# Patient Record
Sex: MALE | Race: WHITE | NOT HISPANIC OR LATINO | Employment: FULL TIME | URBAN - NONMETROPOLITAN AREA
[De-identification: names, ages, dates, MRNs, and addresses within clinical notes are randomized per-mention and may not be internally consistent; named-entity substitution may affect disease eponyms.]

---

## 2019-11-16 ENCOUNTER — HOSPITAL ENCOUNTER (EMERGENCY)
Facility: HOSPITAL | Age: 59
Discharge: HOME | End: 2019-11-16
Attending: EMERGENCY MEDICINE | Admitting: EMERGENCY MEDICINE

## 2019-11-16 ENCOUNTER — APPOINTMENT (OUTPATIENT)
Dept: GENERAL RADIOLOGY | Facility: HOSPITAL | Age: 59
End: 2019-11-16

## 2019-11-16 VITALS
BODY MASS INDEX: 26.63 KG/M2 | HEART RATE: 67 BPM | HEIGHT: 70 IN | TEMPERATURE: 98.2 F | WEIGHT: 186 LBS | DIASTOLIC BLOOD PRESSURE: 82 MMHG | RESPIRATION RATE: 18 BRPM | OXYGEN SATURATION: 96 % | SYSTOLIC BLOOD PRESSURE: 144 MMHG
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
## 2019-11-16 PROCEDURE — 73130 X-RAY EXAM OF HAND: CPT

## 2019-11-16 PROCEDURE — 99283 EMERGENCY DEPT VISIT LOW MDM: CPT

## 2019-11-16 RX ORDER — CEPHALEXIN 500 MG/1
500 CAPSULE ORAL 3 TIMES DAILY
Qty: 15 CAPSULE | Refills: 0 | Status: SHIPPED | OUTPATIENT
Start: 2019-11-16 | End: 2019-11-21

## 2019-11-16 RX ORDER — LIDOCAINE HYDROCHLORIDE 10 MG/ML
10 INJECTION, SOLUTION EPIDURAL; INFILTRATION; INTRACAUDAL; PERINEURAL ONCE
Status: DISCONTINUED | OUTPATIENT
Start: 2019-11-16 | End: 2019-11-16 | Stop reason: HOSPADM

## 2019-11-16 RX ORDER — BACITRACIN, NEOMYCIN, POLYMYXIN B 400; 3.5; 5 [USP'U]/G; MG/G; [USP'U]/G
OINTMENT TOPICAL ONCE
Status: DISCONTINUED | OUTPATIENT
Start: 2019-11-16 | End: 2019-11-16 | Stop reason: HOSPADM

## 2019-11-16 RX ADMIN — LIDOCAINE HYDROCHLORIDE 10 ML: 10 INJECTION, SOLUTION EPIDURAL; INFILTRATION; INTRACAUDAL; PERINEURAL at 18:10

## 2019-11-16 NOTE — DISCHARGE INSTRUCTIONS
Call one of the offices below to establish a primary care provider.  If you are unable to get an appointment and feel it is an emergency and need to be seen immediately please return to the Emergency Department.    Call one of the office below to set up a primary care provider.    Dr. Randell Peacock                                                                                                       [address] [facility] [address]  [phone]    Dr. Mcmillan, Dr. FRANCIS Green, Dr. JAIRO Green ([facility])  [address] [address]  [phone]    Dr. Freitas, Dr. Ly, Dr. Franco ([facility])  [address] [facility] [address]  [phone]    Dr. Ortiz  [address] [facility] [address]  [phone]    Dr. Hodges, Dr. Stevens, Dr. Freeman, Dr. Lindo ([facility])  [address] [place] [address] [address]  [facility] [address]  [phone]    Dr. Lila Marshall  [address] [facility] [place] [address]  [phone]    Dr. Emma Borges  [address]   [address]  [facility] [address]  [phone]    Dr. Peacock  [address] [facility] [address]  [phone]    Dr. Graves, Dr. Snow  [address] [facility] [place] [address]  [phone]    Dr. Barron  [address]                                                              [address]  [facility] [address]  [phone]    Dr. Blakely  [address] [facility] [id number] [phone]    Dr. Vita Pedroza  [address]  [address]  [facility] [address]  [phone]

## 2019-11-16 NOTE — ED PROVIDER NOTES
Subjective     History provided by:  Patient   used: No    Hand Injury   Location:  Finger  Finger location:  L index finger  Injury: yes    Time since incident:  1 hour  Mechanism of injury comment:  Patient reports that he was fishing and got one treble hook embeeded into skin of left index finger  Pain details:     Quality:  Aching    Radiates to:  Does not radiate    Severity:  Mild    Onset quality:  Sudden    Duration:  1 hour    Timing:  Constant    Progression:  Waxing and waning  Handedness:  Right-handed  Dislocation: no    Foreign body present:  Metal (fish hook )  Tetanus status:  Up to date  Prior injury to area:  No  Relieved by:  Nothing  Worsened by:  Nothing  Ineffective treatments:  None tried  Associated symptoms: no back pain, no decreased range of motion, no fever, no neck pain, no numbness, no swelling and no tingling    Risk factors: no frequent fractures and no recent illness        Review of Systems   Constitutional: Negative.  Negative for fever.   HENT: Negative.    Eyes: Negative.    Respiratory: Negative.    Cardiovascular: Negative.    Gastrointestinal: Negative.    Endocrine: Negative.    Genitourinary: Negative.    Musculoskeletal: Negative.  Negative for back pain and neck pain.   Skin: Negative.    Allergic/Immunologic: Negative.    Neurological: Negative.    Hematological: Negative.    Psychiatric/Behavioral: Negative.        No past medical history on file.    No Known Allergies    Past Surgical History:   Procedure Laterality Date   • ANKLE ARTHROSCOPY         No family history on file.    Social History     Socioeconomic History   • Marital status: Single     Spouse name: Not on file   • Number of children: Not on file   • Years of education: Not on file   • Highest education level: Not on file   Tobacco Use   • Smoking status: Never Smoker   Substance and Sexual Activity   • Alcohol use: Yes   • Drug use: No   • Sexual activity: Defer           Objective    Physical Exam   Constitutional: He is oriented to person, place, and time. He appears well-developed and well-nourished.   HENT:   Head: Normocephalic.   Right Ear: External ear normal.   Left Ear: External ear normal.   Mouth/Throat: Oropharynx is clear and moist.   Eyes: Conjunctivae and EOM are normal. Pupils are equal, round, and reactive to light.   Neck: Normal range of motion. Neck supple.   Cardiovascular: Normal rate, regular rhythm, normal heart sounds and intact distal pulses.   Pulmonary/Chest: Effort normal and breath sounds normal.   Abdominal: Soft. Bowel sounds are normal.   Musculoskeletal: Normal range of motion.   Neurological: He is alert and oriented to person, place, and time.   Skin: Skin is warm and dry. Capillary refill takes less than 2 seconds.   Fish hook embedded superficially into surface of middle phalanx of left finger on dorsum of hand     Psychiatric: He has a normal mood and affect. His behavior is normal. Thought content normal.   Nursing note and vitals reviewed.      Foreign Body Removal - Embedded  Date/Time: 11/16/2019 6:15 PM  Performed by: Fawad Avila APRN  Authorized by: Peter Peguero MD     Consent:     Consent obtained:  Verbal    Consent given by:  Patient    Risks discussed:  Bleeding, infection, pain, incomplete removal and poor cosmetic result  Location:     Location:  Finger    Finger location:  L index finger    Depth:  Intradermal    Tendon involvement:  None  Pre-procedure details:     Imaging:  X-ray    Neurovascular status: intact    Anesthesia (see MAR for exact dosages):     Anesthesia method:  Nerve block    Block needle gauge:  25 G    Block anesthetic:  Lidocaine 1% w/o epi    Block technique:  Digital block    Block injection procedure:  Anatomic landmarks identified, introduced needle, incremental injection, anatomic landmarks palpated and negative aspiration for blood    Block outcome:  Anesthesia achieved  Procedure type:     Procedure  complexity:  Simple  Procedure details:     Localization method:  Visualized    Dissection of underlying tissues: no      Bloodless field: yes      Removal mechanism: pushed jose m of hook through and cut and then removed from finger.    Foreign bodies recovered:  1    Description:  Fish hook     Intact foreign body removal: yes    Post-procedure details:     Neurovascular status: intact      Confirmation:  No additional foreign bodies on visualization    Skin closure:  None    Dressing:  Antibiotic ointment and sterile dressing    Patient tolerance of procedure:  Tolerated well, no immediate complications               ED Course                  MDM    Final diagnoses:   Foreign body entering through skin, initial encounter              Fawad Avila, APRN  11/16/19 1828

## 2020-06-05 ENCOUNTER — APPOINTMENT (OUTPATIENT)
Dept: GENERAL RADIOLOGY | Facility: HOSPITAL | Age: 60
End: 2020-06-05

## 2020-06-05 ENCOUNTER — HOSPITAL ENCOUNTER (INPATIENT)
Facility: HOSPITAL | Age: 60
LOS: 2 days | Discharge: HOME | End: 2020-06-07
Attending: EMERGENCY MEDICINE | Admitting: INTERNAL MEDICINE

## 2020-06-05 ENCOUNTER — APPOINTMENT (OUTPATIENT)
Dept: CT IMAGING | Facility: HOSPITAL | Age: 60
End: 2020-06-05

## 2020-06-05 DIAGNOSIS — L03.115 CELLULITIS OF FOOT, RIGHT: Primary | ICD-10-CM

## 2020-06-05 DIAGNOSIS — E11.621 CONTROLLED TYPE 2 DIABETES MELLITUS WITH FOOT ULCER, WITHOUT LONG-TERM CURRENT USE OF INSULIN (HCC): ICD-10-CM

## 2020-06-05 DIAGNOSIS — E11.621 TYPE 2 DIABETES MELLITUS WITH FOOT ULCER, WITHOUT LONG-TERM CURRENT USE OF INSULIN (HCC): ICD-10-CM

## 2020-06-05 DIAGNOSIS — R73.9 HYPERGLYCEMIA: ICD-10-CM

## 2020-06-05 DIAGNOSIS — L97.509 CONTROLLED TYPE 2 DIABETES MELLITUS WITH FOOT ULCER, WITHOUT LONG-TERM CURRENT USE OF INSULIN (HCC): ICD-10-CM

## 2020-06-05 DIAGNOSIS — L97.509 TYPE 2 DIABETES MELLITUS WITH FOOT ULCER, WITHOUT LONG-TERM CURRENT USE OF INSULIN (HCC): ICD-10-CM

## 2020-06-05 PROBLEM — L03.90 SEPSIS DUE TO CELLULITIS (HCC): Status: ACTIVE | Noted: 2020-06-05

## 2020-06-05 PROBLEM — A41.9 SEPSIS DUE TO CELLULITIS (HCC): Status: ACTIVE | Noted: 2020-06-05

## 2020-06-05 LAB
ALBUMIN SERPL-MCNC: 3.9 G/DL (ref 3.5–5.2)
ALBUMIN/GLOB SERPL: 0.8 G/DL
ALP SERPL-CCNC: 114 U/L (ref 39–117)
ALT SERPL W P-5'-P-CCNC: 11 U/L (ref 1–41)
ANION GAP SERPL CALCULATED.3IONS-SCNC: 11.4 MMOL/L (ref 5–15)
AST SERPL-CCNC: 14 U/L (ref 1–40)
BASOPHILS # BLD AUTO: 0.04 10*3/MM3 (ref 0–0.2)
BASOPHILS NFR BLD AUTO: 0.3 % (ref 0–1.5)
BILIRUB SERPL-MCNC: 0.3 MG/DL (ref 0.2–1.2)
BILIRUB UR QL STRIP: NEGATIVE
BUN BLD-MCNC: 19 MG/DL (ref 8–23)
BUN/CREAT SERPL: 20.2 (ref 7–25)
CALCIUM SPEC-SCNC: 9.7 MG/DL (ref 8.6–10.5)
CHLORIDE SERPL-SCNC: 98 MMOL/L (ref 98–107)
CLARITY UR: CLEAR
CO2 SERPL-SCNC: 26.6 MMOL/L (ref 22–29)
COLOR UR: YELLOW
CREAT BLD-MCNC: 0.94 MG/DL (ref 0.76–1.27)
CRP SERPL-MCNC: 8.32 MG/DL (ref 0–0.5)
D-LACTATE SERPL-SCNC: 1.7 MMOL/L (ref 0.5–2)
DEPRECATED RDW RBC AUTO: 39.7 FL (ref 37–54)
EOSINOPHIL # BLD AUTO: 0.1 10*3/MM3 (ref 0–0.4)
EOSINOPHIL NFR BLD AUTO: 0.7 % (ref 0.3–6.2)
ERYTHROCYTE [DISTWIDTH] IN BLOOD BY AUTOMATED COUNT: 11.9 % (ref 12.3–15.4)
ERYTHROCYTE [SEDIMENTATION RATE] IN BLOOD: 47 MM/HR (ref 0–20)
GFR SERPL CREATININE-BSD FRML MDRD: 82 ML/MIN/1.73
GLOBULIN UR ELPH-MCNC: 4.7 GM/DL
GLUCOSE BLD-MCNC: 344 MG/DL (ref 65–99)
GLUCOSE BLDC GLUCOMTR-MCNC: 239 MG/DL (ref 70–130)
GLUCOSE UR STRIP-MCNC: ABNORMAL MG/DL
HBA1C MFR BLD: 9.8 % (ref 4.8–5.6)
HCT VFR BLD AUTO: 42.1 % (ref 37.5–51)
HGB BLD-MCNC: 13.9 G/DL (ref 13–17.7)
HGB UR QL STRIP.AUTO: NEGATIVE
HOLD SPECIMEN: NORMAL
HOLD SPECIMEN: NORMAL
IMM GRANULOCYTES # BLD AUTO: 0.04 10*3/MM3 (ref 0–0.05)
IMM GRANULOCYTES NFR BLD AUTO: 0.3 % (ref 0–0.5)
KETONES UR QL STRIP: ABNORMAL
LEUKOCYTE ESTERASE UR QL STRIP.AUTO: NEGATIVE
LYMPHOCYTES # BLD AUTO: 0.97 10*3/MM3 (ref 0.7–3.1)
LYMPHOCYTES NFR BLD AUTO: 7.1 % (ref 19.6–45.3)
MCH RBC QN AUTO: 30 PG (ref 26.6–33)
MCHC RBC AUTO-ENTMCNC: 33 G/DL (ref 31.5–35.7)
MCV RBC AUTO: 90.7 FL (ref 79–97)
MONOCYTES # BLD AUTO: 1.04 10*3/MM3 (ref 0.1–0.9)
MONOCYTES NFR BLD AUTO: 7.6 % (ref 5–12)
NEUTROPHILS # BLD AUTO: 11.5 10*3/MM3 (ref 1.7–7)
NEUTROPHILS NFR BLD AUTO: 84 % (ref 42.7–76)
NITRITE UR QL STRIP: NEGATIVE
NRBC BLD AUTO-RTO: 0 /100 WBC (ref 0–0.2)
PH UR STRIP.AUTO: 5.5 [PH] (ref 5–8)
PLATELET # BLD AUTO: 461 10*3/MM3 (ref 140–450)
PMV BLD AUTO: 9.1 FL (ref 6–12)
POTASSIUM BLD-SCNC: 4.4 MMOL/L (ref 3.5–5.2)
PROT SERPL-MCNC: 8.6 G/DL (ref 6–8.5)
PROT UR QL STRIP: NEGATIVE
RBC # BLD AUTO: 4.64 10*6/MM3 (ref 4.14–5.8)
SODIUM BLD-SCNC: 136 MMOL/L (ref 136–145)
SP GR UR STRIP: >1.03 (ref 1–1.03)
UROBILINOGEN UR QL STRIP: ABNORMAL
WBC NRBC COR # BLD: 13.69 10*3/MM3 (ref 3.4–10.8)
WHOLE BLOOD HOLD SPECIMEN: NORMAL
WHOLE BLOOD HOLD SPECIMEN: NORMAL

## 2020-06-05 PROCEDURE — 85652 RBC SED RATE AUTOMATED: CPT | Performed by: PHYSICIAN ASSISTANT

## 2020-06-05 PROCEDURE — 73630 X-RAY EXAM OF FOOT: CPT | Performed by: RADIOLOGY

## 2020-06-05 PROCEDURE — 25010000002 VANCOMYCIN 5 G RECONSTITUTED SOLUTION: Performed by: INTERNAL MEDICINE

## 2020-06-05 PROCEDURE — 81003 URINALYSIS AUTO W/O SCOPE: CPT | Performed by: PHYSICIAN ASSISTANT

## 2020-06-05 PROCEDURE — 85025 COMPLETE CBC W/AUTO DIFF WBC: CPT | Performed by: PHYSICIAN ASSISTANT

## 2020-06-05 PROCEDURE — 82962 GLUCOSE BLOOD TEST: CPT

## 2020-06-05 PROCEDURE — 25010000002 HEPARIN (PORCINE) PER 1000 UNITS: Performed by: INTERNAL MEDICINE

## 2020-06-05 PROCEDURE — 87070 CULTURE OTHR SPECIMN AEROBIC: CPT | Performed by: PHYSICIAN ASSISTANT

## 2020-06-05 PROCEDURE — 73630 X-RAY EXAM OF FOOT: CPT

## 2020-06-05 PROCEDURE — 83605 ASSAY OF LACTIC ACID: CPT | Performed by: PHYSICIAN ASSISTANT

## 2020-06-05 PROCEDURE — 83036 HEMOGLOBIN GLYCOSYLATED A1C: CPT | Performed by: PHYSICIAN ASSISTANT

## 2020-06-05 PROCEDURE — 87147 CULTURE TYPE IMMUNOLOGIC: CPT | Performed by: PHYSICIAN ASSISTANT

## 2020-06-05 PROCEDURE — 99284 EMERGENCY DEPT VISIT MOD MDM: CPT

## 2020-06-05 PROCEDURE — 73700 CT LOWER EXTREMITY W/O DYE: CPT | Performed by: RADIOLOGY

## 2020-06-05 PROCEDURE — 25010000002 PIPERACILLIN SOD-TAZOBACTAM PER 1 G: Performed by: PHYSICIAN ASSISTANT

## 2020-06-05 PROCEDURE — 99223 1ST HOSP IP/OBS HIGH 75: CPT | Performed by: INTERNAL MEDICINE

## 2020-06-05 PROCEDURE — 80053 COMPREHEN METABOLIC PANEL: CPT | Performed by: PHYSICIAN ASSISTANT

## 2020-06-05 PROCEDURE — 73700 CT LOWER EXTREMITY W/O DYE: CPT

## 2020-06-05 PROCEDURE — 86140 C-REACTIVE PROTEIN: CPT | Performed by: PHYSICIAN ASSISTANT

## 2020-06-05 PROCEDURE — 87205 SMEAR GRAM STAIN: CPT | Performed by: PHYSICIAN ASSISTANT

## 2020-06-05 PROCEDURE — 36415 COLL VENOUS BLD VENIPUNCTURE: CPT

## 2020-06-05 PROCEDURE — 87040 BLOOD CULTURE FOR BACTERIA: CPT | Performed by: PHYSICIAN ASSISTANT

## 2020-06-05 RX ORDER — SODIUM CHLORIDE 0.9 % (FLUSH) 0.9 %
10 SYRINGE (ML) INJECTION EVERY 12 HOURS SCHEDULED
Status: DISCONTINUED | OUTPATIENT
Start: 2020-06-05 | End: 2020-06-07 | Stop reason: HOSPADM

## 2020-06-05 RX ORDER — ACETAMINOPHEN 325 MG/1
650 TABLET ORAL EVERY 6 HOURS PRN
Status: DISCONTINUED | OUTPATIENT
Start: 2020-06-05 | End: 2020-06-07 | Stop reason: HOSPADM

## 2020-06-05 RX ORDER — HEPARIN SODIUM 5000 [USP'U]/ML
5000 INJECTION, SOLUTION INTRAVENOUS; SUBCUTANEOUS EVERY 12 HOURS SCHEDULED
Status: DISCONTINUED | OUTPATIENT
Start: 2020-06-05 | End: 2020-06-07 | Stop reason: HOSPADM

## 2020-06-05 RX ORDER — SODIUM CHLORIDE 0.9 % (FLUSH) 0.9 %
10 SYRINGE (ML) INJECTION AS NEEDED
Status: DISCONTINUED | OUTPATIENT
Start: 2020-06-05 | End: 2020-06-07 | Stop reason: HOSPADM

## 2020-06-05 RX ORDER — DEXTROSE MONOHYDRATE 25 G/50ML
25 INJECTION, SOLUTION INTRAVENOUS
Status: DISCONTINUED | OUTPATIENT
Start: 2020-06-05 | End: 2020-06-07 | Stop reason: HOSPADM

## 2020-06-05 RX ORDER — HYDROCODONE BITARTRATE AND ACETAMINOPHEN 5; 325 MG/1; MG/1
1 TABLET ORAL EVERY 6 HOURS PRN
Status: DISCONTINUED | OUTPATIENT
Start: 2020-06-05 | End: 2020-06-07 | Stop reason: HOSPADM

## 2020-06-05 RX ORDER — NICOTINE POLACRILEX 4 MG
15 LOZENGE BUCCAL
Status: DISCONTINUED | OUTPATIENT
Start: 2020-06-05 | End: 2020-06-07 | Stop reason: HOSPADM

## 2020-06-05 RX ADMIN — PIPERACILLIN SODIUM AND TAZOBACTAM SODIUM 3.38 G: 3; .375 INJECTION, POWDER, LYOPHILIZED, FOR SOLUTION INTRAVENOUS at 17:51

## 2020-06-05 RX ADMIN — VANCOMYCIN HYDROCHLORIDE 1750 MG: 5 INJECTION, POWDER, LYOPHILIZED, FOR SOLUTION INTRAVENOUS at 20:05

## 2020-06-05 RX ADMIN — HEPARIN SODIUM 5000 UNITS: 5000 INJECTION INTRAVENOUS; SUBCUTANEOUS at 20:58

## 2020-06-05 RX ADMIN — SODIUM CHLORIDE, PRESERVATIVE FREE 10 ML: 5 INJECTION INTRAVENOUS at 20:55

## 2020-06-05 NOTE — H&P
"Hospitalist History and Physical    Patient Identification:  Name: Davian Newell  Age/Sex: 60 y.o. male  :  1960  MRN: [id number]         Primary Care Physician: Provider, No Known    Chief Complaint   Patient presents with   • Foot Pain       History of Present Illness  Patient is a 60 y.o. male presents with the following: Right foot pain and redness, wound    Patient is a 60-year-old male with no significant past medical history who presents to the emergency department with a 2-week history of progressive right foot pain with erythema.    Patient states that he has had several calluses in the past as he has had surgery with steel plates in the right foot.  Patient states that he walks \"on my tiptoes.\"  He states that due to the way he walks he suffers from recurrent calluses.  The patient reports that he removed a callus from the neural aspect of the right foot and states that he \"pulled off some good skin by accident.\"  The patient reports a 3 to 4-day history of progressive redness.  He reports that the wound has been open for the past 2 weeks and he has been filling it with over-the-counter antibiotic ointment with a Band-Aid on top.  He denies any significant drainage.  He denies any injury.  He denies any fevers, chills, nausea and/or vomiting.    Questioning, patient denied any recent chest pains, shortness of air, chest pains, diarrhea and/or urinary symptoms.    When questioned regarding his elevated blood glucose level, patient denies any known history of diabetes and states that he has drank 2 energy drinks earlier today.    Patient was seen by Dr. Gerard today in her office who recommended he present to the emergency department as concerned that he may have osteomyelitis.  The patient states that his sister gave him 1 ciprofloxacin tablet but apart from this and the antibiotic ointment he has had no other therapy.    In the emergency department, patient's maximum temperature was 99.5, heart rate " 95, blood pressure 144/84.  C-RP 8.32, CMP was remarkable for glucose of 344.  Blood cell count 13.69 with 84% neutrophils.  CT scan of the right lower extremity without contrast did not reveal any osteomyelitis or abscess.    Patient is seen and examined in ER 2, room 8 and is to be admitted to the medical surgical floor for further evaluation and management.    Past History:  No past medical history on file.  Past Surgical History:   Procedure Laterality Date   • ANKLE ARTHROSCOPY       No family history on file.  Social History     Tobacco Use   • Smoking status: Never Smoker   Substance Use Topics   • Alcohol use: Yes   • Drug use: No       (Not in a hospital admission)  Allergies: Patient has no known allergies.    Review of Systems:  Review of Systems   Constitutional: Negative for chills, diaphoresis and fever.   HENT: Negative for hearing loss, tinnitus and trouble swallowing.    Eyes: Negative for discharge and visual disturbance.   Respiratory: Negative for cough, shortness of breath and wheezing.    Cardiovascular: Negative for chest pain, palpitations and leg swelling.   Gastrointestinal: Negative for abdominal pain, constipation, diarrhea, nausea and vomiting.   Endocrine: Negative for polydipsia, polyphagia and polyuria.   Genitourinary: Negative for dysuria, frequency and hematuria.   Musculoskeletal: Positive for gait problem. Negative for myalgias and neck pain.   Skin: Positive for color change and rash.   Neurological: Negative for dizziness, tremors, seizures, syncope, weakness and light-headedness.   Hematological: Does not bruise/bleed easily.   Psychiatric/Behavioral: Negative for confusion, hallucinations and suicidal ideas.      Vital Signs  Temp:  [98.1 °F (36.7 °C)] 98.1 °F (36.7 °C)  Heart Rate:  [73-95] 73  Resp:  [16] 16  BP: (144-148)/(81-82) 144/82  Body mass index is 25.83 kg/m².    Physical Exam:  Physical Exam   Constitutional: He is oriented to person, place, and time. He appears  well-developed and well-nourished. No distress.   HENT:   Head: Normocephalic and atraumatic.   Mouth/Throat: Oropharynx is clear and moist.   Eyes: Pupils are equal, round, and reactive to light. Conjunctivae and EOM are normal.   Neck: Neck supple. No tracheal deviation present. No thyromegaly present.   Cardiovascular: Normal rate and regular rhythm. Exam reveals no gallop and no friction rub.   No murmur heard.  Pulmonary/Chest: Breath sounds normal. No respiratory distress. He has no wheezes. He has no rales.   Abdominal: Soft. Bowel sounds are normal. He exhibits no distension. There is no tenderness. There is no guarding.   Musculoskeletal: Normal range of motion. He exhibits no tenderness.   Neurological: He is alert and oriented to person, place, and time. No cranial nerve deficit.   Skin: Skin is warm and dry. No rash noted. There is erythema.   Erythema noted on right foot from 2nd toe laterally around almost the entire foot. He has an open, rounded wound on the lateral aspect of the right foot. The dressing is mildly bloody. Wound bed is with slough.   Psychiatric: He has a normal mood and affect.      Results Review:    Results from last 7 days   Lab Units 06/05/20  1627   WBC 10*3/mm3 13.69*   HEMOGLOBIN g/dL 13.9   HEMATOCRIT % 42.1   PLATELETS 10*3/mm3 461*     Results from last 7 days   Lab Units 06/05/20  1627   SODIUM mmol/L 136   POTASSIUM mmol/L 4.4   CHLORIDE mmol/L 98   CO2 mmol/L 26.6   BUN mg/dL 19   CREATININE mg/dL 0.94   CALCIUM mg/dL 9.7   GLUCOSE mg/dL 344*     Results from last 7 days   Lab Units 06/05/20  1627   BILIRUBIN mg/dL 0.3   ALK PHOS U/L 114   AST (SGOT) U/L 14   ALT (SGPT) U/L 11     Results from last 7 days   Lab Units 06/05/20  1627   CRP mg/dL 8.32*     Imaging:    Xray images reviewed; no obvious osteomyelitis.    Imaging Results (Most Recent)     Procedure Component Value Units Date/Time    CT Lower Extremity Right Without Contrast [[id number]] Collected:  06/05/20 4107      Updated:  06/05/20 1721    Narrative:       EXAMINATION: CT LOWER EXTREMITY RIGHT WO CONTRAST-      CLINICAL INDICATION: swelling, tunneling wound, pain        COMPARISON: None available           DOSE:     Radiation dose reduction techniques were utilized per ALARA protocol.  Automated exposure control was initiated through either or [employer] or  DoseRight software packages by  protocol.              TECHNIQUE: Axial images were acquired through [employer]on[employer] ankle and foot  without IV contrast. Reformatted images were created.     FINDINGS:  Today's study shows postoperative hardware extending through the distal  tibia and talus.     I do not see drainable fluid collection. There is some subcutaneous  edema noted.     No evidence of osteomyelitis on today's study.       Impression:       Postoperative change and arthritic change but no evidence of  osteomyelitis or drainable fluid collection on the presented exam     This report was finalized on 6/5/2020 5:19 PM by Dr. Hussein Auguste MD.       XR Foot 3+ View Right [[id number]] Collected:  06/05/20 1649     Updated:  06/05/20 1654    Narrative:       EXAMINATION: XR FOOT 3+ VW RIGHT-      CLINICAL INDICATION: foot swelling pain        COMPARISON: None available     FINDINGS: 3 views of the right foot show appearance equivocal for  fracture involving the distal aspect of the fifth metatarsal. I do  recommend clinical correlation for point tenderness.     No focal erosive changes are identified to suggest osteomyelitis.      This report was finalized on 6/5/2020 4:51 PM by Dr. Hussein Auguste MD.             Assessment/Plan     -Sepsis, present upon admission with leukocytosis, heart rate greater than 90 and elevated C-RP secondary to a right foot cellulitis with open wound and hardware for the distal tibia and talus: He will be admitted to the medical surgical floor with telemetry.  He has been started on pharmacy to dose vancomycin and Zosyn.  Blood cultures  and a wound culture were obtained in the emergency department.  Podiatry has been consulted and the patient will be treated by mouth after midnight in case debridement is indicated.  Patient will have as needed Norco for pain.  I will repeat his CBC and C-RP in a.m.  Need to follow culture results and tailor antibiotics accordingly.    -Hyperglycemia without known diabetes mellitus: Patient's blood glucose levels significantly elevated at 344.  Hemoglobin A1c has been ordered.  Patient will be started on Accu-Cheks and the low-dose as needed sliding scale insulin.    -Active thrombocytosis secondary to #1: Repeat CBC in a.m.    -Blood pressure without known history of hypertension: We will monitor.  Pain likely exacerbating.  Hold on antihypertensive agent at this time.    DVT prophylaxis: Subcutaneous heparin    Estimated Length of Stay: > 2 MNs    I discussed the patients findings and my recommendations with patient      Carina Grossman DO  06/05/20  18:02

## 2020-06-05 NOTE — ED PROVIDER NOTES
Subjective   61 y/o male patient presents to the ED today with complaints of right foot pain. Patient was referred to the ED by Dr. Daisy Gerard with podiatry. Patient states that a couple weeks ago he pulled a callous off his foot and thinks he may have taken some good skin with it. Pt states since then his foot has gotten red, warm, swollen and the area where the callous was will not heal. He seen Dr. Gerard today. She recommended him come to the ED for antibiotics.  Sister gave him 1 cipro but has not been on any other antibiotics.  No fevers at home.       History provided by:  Patient   used: No        Review of Systems   Constitutional: Negative.    HENT: Negative.    Eyes: Negative.    Respiratory: Negative.    Cardiovascular: Negative.    Gastrointestinal: Negative.    Endocrine: Negative.    Genitourinary: Negative.    Musculoskeletal:        Right foot pain     Skin: Positive for wound.   Allergic/Immunologic: Negative.    Neurological: Negative.    Hematological: Negative.    Psychiatric/Behavioral: Negative.    All other systems reviewed and are negative.      History reviewed. No pertinent past medical history.    No Known Allergies    Past Surgical History:   Procedure Laterality Date   • ANKLE ARTHROSCOPY         History reviewed. No pertinent family history.    Social History     Socioeconomic History   • Marital status: Single     Spouse name: Not on file   • Number of children: Not on file   • Years of education: Not on file   • Highest education level: Not on file   Tobacco Use   • Smoking status: Never Smoker   Substance and Sexual Activity   • Alcohol use: Yes   • Drug use: No   • Sexual activity: Defer           Objective   Physical Exam   Constitutional: He is oriented to person, place, and time. He appears well-developed and well-nourished.   HENT:   Head: Normocephalic and atraumatic.   Right Ear: External ear normal.   Left Ear: External ear normal.   Nose: Nose normal.      Mouth/Throat: Oropharynx is clear and moist.   Eyes: Pupils are equal, round, and reactive to light. Conjunctivae and EOM are normal.   Neck: Normal range of motion. Neck supple.   Cardiovascular: Normal rate, regular rhythm, normal heart sounds and intact distal pulses.   Pulmonary/Chest: Effort normal and breath sounds normal.   Abdominal: Soft. Bowel sounds are normal.   Musculoskeletal: Normal range of motion.        Neurological: He is alert and oriented to person, place, and time.   Skin: Skin is warm and dry. Capillary refill takes less than 2 seconds.   Nursing note and vitals reviewed.      Procedures           ED Course  ED Course as of Jun 05 1905 Fri Jun 05, 2020   1657 Narrative & Impression    EXAMINATION: XR FOOT 3+ VW RIGHT-      CLINICAL INDICATION: foot swelling pain        COMPARISON: None available     FINDINGS: 3 views of the right foot show appearance equivocal for  fracture involving the distal aspect of the fifth metatarsal. I do  recommend clinical correlation for point tenderness.     No focal erosive changes are identified to suggest osteomyelitis.      This report was finalized on 6/5/2020 4:51 PM by Dr. Hussein Auguste MD.       XR Foot 3+ View Right [ML]   7298 I spoke with Dr. Daisy Gerard on the phone. She stated she did an xray in office and seen spot on the bone worrisome of osteomyelitis and would recommend CT scan and admission to the hospitalist service.     [ML]   1728    IMPRESSION:  Postoperative change and arthritic change but no evidence of  osteomyelitis or drainable fluid collection on the presented exam     This report was finalized on 6/5/2020 5:19 PM by Dr. Hussein Auguste MD.   CT Lower Extremity Right Without Contrast [ML]   1734 Paged hospitalist     [ML]   1740 Dr. Grossman will accept the patient.     [ML]   1813 I personally saw and evaluated this patient and agree with Karen's plan and documentation.  Patient is currently resting comfortably with no complaints.     [EG]      ED Course User Index  [EG] Rosemary Flores DO  [ML] Karen Silverman PA                                           MDM  Number of Diagnoses or Management Options  Cellulitis of foot, right:   Hyperglycemia:      Amount and/or Complexity of Data Reviewed  Clinical lab tests: ordered and reviewed  Tests in the radiology section of CPT®: ordered and reviewed  Discuss the patient with other providers: yes    Risk of Complications, Morbidity, and/or Mortality  Presenting problems: moderate  Diagnostic procedures: moderate  Management options: moderate    Patient Progress  Patient progress: stable      Final diagnoses:   Cellulitis of foot, right   Hyperglycemia            Karen Silverman PA  06/05/20 1742       Karen Silverman PA  06/05/20 9318

## 2020-06-06 LAB
ALBUMIN SERPL-MCNC: 2.85 G/DL (ref 3.5–5.2)
ALBUMIN/GLOB SERPL: 0.7 G/DL
ALP SERPL-CCNC: 87 U/L (ref 39–117)
ALT SERPL W P-5'-P-CCNC: 8 U/L (ref 1–41)
ANION GAP SERPL CALCULATED.3IONS-SCNC: 7.9 MMOL/L (ref 5–15)
AST SERPL-CCNC: 10 U/L (ref 1–40)
BASOPHILS # BLD AUTO: 0.03 10*3/MM3 (ref 0–0.2)
BASOPHILS NFR BLD AUTO: 0.3 % (ref 0–1.5)
BILIRUB SERPL-MCNC: 0.3 MG/DL (ref 0.2–1.2)
BUN BLD-MCNC: 13 MG/DL (ref 8–23)
BUN/CREAT SERPL: 19.7 (ref 7–25)
CALCIUM SPEC-SCNC: 8.7 MG/DL (ref 8.6–10.5)
CHLORIDE SERPL-SCNC: 100 MMOL/L (ref 98–107)
CO2 SERPL-SCNC: 22.1 MMOL/L (ref 22–29)
CREAT BLD-MCNC: 0.66 MG/DL (ref 0.76–1.27)
CRP SERPL-MCNC: 6.95 MG/DL (ref 0–0.5)
DEPRECATED RDW RBC AUTO: 39.1 FL (ref 37–54)
EOSINOPHIL # BLD AUTO: 0.06 10*3/MM3 (ref 0–0.4)
EOSINOPHIL NFR BLD AUTO: 0.6 % (ref 0.3–6.2)
ERYTHROCYTE [DISTWIDTH] IN BLOOD BY AUTOMATED COUNT: 11.9 % (ref 12.3–15.4)
GFR SERPL CREATININE-BSD FRML MDRD: 123 ML/MIN/1.73
GLOBULIN UR ELPH-MCNC: 4.1 GM/DL
GLUCOSE BLD-MCNC: 261 MG/DL (ref 65–99)
GLUCOSE BLDC GLUCOMTR-MCNC: 192 MG/DL (ref 70–130)
GLUCOSE BLDC GLUCOMTR-MCNC: 199 MG/DL (ref 70–130)
GLUCOSE BLDC GLUCOMTR-MCNC: 218 MG/DL (ref 70–130)
GLUCOSE BLDC GLUCOMTR-MCNC: 237 MG/DL (ref 70–130)
GLUCOSE BLDC GLUCOMTR-MCNC: 281 MG/DL (ref 70–130)
HCT VFR BLD AUTO: 37.1 % (ref 37.5–51)
HGB BLD-MCNC: 12.4 G/DL (ref 13–17.7)
IMM GRANULOCYTES # BLD AUTO: 0.04 10*3/MM3 (ref 0–0.05)
IMM GRANULOCYTES NFR BLD AUTO: 0.4 % (ref 0–0.5)
LYMPHOCYTES # BLD AUTO: 1.25 10*3/MM3 (ref 0.7–3.1)
LYMPHOCYTES NFR BLD AUTO: 11.8 % (ref 19.6–45.3)
MCH RBC QN AUTO: 30.2 PG (ref 26.6–33)
MCHC RBC AUTO-ENTMCNC: 33.4 G/DL (ref 31.5–35.7)
MCV RBC AUTO: 90.3 FL (ref 79–97)
MONOCYTES # BLD AUTO: 0.92 10*3/MM3 (ref 0.1–0.9)
MONOCYTES NFR BLD AUTO: 8.7 % (ref 5–12)
NEUTROPHILS # BLD AUTO: 8.3 10*3/MM3 (ref 1.7–7)
NEUTROPHILS NFR BLD AUTO: 78.2 % (ref 42.7–76)
NRBC BLD AUTO-RTO: 0 /100 WBC (ref 0–0.2)
PLATELET # BLD AUTO: 387 10*3/MM3 (ref 140–450)
PMV BLD AUTO: 9 FL (ref 6–12)
POTASSIUM BLD-SCNC: 4.2 MMOL/L (ref 3.5–5.2)
PROT SERPL-MCNC: 6.9 G/DL (ref 6–8.5)
RBC # BLD AUTO: 4.11 10*6/MM3 (ref 4.14–5.8)
SODIUM BLD-SCNC: 130 MMOL/L (ref 136–145)
WBC NRBC COR # BLD: 10.6 10*3/MM3 (ref 3.4–10.8)

## 2020-06-06 PROCEDURE — 25010000002 VANCOMYCIN: Performed by: INTERNAL MEDICINE

## 2020-06-06 PROCEDURE — 25010000002 CEFTRIAXONE PER 250 MG: Performed by: INTERNAL MEDICINE

## 2020-06-06 PROCEDURE — 85025 COMPLETE CBC W/AUTO DIFF WBC: CPT | Performed by: INTERNAL MEDICINE

## 2020-06-06 PROCEDURE — 94799 UNLISTED PULMONARY SVC/PX: CPT

## 2020-06-06 PROCEDURE — 99233 SBSQ HOSP IP/OBS HIGH 50: CPT | Performed by: INTERNAL MEDICINE

## 2020-06-06 PROCEDURE — 80053 COMPREHEN METABOLIC PANEL: CPT | Performed by: INTERNAL MEDICINE

## 2020-06-06 PROCEDURE — 25010000002 HEPARIN (PORCINE) PER 1000 UNITS: Performed by: INTERNAL MEDICINE

## 2020-06-06 PROCEDURE — 63710000001 INSULIN ASPART PER 5 UNITS: Performed by: INTERNAL MEDICINE

## 2020-06-06 PROCEDURE — 86140 C-REACTIVE PROTEIN: CPT | Performed by: INTERNAL MEDICINE

## 2020-06-06 PROCEDURE — 25010000002 PIPERACILLIN SOD-TAZOBACTAM PER 1 G: Performed by: INTERNAL MEDICINE

## 2020-06-06 PROCEDURE — 82962 GLUCOSE BLOOD TEST: CPT

## 2020-06-06 RX ADMIN — HEPARIN SODIUM 5000 UNITS: 5000 INJECTION INTRAVENOUS; SUBCUTANEOUS at 21:12

## 2020-06-06 RX ADMIN — SODIUM CHLORIDE, PRESERVATIVE FREE 10 ML: 5 INJECTION INTRAVENOUS at 08:11

## 2020-06-06 RX ADMIN — HYDROCODONE BITARTRATE AND ACETAMINOPHEN 1 TABLET: 5; 325 TABLET ORAL at 21:12

## 2020-06-06 RX ADMIN — VANCOMYCIN HYDROCHLORIDE 1250 MG: 10 INJECTION, POWDER, LYOPHILIZED, FOR SOLUTION INTRAVENOUS at 08:11

## 2020-06-06 RX ADMIN — INSULIN ASPART 4 UNITS: 100 INJECTION, SOLUTION INTRAVENOUS; SUBCUTANEOUS at 08:10

## 2020-06-06 RX ADMIN — PIPERACILLIN SODIUM AND TAZOBACTAM SODIUM 3.38 G: 3; .375 INJECTION, POWDER, LYOPHILIZED, FOR SOLUTION INTRAVENOUS at 00:19

## 2020-06-06 RX ADMIN — PIPERACILLIN SODIUM AND TAZOBACTAM SODIUM 3.38 G: 3; .375 INJECTION, POWDER, LYOPHILIZED, FOR SOLUTION INTRAVENOUS at 08:10

## 2020-06-06 RX ADMIN — HEPARIN SODIUM 5000 UNITS: 5000 INJECTION INTRAVENOUS; SUBCUTANEOUS at 08:11

## 2020-06-06 RX ADMIN — SODIUM CHLORIDE 2 G: 900 INJECTION INTRAVENOUS at 16:37

## 2020-06-06 RX ADMIN — INSULIN ASPART 2 UNITS: 100 INJECTION, SOLUTION INTRAVENOUS; SUBCUTANEOUS at 11:06

## 2020-06-06 RX ADMIN — HYDROCODONE BITARTRATE AND ACETAMINOPHEN 1 TABLET: 5; 325 TABLET ORAL at 00:19

## 2020-06-06 RX ADMIN — INSULIN ASPART 2 UNITS: 100 INJECTION, SOLUTION INTRAVENOUS; SUBCUTANEOUS at 16:37

## 2020-06-06 RX ADMIN — SODIUM CHLORIDE, PRESERVATIVE FREE 10 ML: 5 INJECTION INTRAVENOUS at 21:13

## 2020-06-06 RX ADMIN — VANCOMYCIN HYDROCHLORIDE 1250 MG: 10 INJECTION, POWDER, LYOPHILIZED, FOR SOLUTION INTRAVENOUS at 21:12

## 2020-06-06 RX ADMIN — METFORMIN HYDROCHLORIDE 500 MG: 500 TABLET ORAL at 17:56

## 2020-06-06 NOTE — PROGRESS NOTES
HOSPITALIST PROGRESS NOTE    Patient Identification:  Name:  Davian Newell  Age:  60 y.o.  Sex:  male  :  1960  MRN:  [id number]  Visit Number:  [id number]  Primary Care Provider:  Provider, No Known    Length of stay:  1     HPI: 60-year-old male being seen in follow-up for right foot wound and cellulitis, newly diagnosed diabetes    Subjective:  Patient seen earlier this afternoon he is resting in bed.  Patient has no complaints at this time.  He currently denies any pain in his foot.  He states that the Norco he received yesterday evening after arrival to the floor did improve his pain.  The patient has any shortness of air and/or chest pain.    Present during exam: POLO Burnett    Current Hospital Meds:    heparin (porcine) 5,000 Units Subcutaneous Q12H   insulin aspart 0-9 Units Subcutaneous TID AC   piperacillin-tazobactam 3.375 g Intravenous Q8H   sodium chloride 10 mL Intravenous Q12H   vancomycin 1,250 mg Intravenous Q12H     Vital Signs  Temp:  [98 °F (36.7 °C)-99.5 °F (37.5 °C)] 98 °F (36.7 °C)  Heart Rate:  [60-95] 64  Resp:  [16-18] 18  BP: (101-148)/(55-84) 105/64      20  1519   Weight: 81.6 kg (180 lb)     Body mass index is 25.83 kg/m².    Physical exam:  Physical Exam   Constitutional: He is oriented to person, place, and time. He appears well-developed and well-nourished. No distress.   HENT:   Head: Normocephalic and atraumatic.   Mouth/Throat: Oropharynx is clear and moist.   Eyes: Pupils are equal, round, and reactive to light. Conjunctivae and EOM are normal.   Neck: Neck supple. No tracheal deviation present.   Cardiovascular: Normal rate and regular rhythm. Exam reveals no gallop and no friction rub.   No murmur heard.  Pulmonary/Chest: Breath sounds normal. No respiratory distress. He has no wheezes. He has no rales.   Abdominal: Soft. Bowel sounds are normal. He exhibits no distension. There is no tenderness. There is no guarding.   Musculoskeletal: He exhibits no tenderness.    R foot has recently been dressed in kerlix   Neurological: He is alert and oriented to person, place, and time. No cranial nerve deficit.   Skin: Skin is warm and dry. No rash noted. No erythema.   Psychiatric: He has a normal mood and affect.     Results Review:  Results from last 7 days   Lab Units 06/06/20  0324 06/05/20  1627   WBC 10*3/mm3 10.60 13.69*   HEMOGLOBIN g/dL 12.4* 13.9   HEMATOCRIT % 37.1* 42.1   PLATELETS 10*3/mm3 387 461*     Results from last 7 days   Lab Units 06/06/20  0324 06/05/20  1627   SODIUM mmol/L 130* 136   POTASSIUM mmol/L 4.2 4.4   CHLORIDE mmol/L 100 98   CO2 mmol/L 22.1 26.6   BUN mg/dL 13 19   CREATININE mg/dL 0.66* 0.94   CALCIUM mg/dL 8.7 9.7   GLUCOSE mg/dL 261* 344*     Results from last 7 days   Lab Units 06/06/20  0324 06/05/20  1627   BILIRUBIN mg/dL 0.3 0.3   ALK PHOS U/L 87 114   AST (SGOT) U/L 10 14   ALT (SGPT) U/L 8 11     HgbA1c: Results for CAESAR ZAZUETA (MRN [id number]) as of 6/6/2020 14:58   Ref. Range 6/5/2020 16:27   Hemoglobin A1C Latest Ref Range: 4.80 - 5.60 % 9.80 (H)             Assessment/Plan     -Sepsis, present on admission and secondary to right foot cellulitis with open wound status post bedside irrigation per podiatry today: Based on preliminary culture results, I will de-escalate patient to IV Rocephin.  C-RP remains elevated but has improved to 6.95.  Repeat CBC and C-RP in a.m.    -Newly diagnosed diabetes mellitus type 2 that is uncontrolled with hemoglobin A1c 9.8%: Discussed with patient regarding oral anti-glycemic medications versus insulin.  Patient states that he would prefer to start with an oral agent and work on diet and exercise with a repeat hemoglobin A1c in the future per his primary care provider.  Plan to begin metformin this evening while hospitalized and continue with the as needed sliding scale insulin.    -Hyponatremia, pseudohyponatremia secondary to hyperglycemia with a corrected sodium level of 134 which is slightly low.   Repeat chemistry panel in a.m.    -Moderate hypoalbuminemia.    The patient is high risk due to the following diagnoses/reasons: Sepsis, newly diagnosed diabetes mellitus    I discussed the patients findings and my recommendations with patient and nursing staff.        Disposition  Home soon; likely 1-2 days      Carina Grossman DO  06/06/20  14:55

## 2020-06-06 NOTE — NURSING NOTE
Patient is under the care of Dr. Gerard for his right foot diabetic ulcer.  No issues for wound care nurse at this time.  
I will SWITCH the dose or number of times a day I take the medications listed below when I get home from the hospital:  None

## 2020-06-06 NOTE — PLAN OF CARE
Patient has wound on bottom of right foot thought he was removing a callous now has diabetic ulcer on foot new consults for diabetic education and nutrition consults done has complained of throbbing pain in foot wound consult also done

## 2020-06-06 NOTE — PROGRESS NOTES
Nutrition Services    Patient Name:  Davian Newell  YOB: 1960  MRN: [id number]  Admit Date:  6/5/2020    RD consulted for diet education.     Discussed glucose control by carbohydrate counting aiming for 180g daily. Encouraged the use of measuring cups at home and discussed appropriate portion sizes. Pt is familiar with diabetes as he has several friends that have diabetes. Discussed the importance of exercising and weight management. RD provided HO and pt verbalized understanding. Discussed the importance of 3 meals daily for glucose control. Encouraged pt to call hospital with any additional questions.     Thank you for your consult, SYLVIA to cont to follow.     Electronically signed by:  Ashley Carr RD  06/06/20 10:42

## 2020-06-06 NOTE — PLAN OF CARE
Problem: Patient Care Overview  Goal: Plan of Care Review  Flowsheets (Taken 6/6/2020 9940)  Progress: improving  Plan of Care Reviewed With: patient  Outcome Summary: Patient vitals are stable, alert and oriented. Blood glucose controlled with insulin. No acute changes. Will continue to monitor.

## 2020-06-06 NOTE — CONSULTS
Consults    Patient Identification:  Name:  Davian Newell  Age:  60 y.o.  Sex:  male  :  1960  MRN:  [id number]  Visit Number:  [id number]  Primary care provider:  Provider, No Known    Chief complaint: RLE cellulitis    History of presenting illness:  60 year old male with no known past medical history presented as an outpatient yesterday with a wound to the lateral aspect of his left foot. Wound began as a callus which he pulled off about two weeks ago. He had progressing pain, redness, and swelling to his foot. Denies nausea, vomiting, chills, fever.  ---------------------------------------------------------------------------------------------------------------------  Review of Systems:   Constitution: No chills, no rigors, no unexplained weight loss or weight gain  Respiratory: No cough, no hemoptysis  Cardiovascular: regular rate and rhythm  Gastrointestinal: No nausea, no vomiting, no hematemesis, no diarrhea or constipation, no melena  Integument: No pruritis and  no skin rash  Musculoskeletal: No joint pain, joint stiffness, joint swelling, muscle pain, muscle weakness and neck pain  Neurological: No dizziness, headaches, light headedness, seizures and vertigo  Endocrine: No frequent urination and nocturia, temperature intolerance, weight gain, unintended and weight loss, unintended  ---------------------------------------------------------------------------------------------------------------------   Past History:  History reviewed. No pertinent family history.  Past Medical History:   Diagnosis Date   • Arthritis    • Diabetes mellitus (CMS/HCC)      Past Surgical History:   Procedure Laterality Date   • ANKLE ARTHROSCOPY       Social History     Socioeconomic History   • Marital status: Single     Spouse name: Not on file   • Number of children: Not on file   • Years of education: Not on file   • Highest education level: Not on file   Tobacco Use   • Smoking status: Never Smoker   Substance  and Sexual Activity   • Alcohol use: Yes   • Drug use: No   • Sexual activity: Defer     ---------------------------------------------------------------------------------------------------------------------   Allergies:  Patient has no known allergies.  ---------------------------------------------------------------------------------------------------------------------   Prior to Admission Medications     None        Hospital Meds:    heparin (porcine) 5,000 Units Subcutaneous Q12H   insulin aspart 0-9 Units Subcutaneous TID AC   piperacillin-tazobactam 3.375 g Intravenous Q8H   sodium chloride 10 mL Intravenous Q12H   vancomycin 1,250 mg Intravenous Q12H        ---------------------------------------------------------------------------------------------------------------------   Vital Signs:  Temp:  [98.1 °F (36.7 °C)-99.5 °F (37.5 °C)] 98.1 °F (36.7 °C)  Heart Rate:  [60-95] 60  Resp:  [16-18] 18  BP: (101-148)/(55-84) 109/55      06/05/20  1519   Weight: 81.6 kg (180 lb)     Body mass index is 25.83 kg/m².  ---------------------------------------------------------------------------------------------------------------------   Physical exam:  Vascular: 2/4 DP and 2/4 PT. Brisk capillary refill all digits. Hair growth present.    Neurological: Vibratory, light touch, and proprioception diminished.    Dermatological:   Ulcer:   Location: plantar right 5th metatarsal head  Size: 1.5cm x 1.3cm, probes to capsule, no probe to bone  Drainage: mild purulent  Ascending cellulitis from ulceration, both plantar and dorsal foot to ankle, lymphangitis medial leg    Musculoskeletal: MMT 5/5. Pain on palpation ulceration. Full, fluid ROM without pain bilateral foot and ankle. All compartments soft and compressible.     ---------------------------------------------------------------------------------------------------------------------   Results from last 7 days   Lab Units 06/06/20  0324 06/05/20  1627   CRP mg/dL 6.95* 8.32*      LACTATE mmol/L  --  1.7   WBC 10*3/mm3 10.60 13.69*   HEMOGLOBIN g/dL 12.4* 13.9   HEMATOCRIT % 37.1* 42.1   MCV fL 90.3 90.7   MCHC g/dL 33.4 33.0   PLATELETS 10*3/mm3 387 461*         Results from last 7 days   Lab Units 06/06/20  0324 06/05/20  1627   SODIUM mmol/L 130* 136   POTASSIUM mmol/L 4.2 4.4   CHLORIDE mmol/L 100 98   CO2 mmol/L 22.1 26.6   BUN mg/dL 13 19   CREATININE mg/dL 0.66* 0.94   EGFR IF NONAFRICN AM mL/min/1.73 123 82   CALCIUM mg/dL 8.7 9.7   GLUCOSE mg/dL 261* 344*   ALBUMIN g/dL 2.85* 3.90   BILIRUBIN mg/dL 0.3 0.3   ALK PHOS U/L 87 114   AST (SGOT) U/L 10 14   ALT (SGPT) U/L 8 11   Estimated Creatinine Clearance: 137.4 mL/min (A) (by C-G formula based on SCr of 0.66 mg/dL (L)).  No results found for: AMMONIA          Lab Results   Component Value Date    HGBA1C 9.80 (H) 06/05/2020     No results found for: TSH, FREET4  No results found for: PREGTESTUR, PREGSERUM, HCG, HCGQUANT  Pain Management Panel     There is no flowsheet data to display.        No results found for: BLOODCX  No results found for: URINECX  No results found for: WOUNDCX  No results found for: STOOLCX      ---------------------------------------------------------------------------------------------------------------------   Imaging Results (Last 7 Days)     Procedure Component Value Units Date/Time    CT Lower Extremity Right Without Contrast [[id number]] Collected:  06/05/20 1718     Updated:  06/05/20 1721    Narrative:       EXAMINATION: CT LOWER EXTREMITY RIGHT WO CONTRAST-      CLINICAL INDICATION: swelling, tunneling wound, pain        COMPARISON: None available           DOSE:     Radiation dose reduction techniques were utilized per ALARA protocol.  Automated exposure control was initiated through either or [employer] or  DoseRight software packages by  protocol.              TECHNIQUE: Axial images were acquired through [employer] ankle and foot  without IV contrast. Reformatted images were created.      FINDINGS:  Today's study shows postoperative hardware extending through the distal  tibia and talus.     I do not see drainable fluid collection. There is some subcutaneous  edema noted.     No evidence of osteomyelitis on today's study.       Impression:       Postoperative change and arthritic change but no evidence of  osteomyelitis or drainable fluid collection on the presented exam     This report was finalized on 6/5/2020 5:19 PM by Dr. Hussein Auguste MD.       XR Foot 3+ View Right [[id number]] Collected:  06/05/20 1649     Updated:  06/05/20 1654    Narrative:       EXAMINATION: XR FOOT 3+ VW RIGHT-      CLINICAL INDICATION: foot swelling pain        COMPARISON: None available     FINDINGS: 3 views of the right foot show appearance equivocal for  fracture involving the distal aspect of the fifth metatarsal. I do  recommend clinical correlation for point tenderness.     No focal erosive changes are identified to suggest osteomyelitis.      This report was finalized on 6/5/2020 4:51 PM by Dr. Hussein Auguste MD.           ----------------------------------------------------------------------------------------------------------------------  Assessment and Plan:  60 year old male with right lateral foot ulceration and associated cellulitis and sepsis    -Imaging reviewed: no evidence of osteomyelitis or drainable fluid collection  -Bedside irrigation performed today - no plans on surgical intervention. Will monitor cellulitis and trend CRP while on IV antibiotics, once resolved, probable discharge with PO antibotics.  -Continue IV Vancomycin and Zosyn - culture GPC, GNB, final pending  -Newly diagnosed diabetes with HbA1C 9.8% - managed by hospitalist  -Betadine dressings daily by nursing, heel touch WB RLE in surgical shoe - orders placed        Thank you for the consult.    Daisy Gerard DPM  06/06/20  08:02

## 2020-06-06 NOTE — PROGRESS NOTES
Pharmacy was consulted for vancomycin dosing for SSTI.  Based on pt parameters will initiate vancomycin at 1250 mg iv q12hrs after the 1750 mg x 1 loading dose to target trough levels of 15-20 mg/L.  Pharmacy will continue to follow and obtain trough level once steady state is achieved.  Thank you.

## 2020-06-07 VITALS
RESPIRATION RATE: 18 BRPM | HEART RATE: 71 BPM | OXYGEN SATURATION: 97 % | WEIGHT: 180 LBS | SYSTOLIC BLOOD PRESSURE: 106 MMHG | BODY MASS INDEX: 25.77 KG/M2 | DIASTOLIC BLOOD PRESSURE: 65 MMHG | HEIGHT: 70 IN | TEMPERATURE: 97.9 F

## 2020-06-07 PROBLEM — A41.9 SEPSIS DUE TO CELLULITIS (HCC): Status: RESOLVED | Noted: 2020-06-05 | Resolved: 2020-06-07

## 2020-06-07 PROBLEM — L03.90 SEPSIS DUE TO CELLULITIS (HCC): Status: RESOLVED | Noted: 2020-06-05 | Resolved: 2020-06-07

## 2020-06-07 LAB
ANION GAP SERPL CALCULATED.3IONS-SCNC: 9 MMOL/L (ref 5–15)
BACTERIA SPEC AEROBE CULT: ABNORMAL
BASOPHILS # BLD AUTO: 0.05 10*3/MM3 (ref 0–0.2)
BASOPHILS NFR BLD AUTO: 0.6 % (ref 0–1.5)
BUN BLD-MCNC: 11 MG/DL (ref 8–23)
BUN/CREAT SERPL: 16.2 (ref 7–25)
CALCIUM SPEC-SCNC: 8.8 MG/DL (ref 8.6–10.5)
CHLORIDE SERPL-SCNC: 100 MMOL/L (ref 98–107)
CO2 SERPL-SCNC: 24 MMOL/L (ref 22–29)
CREAT BLD-MCNC: 0.68 MG/DL (ref 0.76–1.27)
CRP SERPL-MCNC: 10.22 MG/DL (ref 0–0.5)
DEPRECATED RDW RBC AUTO: 38.2 FL (ref 37–54)
EOSINOPHIL # BLD AUTO: 0.17 10*3/MM3 (ref 0–0.4)
EOSINOPHIL NFR BLD AUTO: 2 % (ref 0.3–6.2)
ERYTHROCYTE [DISTWIDTH] IN BLOOD BY AUTOMATED COUNT: 11.8 % (ref 12.3–15.4)
GFR SERPL CREATININE-BSD FRML MDRD: 119 ML/MIN/1.73
GLUCOSE BLD-MCNC: 195 MG/DL (ref 65–99)
GLUCOSE BLDC GLUCOMTR-MCNC: 201 MG/DL (ref 70–130)
GLUCOSE BLDC GLUCOMTR-MCNC: 202 MG/DL (ref 70–130)
GRAM STN SPEC: ABNORMAL
HCT VFR BLD AUTO: 37 % (ref 37.5–51)
HGB BLD-MCNC: 12.3 G/DL (ref 13–17.7)
IMM GRANULOCYTES # BLD AUTO: 0.03 10*3/MM3 (ref 0–0.05)
IMM GRANULOCYTES NFR BLD AUTO: 0.3 % (ref 0–0.5)
LYMPHOCYTES # BLD AUTO: 1.48 10*3/MM3 (ref 0.7–3.1)
LYMPHOCYTES NFR BLD AUTO: 17.1 % (ref 19.6–45.3)
MCH RBC QN AUTO: 29.6 PG (ref 26.6–33)
MCHC RBC AUTO-ENTMCNC: 33.2 G/DL (ref 31.5–35.7)
MCV RBC AUTO: 89.2 FL (ref 79–97)
MONOCYTES # BLD AUTO: 0.89 10*3/MM3 (ref 0.1–0.9)
MONOCYTES NFR BLD AUTO: 10.3 % (ref 5–12)
NEUTROPHILS # BLD AUTO: 6.04 10*3/MM3 (ref 1.7–7)
NEUTROPHILS NFR BLD AUTO: 69.7 % (ref 42.7–76)
NRBC BLD AUTO-RTO: 0 /100 WBC (ref 0–0.2)
PLATELET # BLD AUTO: 386 10*3/MM3 (ref 140–450)
PMV BLD AUTO: 9 FL (ref 6–12)
POTASSIUM BLD-SCNC: 3.9 MMOL/L (ref 3.5–5.2)
RBC # BLD AUTO: 4.15 10*6/MM3 (ref 4.14–5.8)
SODIUM BLD-SCNC: 133 MMOL/L (ref 136–145)
STREP GROUPING: ABNORMAL
WBC NRBC COR # BLD: 8.66 10*3/MM3 (ref 3.4–10.8)

## 2020-06-07 PROCEDURE — 80048 BASIC METABOLIC PNL TOTAL CA: CPT | Performed by: INTERNAL MEDICINE

## 2020-06-07 PROCEDURE — 25010000002 HEPARIN (PORCINE) PER 1000 UNITS: Performed by: INTERNAL MEDICINE

## 2020-06-07 PROCEDURE — 63710000001 INSULIN ASPART PER 5 UNITS: Performed by: INTERNAL MEDICINE

## 2020-06-07 PROCEDURE — 86140 C-REACTIVE PROTEIN: CPT | Performed by: INTERNAL MEDICINE

## 2020-06-07 PROCEDURE — 85025 COMPLETE CBC W/AUTO DIFF WBC: CPT | Performed by: INTERNAL MEDICINE

## 2020-06-07 PROCEDURE — 99239 HOSP IP/OBS DSCHRG MGMT >30: CPT | Performed by: INTERNAL MEDICINE

## 2020-06-07 PROCEDURE — 82962 GLUCOSE BLOOD TEST: CPT

## 2020-06-07 RX ORDER — AMOXICILLIN AND CLAVULANATE POTASSIUM 875; 125 MG/1; MG/1
1 TABLET, FILM COATED ORAL 2 TIMES DAILY
Qty: 14 TABLET | Refills: 0 | Status: SHIPPED | OUTPATIENT
Start: 2020-06-07 | End: 2020-06-14

## 2020-06-07 RX ORDER — LANCETS 33 GAUGE
1 EACH MISCELLANEOUS
Qty: 100 EACH | Refills: 0 | Status: ON HOLD | OUTPATIENT
Start: 2020-06-07 | End: 2020-06-16

## 2020-06-07 RX ORDER — BLOOD SUGAR DIAGNOSTIC
1 STRIP MISCELLANEOUS
Qty: 100 EACH | Refills: 0 | Status: SHIPPED | OUTPATIENT
Start: 2020-06-07 | End: 2020-06-07 | Stop reason: SDUPTHER

## 2020-06-07 RX ORDER — DOXYCYCLINE 100 MG/1
100 CAPSULE ORAL 2 TIMES DAILY
Qty: 14 CAPSULE | Refills: 0 | Status: SHIPPED | OUTPATIENT
Start: 2020-06-07 | End: 2020-06-14

## 2020-06-07 RX ORDER — BLOOD-GLUCOSE METER
1 KIT MISCELLANEOUS
Qty: 1 EACH | Refills: 0 | Status: ON HOLD | OUTPATIENT
Start: 2020-06-07 | End: 2020-06-16

## 2020-06-07 RX ORDER — BLOOD-GLUCOSE METER
1 KIT MISCELLANEOUS
Qty: 1 EACH | Refills: 0 | Status: SHIPPED | OUTPATIENT
Start: 2020-06-07 | End: 2020-06-07 | Stop reason: SDUPTHER

## 2020-06-07 RX ORDER — CA/D3/MAG OX/ZINC/COP/MANG/BOR 600 MG-800
1 TABLET,CHEWABLE ORAL DAILY
Qty: 7 CAPSULE | Refills: 0 | Status: SHIPPED | OUTPATIENT
Start: 2020-06-07 | End: 2020-06-14

## 2020-06-07 RX ORDER — BLOOD SUGAR DIAGNOSTIC
1 STRIP MISCELLANEOUS
Qty: 100 EACH | Refills: 0 | Status: ON HOLD | OUTPATIENT
Start: 2020-06-07 | End: 2020-06-16

## 2020-06-07 RX ORDER — HYDROCODONE BITARTRATE AND ACETAMINOPHEN 5; 325 MG/1; MG/1
1 TABLET ORAL 2 TIMES DAILY PRN
Qty: 14 TABLET | Refills: 0 | Status: ON HOLD | OUTPATIENT
Start: 2020-06-07 | End: 2020-06-16

## 2020-06-07 RX ORDER — LANCETS 33 GAUGE
1 EACH MISCELLANEOUS
Qty: 100 EACH | Refills: 0 | Status: SHIPPED | OUTPATIENT
Start: 2020-06-07 | End: 2020-06-07 | Stop reason: SDUPTHER

## 2020-06-07 RX ADMIN — INSULIN ASPART 4 UNITS: 100 INJECTION, SOLUTION INTRAVENOUS; SUBCUTANEOUS at 11:44

## 2020-06-07 RX ADMIN — HEPARIN SODIUM 5000 UNITS: 5000 INJECTION INTRAVENOUS; SUBCUTANEOUS at 08:00

## 2020-06-07 RX ADMIN — METFORMIN HYDROCHLORIDE 500 MG: 500 TABLET ORAL at 08:00

## 2020-06-07 RX ADMIN — INSULIN ASPART 4 UNITS: 100 INJECTION, SOLUTION INTRAVENOUS; SUBCUTANEOUS at 07:59

## 2020-06-07 RX ADMIN — SODIUM CHLORIDE, PRESERVATIVE FREE 10 ML: 5 INJECTION INTRAVENOUS at 08:00

## 2020-06-07 NOTE — DISCHARGE SUMMARY
Discharge Summary:    Date of Admission: 6/5/2020  Date of Discharge:  6/7/2020    PCP: Provider, No Known    DISCHARGE DIAGNOSIS  -Sepsis, present on admission and secondary to right foot cellulitis with open wound  -Newly diagnosed diabetes mellitus type 2 that is uncontrolled with hemoglobin A1c 9.8%; discussed with patient regarding oral hypoglycemic medications versus insulin and patient has elected for oral medications at this time with dietary modification  -Pseudohyponatremia secondary to hyperglycemia  -Moderate hypoalbuminemia  -Reactive thrombocytosis  -Elevated blood pressure without known history of hypertension    SECONDARY DIAGNOSES  Past Medical History:   Diagnosis Date   • Arthritis    • Diabetes mellitus (CMS/[facility])        CONSULTS   Dr. Gerard-Podiatry    PROCEDURES PERFORMED  Bedside irrigation    HOSPITAL COURSE  Patient is a 60 y.o. male presented to [facility] complaining of right foot pain, swelling and draining wound. Please see the admitting history and physical for further details.      Patient was admitted to the medical surgical floor.  Patient was started on broad-spectrum IV antibiotics with vancomycin and Zosyn.  Wound culture was obtained.  C-RP and CBC were obtained.  Patient was found to be hyperglycemic with a blood glucose level at 344.  Hemoglobin A1c was ordered and found to be 9.8%.  Podiatry was consulted and patient underwent bedside irrigation.  Patient was initially started on sliding scale insulin.  After discussion with the patient on the day prior to discharge he elected for oral diabetic medications and was given a prescription for metformin at the time of discharge.  Patient was also encouraged regarding dietary modification.    The patient did not have a primary care provider; we have attempted to arrange a primary care provider.  Patient will need full body diabetic work-up to include eye examinations and further skin assessments.    Patient was very  "anxious for discharge home.  Patient was changed to IV Rocephin as wound culture revealed Streptococcus agalactiae.  The patient had no other acute complaints or complications during his hospitalization.  Patient was discharged home in a hemodynamically stable condition.  Patient is to follow-up with Dr. Gerard in approximately 1 week after the time of discharge.    CONDITION ON DISCHARGE  Stable.    VITAL SIGNS  BP 99/50 (BP Location: Left arm, Patient Position: Lying) Comment: r.n. was told  Pulse 73   Temp 97.8 °F (36.6 °C) (Oral)   Resp 18   Ht 177.8 cm (70\")   Wt 81.6 kg (180 lb)   SpO2 97%   BMI 25.83 kg/m²   Objective:  General Appearance:  Comfortable, well-appearing and in no acute distress.    Vital signs: (most recent): Blood pressure 99/50, pulse 73, temperature 97.8 °F (36.6 °C), temperature source Oral, resp. rate 18, height 177.8 cm (70\"), weight 81.6 kg (180 lb), SpO2 97 %.  Vital signs are normal.    HEENT: Normal HEENT exam.    Lungs:  Normal effort.  Breath sounds clear to auscultation.  No rales, wheezes or rhonchi.    Heart: Normal rate.  S1 normal and S2 normal.  No murmur, gallop or friction rub.   Chest: Symmetric chest wall expansion.   Abdomen: Abdomen is soft and non-distended.  Bowel sounds are normal.   There is no abdominal tenderness.     Extremities: Normal range of motion.  There is no deformity or dependent edema.    Pulses: Distal pulses are intact.    Neurological: Patient is alert and oriented to person, place and time.  Normal strength.  (Sensation intact.).    Skin:  Warm and dry.  No rash or ulceration. (Right foot examined; warmth and erythema improved and decreasing in size. Right lateral foot wound with serosanguinous drainage expressed from the inferior exit wound  )        Present during visit: POLO Burnett    DISCHARGE DISPOSITION   Home or Self Care    DISCHARGE MEDICATIONS     Discharge Medications      New Medications      Instructions Start Date   Alcohol Pads " 70 % pads   1 swab, Does not apply, 4 Times Daily Before Meals & Nightly      amoxicillin-clavulanate 875-125 MG per tablet  Commonly known as:  Augmentin   1 tablet, Oral, 2 Times Daily      doxycycline 100 MG capsule  Commonly known as:  MONODOX   100 mg, Oral, 2 Times Daily      glucose blood test strip  Commonly known as:  ONE TOUCH ULTRA TEST   1 each, Other, 4 Times Daily Before Meals & Nightly, Use as instructed      HYDROcodone-acetaminophen 5-325 MG per tablet  Commonly known as:  NORCO   1 tablet, Oral, 2 Times Daily PRN      Lactobacillus tablet   1 tablet, Oral, Daily      metFORMIN 500 MG tablet  Commonly known as:  GLUCOPHAGE   500 mg, Oral, 2 Times Daily With Meals      ONE TOUCH ULTRA MINI w/Device kit   1 each, Other, 4 Times Daily Before Meals & Nightly      OneTouch Delica Lancets 33G misc   1 each, Other, 4 Times Daily Before Meals & Nightly             DISCHARGE DIET  diabetic diet    ACTIVITY AT DISCHARGE   activity as tolerated      Additional Instructions for the Follow-ups that You Need to Schedule     Discharge Follow-up with PCP   As directed       Currently Documented PCP:    Provider, No Known    PCP Phone Number:    [phone]     Follow Up Details:  Any provider; [facility]; new patient; hospital follow up newly diagnosed diabetes and right foot wound         Discharge Follow-up with Specified Provider: Dr. Daisy Gerard; 1 Week   As directed      To:  Dr. Daisy Gerard    Follow Up:  1 Week    Follow Up Details:  Hospital follow up right foot cellulitis/wound               TEST  RESULTS PENDING AT DISCHARGE   Order Current Status    Blood Culture - Blood, Arm, Left Preliminary result    Blood Culture - Blood, Arm, Right Preliminary result        Blood cultures: No growth x5 days, 2/2 bottles    Carina Grossman DO  06/07/20  10:47      Time: greater than 30 minutes.

## 2020-06-07 NOTE — DISCHARGE INSTRUCTIONS
Wound Care - wet to dry dressing 1-2 times daily; keep covered    Use walking boot on right foot until discontinued

## 2020-06-07 NOTE — PLAN OF CARE
Has rested well this shift did some eduction on how to check glucose levels said pain was better tonight has been very pleasant and is hoping he can go home soon

## 2020-06-08 ENCOUNTER — TELEPHONE (OUTPATIENT)
Dept: MEDSURG UNIT | Facility: HOSPITAL | Age: 60
End: 2020-06-08

## 2020-06-08 NOTE — PAYOR COMM NOTE
"[facility]   AKOSUA JACOBSON  PHONE  [phone]  FAX  [phone]  NPI:  [id number]    PENDING REF#[id number]    Davian Zazueta (60 y.o. Male)     Date of Birth Social Security Number Address Home Phone MRN    1960  [address] [address] [phone] [id number]    [place] Mar[place] Status          [facility], [place] Single       Admission Date Admission Type Admitting Provider Attending Provider Department, Room/Bed    20 Emergency Carina Grossman DO  [address], 3330/1P    Discharge Date Discharge Disposition Discharge Destination        2020 Home or Self Care              Attending Provider:  (none)   Allergies:  No Known Allergies    Isolation:  None   Infection:  None   Code Status:  Prior    Ht:  177.8 cm (70\")   Wt:  81.6 kg (180 lb)    Admission Cmt:  None   Principal Problem:  Sepsis due to cellulitis (CMS/[facility]) [L03.90,A41.9]                 Active Insurance as of 2020     Primary Coverage     Payor Plan Insurance Group Employer/Plan Group    CIGNA MISC CIGNA [id number]     Coverage Address Coverage Phone Number Coverage Fax Number Effective Dates    [address] [phone]  2018 - None Entered    [facility] [id number]       Subscriber Name Subscriber Birth Date Member ID       DAVIAN ZAZUETA 1960 [id number]                 Emergency Contacts      (Rel.) Home Phone Work Phone Mobile Phone    GRIS HALL (Sister) [phone] -- --               History & Physical      Carina Grossman DO at 20 1802          Hospitalist History and Physical    Patient Identification:  Name: Davian Zazueta  Age/Sex: 60 y.o. male  :  1960  MRN: [id number]         Primary Care Physician: Provider, No Known    Chief Complaint   Patient presents with   • Foot Pain       History of Present Illness  Patient is a 60 y.o. male presents with the following: Right foot pain and redness, wound    Patient is a 60-year-old male with " "no significant past medical history who presents to the emergency department with a 2-week history of progressive right foot pain with erythema.    Patient states that he has had several calluses in the past as he has had surgery with steel plates in the right foot.  Patient states that he walks \"on my tiptoes.\"  He states that due to the way he walks he suffers from recurrent calluses.  The patient reports that he removed a callus from the neural aspect of the right foot and states that he \"pulled off some good skin by accident.\"  The patient reports a 3 to 4-day history of progressive redness.  He reports that the wound has been open for the past 2 weeks and he has been filling it with over-the-counter antibiotic ointment with a Band-Aid on top.  He denies any significant drainage.  He denies any injury.  He denies any fevers, chills, nausea and/or vomiting.    Questioning, patient denied any recent chest pains, shortness of air, chest pains, diarrhea and/or urinary symptoms.    When questioned regarding his elevated blood glucose level, patient denies any known history of diabetes and states that he has drank 2 energy drinks earlier today.    Patient was seen by Dr. Gerard today in her office who recommended he present to the emergency department as concerned that he may have osteomyelitis.  The patient states that his sister gave him 1 ciprofloxacin tablet but apart from this and the antibiotic ointment he has had no other therapy.    In the emergency department, patient's maximum temperature was 99.5, heart rate 95, blood pressure 144/84.  C-RP 8.32, CMP was remarkable for glucose of 344.  Blood cell count 13.69 with 84% neutrophils.  CT scan of the right lower extremity without contrast did not reveal any osteomyelitis or abscess.    Patient is seen and examined in ER 2, room 8 and is to be admitted to the medical surgical floor for further evaluation and management.    Past History:  No past medical history on " file.  Past Surgical History:   Procedure Laterality Date   • ANKLE ARTHROSCOPY       No family history on file.  Social History     Tobacco Use   • Smoking status: Never Smoker   Substance Use Topics   • Alcohol use: Yes   • Drug use: No       (Not in a hospital admission)  Allergies: Patient has no known allergies.    Review of Systems:  Review of Systems   Constitutional: Negative for chills, diaphoresis and fever.   HENT: Negative for hearing loss, tinnitus and trouble swallowing.    Eyes: Negative for discharge and visual disturbance.   Respiratory: Negative for cough, shortness of breath and wheezing.    Cardiovascular: Negative for chest pain, palpitations and leg swelling.   Gastrointestinal: Negative for abdominal pain, constipation, diarrhea, nausea and vomiting.   Endocrine: Negative for polydipsia, polyphagia and polyuria.   Genitourinary: Negative for dysuria, frequency and hematuria.   Musculoskeletal: Positive for gait problem. Negative for myalgias and neck pain.   Skin: Positive for color change and rash.   Neurological: Negative for dizziness, tremors, seizures, syncope, weakness and light-headedness.   Hematological: Does not bruise/bleed easily.   Psychiatric/Behavioral: Negative for confusion, hallucinations and suicidal ideas.      Vital Signs  Temp:  [98.1 °F (36.7 °C)] 98.1 °F (36.7 °C)  Heart Rate:  [73-95] 73  Resp:  [16] 16  BP: (144-148)/(81-82) 144/82  Body mass index is 25.83 kg/m².    Physical Exam:  Physical Exam   Constitutional: He is oriented to person, place, and time. He appears well-developed and well-nourished. No distress.   HENT:   Head: Normocephalic and atraumatic.   Mouth/Throat: Oropharynx is clear and moist.   Eyes: Pupils are equal, round, and reactive to light. Conjunctivae and EOM are normal.   Neck: Neck supple. No tracheal deviation present. No thyromegaly present.   Cardiovascular: Normal rate and regular rhythm. Exam reveals no gallop and no friction rub.   No  murmur heard.  Pulmonary/Chest: Breath sounds normal. No respiratory distress. He has no wheezes. He has no rales.   Abdominal: Soft. Bowel sounds are normal. He exhibits no distension. There is no tenderness. There is no guarding.   Musculoskeletal: Normal range of motion. He exhibits no tenderness.   Neurological: He is alert and oriented to person, place, and time. No cranial nerve deficit.   Skin: Skin is warm and dry. No rash noted. There is erythema.   Erythema noted on right foot from 2nd toe laterally around almost the entire foot. He has an open, rounded wound on the lateral aspect of the right foot. The dressing is mildly bloody. Wound bed is with slough.   Psychiatric: He has a normal mood and affect.      Results Review:    Results from last 7 days   Lab Units 06/05/20  1627   WBC 10*3/mm3 13.69*   HEMOGLOBIN g/dL 13.9   HEMATOCRIT % 42.1   PLATELETS 10*3/mm3 461*     Results from last 7 days   Lab Units 06/05/20  1627   SODIUM mmol/L 136   POTASSIUM mmol/L 4.4   CHLORIDE mmol/L 98   CO2 mmol/L 26.6   BUN mg/dL 19   CREATININE mg/dL 0.94   CALCIUM mg/dL 9.7   GLUCOSE mg/dL 344*     Results from last 7 days   Lab Units 06/05/20  1627   BILIRUBIN mg/dL 0.3   ALK PHOS U/L 114   AST (SGOT) U/L 14   ALT (SGPT) U/L 11     Results from last 7 days   Lab Units 06/05/20  1627   CRP mg/dL 8.32*     Imaging:    Xray images reviewed; no obvious osteomyelitis.    Imaging Results (Most Recent)     Procedure Component Value Units Date/Time    CT Lower Extremity Right Without Contrast [[id number]] Collected:  06/05/20 1718     Updated:  06/05/20 1721    Narrative:       EXAMINATION: CT LOWER EXTREMITY RIGHT WO CONTRAST-      CLINICAL INDICATION: swelling, tunneling wound, pain        COMPARISON: None available           DOSE:     Radiation dose reduction techniques were utilized per ALARA protocol.  Automated exposure control was initiated through either or [employer] or  [employer] software packages by   protocol.              TECHNIQUE: Axial images were acquired through [employer] ankle and foot  without IV contrast. Reformatted images were created.     FINDINGS:  Today's study shows postoperative hardware extending through the distal  tibia and talus.     I do not see drainable fluid collection. There is some subcutaneous  edema noted.     No evidence of osteomyelitis on today's study.       Impression:       Postoperative change and arthritic change but no evidence of  osteomyelitis or drainable fluid collection on the presented exam     This report was finalized on 6/5/2020 5:19 PM by Dr. Hussein Auguste MD.       XR Foot 3+ View Right [[id number]] Collected:  06/05/20 1649     Updated:  06/05/20 1654    Narrative:       EXAMINATION: XR FOOT 3+ VW RIGHT-      CLINICAL INDICATION: foot swelling pain        COMPARISON: None available     FINDINGS: 3 views of the right foot show appearance equivocal for  fracture involving the distal aspect of the fifth metatarsal. I do  recommend clinical correlation for point tenderness.     No focal erosive changes are identified to suggest osteomyelitis.      This report was finalized on 6/5/2020 4:51 PM by Dr. Hussein Auguste MD.             Assessment/Plan     -Sepsis, present upon admission with leukocytosis, heart rate greater than 90 and elevated C-RP secondary to a right foot cellulitis with open wound and hardware for the distal tibia and talus: He will be admitted to the medical surgical floor with telemetry.  He has been started on pharmacy to dose vancomycin and Zosyn.  Blood cultures and a wound culture were obtained in the emergency department.  Podiatry has been consulted and the patient will be treated by mouth after midnight in case debridement is indicated.  Patient will have as needed Norco for pain.  I will repeat his CBC and C-RP in a.m.  Need to follow culture results and tailor antibiotics accordingly.    -Hyperglycemia without known diabetes mellitus: Patient's  blood glucose levels significantly elevated at 344.  Hemoglobin A1c has been ordered.  Patient will be started on Accu-Cheks and the low-dose as needed sliding scale insulin.    -Active thrombocytosis secondary to #1: Repeat CBC in a.m.    -Blood pressure without known history of hypertension: We will monitor.  Pain likely exacerbating.  Hold on antihypertensive agent at this time.    DVT prophylaxis: Subcutaneous heparin    Estimated Length of Stay: > 2 MNs    I discussed the patients findings and my recommendations with patient      Carina Grossman DO  06/05/20  18:02    Electronically signed by Carina Grossman DO at 06/05/20 1845          Emergency Department Notes      Karen Silverman PA at 06/05/20 1710     Attestation signed by Rosemary Flores DO at 06/05/20 7832          For this patient encounter, I reviewed the NP or PA documentation, treatment plan, and medical decision making. Rosemary Flores DO 6/5/2020 21:53                  Subjective   59 y/o male patient presents to the ED today with complaints of right foot pain. Patient was referred to the ED by Dr. Daisy Gerard with podiatry. Patient states that a couple weeks ago he pulled a callous off his foot and thinks he may have taken some good skin with it. Pt states since then his foot has gotten red, warm, swollen and the area where the callous was will not heal. He seen Dr. Gerard today. She recommended him come to the ED for antibiotics.  Sister gave him 1 cipro but has not been on any other antibiotics.  No fevers at home.       History provided by:  Patient   used: No        Review of Systems   Constitutional: Negative.    HENT: Negative.    Eyes: Negative.    Respiratory: Negative.    Cardiovascular: Negative.    Gastrointestinal: Negative.    Endocrine: Negative.    Genitourinary: Negative.    Musculoskeletal:        Right foot pain     Skin: Positive for wound.   Allergic/Immunologic: Negative.       Neurological: Negative.    Hematological: Negative.    Psychiatric/Behavioral: Negative.    All other systems reviewed and are negative.      History reviewed. No pertinent past medical history.    No Known Allergies    Past Surgical History:   Procedure Laterality Date   • ANKLE ARTHROSCOPY         History reviewed. No pertinent family history.    Social History     Socioeconomic History   • Marital status: Single     Spouse name: Not on file   • Number of children: Not on file   • Years of education: Not on file   • Highest education level: Not on file   Tobacco Use   • Smoking status: Never Smoker   Substance and Sexual Activity   • Alcohol use: Yes   • Drug use: No   • Sexual activity: Defer           Objective   Physical Exam   Constitutional: He is oriented to person, place, and time. He appears well-developed and well-nourished.   HENT:   Head: Normocephalic and atraumatic.   Right Ear: External ear normal.   Left Ear: External ear normal.   Nose: Nose normal.   Mouth/Throat: Oropharynx is clear and moist.   Eyes: Pupils are equal, round, and reactive to light. Conjunctivae and EOM are normal.   Neck: Normal range of motion. Neck supple.   Cardiovascular: Normal rate, regular rhythm, normal heart sounds and intact distal pulses.   Pulmonary/Chest: Effort normal and breath sounds normal.   Abdominal: Soft. Bowel sounds are normal.   Musculoskeletal: Normal range of motion.        Neurological: He is alert and oriented to person, place, and time.   Skin: Skin is warm and dry. Capillary refill takes less than 2 seconds.   Nursing note and vitals reviewed.      Procedures          ED Course  ED Course as of Jun 05 1905 Fri Jun 05, 2020   1657 Narrative & Impression    EXAMINATION: XR FOOT 3+ VW RIGHT-      CLINICAL INDICATION: foot swelling pain        COMPARISON: None available     FINDINGS: 3 views of the right foot show appearance equivocal for  fracture involving the distal aspect of the fifth metatarsal.  I do  recommend clinical correlation for point tenderness.     No focal erosive changes are identified to suggest osteomyelitis.      This report was finalized on 6/5/2020 4:51 PM by Dr. Hussein Auguste MD.       XR Foot 3+ View Right [ML]   1658 I spoke with Dr. Daisy Gerard on the phone. She stated she did an xray in office and seen spot on the bone worrisome of osteomyelitis and would recommend CT scan and admission to the hospitalist service.     [ML]   1728    IMPRESSION:  Postoperative change and arthritic change but no evidence of  osteomyelitis or drainable fluid collection on the presented exam     This report was finalized on 6/5/2020 5:19 PM by Dr. Hussein Auguste MD.   CT Lower Extremity Right Without Contrast [ML]   1734 Paged hospitalist     [ML]   1740 Dr. Grossman will accept the patient.     [ML]   1813 I personally saw and evaluated this patient and agree with Karen's plan and documentation.  Patient is currently resting comfortably with no complaints.    [EG]      ED Course User Index  [EG] Rosemary Flores DO  [ML] Karen Silverman PA                                           MDM  Number of Diagnoses or Management Options  Cellulitis of foot, right:   Hyperglycemia:      Amount and/or Complexity of Data Reviewed  Clinical lab tests: ordered and reviewed  Tests in the radiology section of CPT®:  ordered and reviewed  Discuss the patient with other providers: yes    Risk of Complications, Morbidity, and/or Mortality  Presenting problems: moderate  Diagnostic procedures: moderate  Management options: moderate    Patient Progress  Patient progress: stable      Final diagnoses:   Cellulitis of foot, right   Hyperglycemia            Karen Silverman PA  06/05/20 1742       Karen Silverman PA  06/05/20 1905      Electronically signed by Rosemary Flores DO at 06/05/20 7497         No current facility-administered medications for this encounter.      Current Outpatient Medications    Medication Sig Dispense Refill   • Alcohol Swabs (ALCOHOL PADS) 70 % pads 1 swab 4 (Four) Times a Day Before Meals & at Bedtime. 100 each 0   • amoxicillin-clavulanate (Augmentin) 875-125 MG per tablet Take 1 tablet by mouth 2 (Two) Times a Day for 7 days. 14 tablet 0   • Blood Glucose Monitoring Suppl (ONE TOUCH ULTRA MINI) w/Device kit 1 each by Other route 4 (Four) Times a Day Before Meals & at Bedtime. 1 each 0   • doxycycline (MONODOX) 100 MG capsule Take 1 capsule by mouth 2 (Two) Times a Day for 7 days. 14 capsule 0   • glucose blood (ONE TOUCH ULTRA TEST) test strip 1 each by Other route 4 (Four) Times a Day Before Meals & at Bedtime. Use as instructed 200 each 0   • HYDROcodone-acetaminophen (NORCO) 5-325 MG per tablet Take 1 tablet by mouth 2 (Two) Times a Day As Needed for Moderate Pain  for up to 8 days. 14 tablet 0   • Probiotic Product (PROBIOTIC ADVANCED) capsule Take 1 tablet by mouth Daily for 7 days. 7 capsule 0   • metFORMIN (GLUCOPHAGE) 500 MG tablet Take 1 tablet by mouth 2 (Two) Times a Day With Meals. 60 tablet 0   • OneTouch Delica Lancets 33G misc 1 each by Other route 4 (Four) Times a Day Before Meals & at Bedtime. 100 each 0     Orders (last 24 hrs)      Start     Ordered    06/07/20 0000  HYDROcodone-acetaminophen (NORCO) 5-325 MG per tablet  2 Times Daily PRN      06/07/20 1045    06/07/20 0000  metFORMIN (GLUCOPHAGE) 500 MG tablet  2 Times Daily With Meals      06/07/20 1045    06/07/20 0000  Probiotic Product (PROBIOTIC ADVANCED) capsule  Daily      06/07/20 1045    06/07/20 0000  amoxicillin-clavulanate (Augmentin) 875-125 MG per tablet  2 Times Daily      06/07/20 1045    06/07/20 0000  doxycycline (MONODOX) 100 MG capsule  2 Times Daily      06/07/20 1045    06/07/20 0000  Discharge Follow-up with Specified Provider: Dr. Daisy Gerard; 1 Week      06/07/20 1045    06/07/20 0000  Discharge Follow-up with PCP      06/07/20 1045    06/07/20 0000  Check Blood Glucose - Fingerstick       06/07/20 1045    06/07/20 0000  Gradually Increase Activity Until at Pre-Hospitalization Level      06/07/20 1045    06/07/20 0000  Diet: Consistent Carbohydrate      06/07/20 1045    06/07/20 0000  Alcohol Swabs (ALCOHOL PADS) 70 % pads  4 Times Daily Before Meals & Nightly      06/07/20 1134    06/07/20 0000  Blood Glucose Monitoring Suppl (ONE TOUCH ULTRA MINI) w/Device kit  4 Times Daily Before Meals & Nightly      06/07/20 1134    06/07/20 0000  glucose blood (ONE TOUCH ULTRA TEST) test strip  4 Times Daily Before Meals & Nightly      06/07/20 1134    06/07/20 0000  OneTouch Delica Lancets 33G misc  4 Times Daily Before Meals & Nightly      06/07/20 1134    06/06/20 1800  metFORMIN (GLUCOPHAGE) tablet 500 mg  2 Times Daily With Meals,   Status:  Discontinued      06/06/20 1506    06/06/20 1700  cefTRIAXone (ROCEPHIN) 2 g/100 mL 0.9% NS VTB (PRAKASH)  Every 24 Hours,   Status:  Discontinued      06/06/20 1505    06/06/20 0730  insulin aspart (novoLOG) injection 0-9 Units  3 Times Daily Before Meals,   Status:  Discontinued      06/05/20 1920 06/05/20 2200  POC Glucose 4x Daily AC & at Bedtime  4 Times Daily Before Meals & at Bedtime,   Status:  Canceled      06/05/20 1920 06/05/20 2100  sodium chloride 0.9 % flush 10 mL  Every 12 Hours Scheduled,   Status:  Discontinued      06/05/20 1920 06/05/20 2100  heparin (porcine) 5000 UNIT/ML injection 5,000 Units  Every 12 Hours Scheduled,   Status:  Discontinued      06/05/20 1920 06/05/20 1920  HYDROcodone-acetaminophen (NORCO) 5-325 MG per tablet 1 tablet  Every 6 Hours PRN,   Status:  Discontinued      06/05/20 1920 06/05/20 1920  acetaminophen (TYLENOL) tablet 650 mg  Every 6 Hours PRN,   Status:  Discontinued      06/05/20 1920 06/05/20 1920  dextrose (GLUTOSE) oral gel 15 g  Every 15 Minutes PRN,   Status:  Discontinued      06/05/20 1920 06/05/20 1920  glucagon (human recombinant) (GLUCAGEN DIAGNOSTIC) injection 1 mg  Every 15 Minutes PRN,    Status:  Discontinued      20 1920  dextrose (D50W) 25 g/ 50mL Intravenous Solution 25 g  Every 15 Minutes PRN,   Status:  Discontinued      20 19220 1920  sodium chloride 0.9 % flush 10 mL  As Needed,   Status:  Discontinued      20 19220 1920  Up With Assistance  As Needed,   Status:  Canceled      20 161  sodium chloride 0.9 % flush 10 mL  As Needed,   Status:  Discontinued      20 1611                   Physician Progress Notes (last 72 hours) (Notes from 20 1209 through 20 1209)      Carina Grossman, DO at 20 1454          HOSPITALIST PROGRESS NOTE    Patient Identification:  Name:  Davian Newell  Age:  60 y.o.  Sex:  male  :  1960  MRN:  [id number]  Visit Number:  [id number]  Primary Care Provider:  Provider, No Known    Length of stay:  1     HPI: 60-year-old male being seen in follow-up for right foot wound and cellulitis, newly diagnosed diabetes    Subjective:  Patient seen earlier this afternoon he is resting in bed.  Patient has no complaints at this time.  He currently denies any pain in his foot.  He states that the Norco he received yesterday evening after arrival to the floor did improve his pain.  The patient has any shortness of air and/or chest pain.    Present during exam: POLO Burnett    Current Hospital Meds:    heparin (porcine) 5,000 Units Subcutaneous Q12H   insulin aspart 0-9 Units Subcutaneous TID AC   piperacillin-tazobactam 3.375 g Intravenous Q8H   sodium chloride 10 mL Intravenous Q12H   vancomycin 1,250 mg Intravenous Q12H     Vital Signs  Temp:  [98 °F (36.7 °C)-99.5 °F (37.5 °C)] 98 °F (36.7 °C)  Heart Rate:  [60-95] 64  Resp:  [16-18] 18  BP: (101-148)/(55-84) 105/64      20  1519   Weight: 81.6 kg (180 lb)     Body mass index is 25.83 kg/m².    Physical exam:  Physical Exam   Constitutional: He is oriented to person, place, and time. He appears well-developed  and well-nourished. No distress.   HENT:   Head: Normocephalic and atraumatic.   Mouth/Throat: Oropharynx is clear and moist.   Eyes: Pupils are equal, round, and reactive to light. Conjunctivae and EOM are normal.   Neck: Neck supple. No tracheal deviation present.   Cardiovascular: Normal rate and regular rhythm. Exam reveals no gallop and no friction rub.   No murmur heard.  Pulmonary/Chest: Breath sounds normal. No respiratory distress. He has no wheezes. He has no rales.   Abdominal: Soft. Bowel sounds are normal. He exhibits no distension. There is no tenderness. There is no guarding.   Musculoskeletal: He exhibits no tenderness.   R foot has recently been dressed in kerlix   Neurological: He is alert and oriented to person, place, and time. No cranial nerve deficit.   Skin: Skin is warm and dry. No rash noted. No erythema.   Psychiatric: He has a normal mood and affect.     Results Review:  Results from last 7 days   Lab Units 06/06/20  0324 06/05/20  1627   WBC 10*3/mm3 10.60 13.69*   HEMOGLOBIN g/dL 12.4* 13.9   HEMATOCRIT % 37.1* 42.1   PLATELETS 10*3/mm3 387 461*     Results from last 7 days   Lab Units 06/06/20  0324 06/05/20  1627   SODIUM mmol/L 130* 136   POTASSIUM mmol/L 4.2 4.4   CHLORIDE mmol/L 100 98   CO2 mmol/L 22.1 26.6   BUN mg/dL 13 19   CREATININE mg/dL 0.66* 0.94   CALCIUM mg/dL 8.7 9.7   GLUCOSE mg/dL 261* 344*     Results from last 7 days   Lab Units 06/06/20  0324 06/05/20  1627   BILIRUBIN mg/dL 0.3 0.3   ALK PHOS U/L 87 114   AST (SGOT) U/L 10 14   ALT (SGPT) U/L 8 11     HgbA1c: Results for CAESAR ZAZUETA (MRN [id number]) as of 6/6/2020 14:58   Ref. Range 6/5/2020 16:27   Hemoglobin A1C Latest Ref Range: 4.80 - 5.60 % 9.80 (H)             Assessment/Plan     -Sepsis, present on admission and secondary to right foot cellulitis with open wound status post bedside irrigation per podiatry today: Based on preliminary culture results, I will de-escalate patient to IV Rocephin.  C-RP  remains elevated but has improved to 6.95.  Repeat CBC and C-RP in a.m.    -Newly diagnosed diabetes mellitus type 2 that is uncontrolled with hemoglobin A1c 9.8%: Discussed with patient regarding oral anti-glycemic medications versus insulin.  Patient states that he would prefer to start with an oral agent and work on diet and exercise with a repeat hemoglobin A1c in the future per his primary care provider.  Plan to begin metformin this evening while hospitalized and continue with the as needed sliding scale insulin.    -Hyponatremia, pseudohyponatremia secondary to hyperglycemia with a corrected sodium level of 134 which is slightly low.  Repeat chemistry panel in a.m.    -Moderate hypoalbuminemia.    The patient is high risk due to the following diagnoses/reasons: Sepsis, newly diagnosed diabetes mellitus    I discussed the patients findings and my recommendations with patient and nursing staff.        Disposition  Home soon; likely 1-2 days      Carina Grossman DO  20  14:55        Electronically signed by Carina Grossman DO at 20 1504          Consult Notes (last 72 hours) (Notes from 20 1209 through 20 1209)      Daisy Gerard DPM at 20 0802      Consult Orders    1. Inpatient Podiatry Consult [[id number]] ordered by Carina Grossman DO at 20 1746                    Consults    Patient Identification:  Name:  Davian Newell  Age:  60 y.o.  Sex:  male  :  1960  MRN:  [id number]  Visit Number:  [id number]  Primary care provider:  Provider, No Known    Chief complaint: RLE cellulitis    History of presenting illness:  60 year old male with no known past medical history presented as an outpatient yesterday with a wound to the lateral aspect of his left foot. Wound began as a callus which he pulled off about two weeks ago. He had progressing pain, redness, and swelling to his foot. Denies nausea, vomiting, chills,  fever.  ---------------------------------------------------------------------------------------------------------------------  Review of Systems:   Constitution: No chills, no rigors, no unexplained weight loss or weight gain  Respiratory: No cough, no hemoptysis  Cardiovascular: regular rate and rhythm  Gastrointestinal: No nausea, no vomiting, no hematemesis, no diarrhea or constipation, no melena  Integument: No pruritis and  no skin rash  Musculoskeletal: No joint pain, joint stiffness, joint swelling, muscle pain, muscle weakness and neck pain  Neurological: No dizziness, headaches, light headedness, seizures and vertigo  Endocrine: No frequent urination and nocturia, temperature intolerance, weight gain, unintended and weight loss, unintended  ---------------------------------------------------------------------------------------------------------------------   Past History:  History reviewed. No pertinent family history.  Past Medical History:   Diagnosis Date   • Arthritis    • Diabetes mellitus (CMS/[facility])      Past Surgical History:   Procedure Laterality Date   • ANKLE ARTHROSCOPY       Social History     Socioeconomic History   • Marital status: Single     Spouse name: Not on file   • Number of children: Not on file   • Years of education: Not on file   • Highest education level: Not on file   Tobacco Use   • Smoking status: Never Smoker   Substance and Sexual Activity   • Alcohol use: Yes   • Drug use: No   • Sexual activity: Defer     ---------------------------------------------------------------------------------------------------------------------   Allergies:  Patient has no known allergies.  ---------------------------------------------------------------------------------------------------------------------   Prior to Admission Medications     None        Hospital Meds:    heparin (porcine) 5,000 Units Subcutaneous Q12H   insulin aspart 0-9 Units Subcutaneous TID AC   piperacillin-tazobactam 3.375 g  Intravenous Q8H   sodium chloride 10 mL Intravenous Q12H   vancomycin 1,250 mg Intravenous Q12H        ---------------------------------------------------------------------------------------------------------------------   Vital Signs:  Temp:  [98.1 °F (36.7 °C)-99.5 °F (37.5 °C)] 98.1 °F (36.7 °C)  Heart Rate:  [60-95] 60  Resp:  [16-18] 18  BP: (101-148)/(55-84) 109/55      06/05/20  1519   Weight: 81.6 kg (180 lb)     Body mass index is 25.83 kg/m².  ---------------------------------------------------------------------------------------------------------------------   Physical exam:  Vascular: 2/4 DP and 2/4 PT. Brisk capillary refill all digits. Hair growth present.    Neurological: Vibratory, light touch, and proprioception diminished.    Dermatological:   Ulcer:   Location: plantar right 5th metatarsal head  Size: 1.5cm x 1.3cm, probes to capsule, no probe to bone  Drainage: mild purulent  Ascending cellulitis from ulceration, both plantar and dorsal foot to ankle, lymphangitis medial leg    Musculoskeletal: MMT 5/5. Pain on palpation ulceration. Full, fluid ROM without pain bilateral foot and ankle. All compartments soft and compressible.     ---------------------------------------------------------------------------------------------------------------------   Results from last 7 days   Lab Units 06/06/20 0324 06/05/20  1627   CRP mg/dL 6.95* 8.32*   LACTATE mmol/L  --  1.7   WBC 10*3/mm3 10.60 13.69*   HEMOGLOBIN g/dL 12.4* 13.9   HEMATOCRIT % 37.1* 42.1   MCV fL 90.3 90.7   MCHC g/dL 33.4 33.0   PLATELETS 10*3/mm3 387 461*         Results from last 7 days   Lab Units 06/06/20 0324 06/05/20  1627   SODIUM mmol/L 130* 136   POTASSIUM mmol/L 4.2 4.4   CHLORIDE mmol/L 100 98   CO2 mmol/L 22.1 26.6   BUN mg/dL 13 19   CREATININE mg/dL 0.66* 0.94   EGFR IF NONAFRICN AM mL/min/1.73 123 82   CALCIUM mg/dL 8.7 9.7   GLUCOSE mg/dL 261* 344*   ALBUMIN g/dL 2.85* 3.90   BILIRUBIN mg/dL 0.3 0.3   ALK PHOS U/L 87 114      AST (SGOT) U/L 10 14   ALT (SGPT) U/L 8 11   Estimated Creatinine Clearance: 137.4 mL/min (A) (by C-G formula based on SCr of 0.66 mg/dL (L)).  No results found for: AMMONIA          Lab Results   Component Value Date    HGBA1C 9.80 (H) 06/05/2020     No results found for: TSH, FREET4  No results found for: PREGTESTUR, PREGSERUM, HCG, HCGQUANT  Pain Management Panel     There is no flowsheet data to display.        No results found for: BLOODCX  No results found for: URINECX  No results found for: WOUNDCX  No results found for: STOOLCX      ---------------------------------------------------------------------------------------------------------------------   Imaging Results (Last 7 Days)     Procedure Component Value Units Date/Time    CT Lower Extremity Right Without Contrast [[id number]] Collected:  06/05/20 1718     Updated:  06/05/20 1721    Narrative:       EXAMINATION: CT LOWER EXTREMITY RIGHT WO CONTRAST-      CLINICAL INDICATION: swelling, tunneling wound, pain        COMPARISON: None available           DOSE:     Radiation dose reduction techniques were utilized per ALARA protocol.  Automated exposure control was initiated through either or Wakozi or  DoseRight software packages by  protocol.              TECHNIQUE: Axial images were acquired through THE MELT ankle and foot  without IV contrast. Reformatted images were created.     FINDINGS:  Today's study shows postoperative hardware extending through the distal  tibia and talus.     I do not see drainable fluid collection. There is some subcutaneous  edema noted.     No evidence of osteomyelitis on today's study.       Impression:       Postoperative change and arthritic change but no evidence of  osteomyelitis or drainable fluid collection on the presented exam     This report was finalized on 6/5/2020 5:19 PM by Dr. Hussein Auguste MD.       XR Foot 3+ View Right [[id number]] Collected:  06/05/20 1649     Updated:  06/05/20 1654    Narrative:        EXAMINATION: XR FOOT 3+ VW RIGHT-      CLINICAL INDICATION: foot swelling pain        COMPARISON: None available     FINDINGS: 3 views of the right foot show appearance equivocal for  fracture involving the distal aspect of the fifth metatarsal. I do  recommend clinical correlation for point tenderness.     No focal erosive changes are identified to suggest osteomyelitis.      This report was finalized on 6/5/2020 4:51 PM by Dr. Hussein Auguste MD.           ----------------------------------------------------------------------------------------------------------------------  Assessment and Plan:  60 year old male with right lateral foot ulceration and associated cellulitis and sepsis    -Imaging reviewed: no evidence of osteomyelitis or drainable fluid collection  -Bedside irrigation performed today - no plans on surgical intervention. Will monitor cellulitis and trend CRP while on IV antibiotics, once resolved, probable discharge with PO antibotics.  -Continue IV Vancomycin and Zosyn - culture GPC, GNB, final pending  -Newly diagnosed diabetes with HbA1C 9.8% - managed by hospitalist  -Betadine dressings daily by nursing, heel touch WB RLE in surgical shoe - orders placed        Thank you for the consult.    Daisy Gerard DPM  06/06/20  08:02      Electronically signed by Daisy Gerard DPM at 06/06/20 0814

## 2020-06-10 LAB
BACTERIA SPEC AEROBE CULT: NORMAL
BACTERIA SPEC AEROBE CULT: NORMAL

## 2020-06-12 ENCOUNTER — LAB (OUTPATIENT)
Dept: LAB | Facility: HOSPITAL | Age: 60
End: 2020-06-12

## 2020-06-12 ENCOUNTER — PREP FOR SURGERY (OUTPATIENT)
Dept: OTHER | Facility: HOSPITAL | Age: 60
End: 2020-06-12

## 2020-06-12 ENCOUNTER — TRANSCRIBE ORDERS (OUTPATIENT)
Dept: LAB | Facility: HOSPITAL | Age: 60
End: 2020-06-12

## 2020-06-12 DIAGNOSIS — L02.611 ABSCESS OF RIGHT FOOT: Primary | ICD-10-CM

## 2020-06-12 DIAGNOSIS — Z01.818 PRE-OPERATIVE CLEARANCE: Primary | ICD-10-CM

## 2020-06-12 DIAGNOSIS — Z01.818 PRE-OPERATIVE CLEARANCE: ICD-10-CM

## 2020-06-12 PROCEDURE — U0004 COV-19 TEST NON-CDC HGH THRU: HCPCS

## 2020-06-12 PROCEDURE — U0002 COVID-19 LAB TEST NON-CDC: HCPCS

## 2020-06-12 RX ORDER — CEFAZOLIN SODIUM 2 G/50ML
2 SOLUTION INTRAVENOUS ONCE
Status: CANCELLED | OUTPATIENT
Start: 2020-06-16 | End: 2020-06-12

## 2020-06-15 LAB
REF LAB TEST METHOD: NORMAL
SARS-COV-2 RNA RESP QL NAA+PROBE: NOT DETECTED

## 2020-06-16 ENCOUNTER — ANESTHESIA EVENT (OUTPATIENT)
Dept: PERIOP | Facility: HOSPITAL | Age: 60
End: 2020-06-16

## 2020-06-16 ENCOUNTER — HOSPITAL ENCOUNTER (INPATIENT)
Facility: HOSPITAL | Age: 60
LOS: 3 days | Discharge: HOME | End: 2020-06-19
Attending: PODIATRIST | Admitting: INTERNAL MEDICINE

## 2020-06-16 ENCOUNTER — ANESTHESIA (OUTPATIENT)
Dept: PERIOP | Facility: HOSPITAL | Age: 60
End: 2020-06-16

## 2020-06-16 DIAGNOSIS — L02.611 ABSCESS OF RIGHT FOOT: ICD-10-CM

## 2020-06-16 LAB
ALBUMIN SERPL-MCNC: 3.07 G/DL (ref 3.5–5.2)
ALBUMIN/GLOB SERPL: 0.7 G/DL
ALP SERPL-CCNC: 77 U/L (ref 39–117)
ALT SERPL W P-5'-P-CCNC: 12 U/L (ref 1–41)
ANION GAP SERPL CALCULATED.3IONS-SCNC: 8.1 MMOL/L (ref 5–15)
ANION GAP SERPL CALCULATED.3IONS-SCNC: 8.8 MMOL/L (ref 5–15)
AST SERPL-CCNC: 16 U/L (ref 1–40)
BASOPHILS # BLD AUTO: 0.04 10*3/MM3 (ref 0–0.2)
BASOPHILS NFR BLD AUTO: 0.8 % (ref 0–1.5)
BILIRUB SERPL-MCNC: 0.2 MG/DL (ref 0.2–1.2)
BUN BLD-MCNC: 19 MG/DL (ref 8–23)
BUN BLD-MCNC: 19 MG/DL (ref 8–23)
BUN/CREAT SERPL: 20.4 (ref 7–25)
BUN/CREAT SERPL: 22.1 (ref 7–25)
CALCIUM SPEC-SCNC: 8.9 MG/DL (ref 8.6–10.5)
CALCIUM SPEC-SCNC: 9.5 MG/DL (ref 8.6–10.5)
CHLORIDE SERPL-SCNC: 103 MMOL/L (ref 98–107)
CHLORIDE SERPL-SCNC: 104 MMOL/L (ref 98–107)
CO2 SERPL-SCNC: 24.2 MMOL/L (ref 22–29)
CO2 SERPL-SCNC: 25.9 MMOL/L (ref 22–29)
CREAT BLD-MCNC: 0.86 MG/DL (ref 0.76–1.27)
CREAT BLD-MCNC: 0.93 MG/DL (ref 0.76–1.27)
CRP SERPL-MCNC: 1.07 MG/DL (ref 0–0.5)
D-LACTATE SERPL-SCNC: 1.7 MMOL/L (ref 0.5–2)
DEPRECATED RDW RBC AUTO: 40.4 FL (ref 37–54)
EOSINOPHIL # BLD AUTO: 0.1 10*3/MM3 (ref 0–0.4)
EOSINOPHIL NFR BLD AUTO: 2 % (ref 0.3–6.2)
ERYTHROCYTE [DISTWIDTH] IN BLOOD BY AUTOMATED COUNT: 11.9 % (ref 12.3–15.4)
ERYTHROCYTE [SEDIMENTATION RATE] IN BLOOD: 60 MM/HR (ref 0–20)
GFR SERPL CREATININE-BSD FRML MDRD: 83 ML/MIN/1.73
GFR SERPL CREATININE-BSD FRML MDRD: 91 ML/MIN/1.73
GLOBULIN UR ELPH-MCNC: 4.3 GM/DL
GLUCOSE BLD-MCNC: 131 MG/DL (ref 65–99)
GLUCOSE BLD-MCNC: 178 MG/DL (ref 65–99)
GLUCOSE BLDC GLUCOMTR-MCNC: 106 MG/DL (ref 70–130)
GLUCOSE BLDC GLUCOMTR-MCNC: 118 MG/DL (ref 70–130)
GLUCOSE BLDC GLUCOMTR-MCNC: 124 MG/DL (ref 70–130)
GLUCOSE BLDC GLUCOMTR-MCNC: 124 MG/DL (ref 70–130)
GLUCOSE BLDC GLUCOMTR-MCNC: 160 MG/DL (ref 70–130)
HCT VFR BLD AUTO: 41.6 % (ref 37.5–51)
HGB BLD-MCNC: 13.4 G/DL (ref 13–17.7)
IMM GRANULOCYTES # BLD AUTO: 0.03 10*3/MM3 (ref 0–0.05)
IMM GRANULOCYTES NFR BLD AUTO: 0.6 % (ref 0–0.5)
LYMPHOCYTES # BLD AUTO: 1.47 10*3/MM3 (ref 0.7–3.1)
LYMPHOCYTES NFR BLD AUTO: 28.8 % (ref 19.6–45.3)
MCH RBC QN AUTO: 29.6 PG (ref 26.6–33)
MCHC RBC AUTO-ENTMCNC: 32.2 G/DL (ref 31.5–35.7)
MCV RBC AUTO: 92 FL (ref 79–97)
MONOCYTES # BLD AUTO: 0.45 10*3/MM3 (ref 0.1–0.9)
MONOCYTES NFR BLD AUTO: 8.8 % (ref 5–12)
NEUTROPHILS # BLD AUTO: 3.02 10*3/MM3 (ref 1.7–7)
NEUTROPHILS NFR BLD AUTO: 59 % (ref 42.7–76)
NRBC BLD AUTO-RTO: 0 /100 WBC (ref 0–0.2)
PLATELET # BLD AUTO: 496 10*3/MM3 (ref 140–450)
PMV BLD AUTO: 9.2 FL (ref 6–12)
POTASSIUM BLD-SCNC: 4.6 MMOL/L (ref 3.5–5.2)
POTASSIUM BLD-SCNC: 4.7 MMOL/L (ref 3.5–5.2)
PROT SERPL-MCNC: 7.4 G/DL (ref 6–8.5)
RBC # BLD AUTO: 4.52 10*6/MM3 (ref 4.14–5.8)
SODIUM BLD-SCNC: 137 MMOL/L (ref 136–145)
SODIUM BLD-SCNC: 137 MMOL/L (ref 136–145)
T4 FREE SERPL-MCNC: 1.15 NG/DL (ref 0.93–1.7)
TSH SERPL DL<=0.05 MIU/L-ACNC: 2.96 UIU/ML (ref 0.27–4.2)
WBC NRBC COR # BLD: 5.11 10*3/MM3 (ref 3.4–10.8)

## 2020-06-16 PROCEDURE — 85025 COMPLETE CBC W/AUTO DIFF WBC: CPT | Performed by: PODIATRIST

## 2020-06-16 PROCEDURE — 25010000002 VANCOMYCIN: Performed by: INTERNAL MEDICINE

## 2020-06-16 PROCEDURE — 25010000002 PROPOFOL 10 MG/ML EMULSION: Performed by: NURSE ANESTHETIST, CERTIFIED REGISTERED

## 2020-06-16 PROCEDURE — 99223 1ST HOSP IP/OBS HIGH 75: CPT | Performed by: PHYSICIAN ASSISTANT

## 2020-06-16 PROCEDURE — 82962 GLUCOSE BLOOD TEST: CPT

## 2020-06-16 PROCEDURE — 93010 ELECTROCARDIOGRAM REPORT: CPT | Performed by: INTERNAL MEDICINE

## 2020-06-16 PROCEDURE — 87205 SMEAR GRAM STAIN: CPT | Performed by: PODIATRIST

## 2020-06-16 PROCEDURE — 84439 ASSAY OF FREE THYROXINE: CPT | Performed by: PHYSICIAN ASSISTANT

## 2020-06-16 PROCEDURE — 0J9Q3ZZ DRAINAGE OF RIGHT FOOT SUBCUTANEOUS TISSUE AND FASCIA, PERCUTANEOUS APPROACH: ICD-10-PCS | Performed by: PODIATRIST

## 2020-06-16 PROCEDURE — 63710000001 INSULIN ASPART PER 5 UNITS: Performed by: INTERNAL MEDICINE

## 2020-06-16 PROCEDURE — 25010000002 CEFEPIME PER 500 MG: Performed by: INTERNAL MEDICINE

## 2020-06-16 PROCEDURE — 80053 COMPREHEN METABOLIC PANEL: CPT | Performed by: INTERNAL MEDICINE

## 2020-06-16 PROCEDURE — 87040 BLOOD CULTURE FOR BACTERIA: CPT | Performed by: PODIATRIST

## 2020-06-16 PROCEDURE — 87070 CULTURE OTHR SPECIMN AEROBIC: CPT | Performed by: PODIATRIST

## 2020-06-16 PROCEDURE — 25010000002 KETOROLAC TROMETHAMINE PER 15 MG: Performed by: NURSE ANESTHETIST, CERTIFIED REGISTERED

## 2020-06-16 PROCEDURE — 83605 ASSAY OF LACTIC ACID: CPT | Performed by: INTERNAL MEDICINE

## 2020-06-16 PROCEDURE — 87070 CULTURE OTHR SPECIMN AEROBIC: CPT | Performed by: INTERNAL MEDICINE

## 2020-06-16 PROCEDURE — 25010000002 ONDANSETRON PER 1 MG: Performed by: NURSE ANESTHETIST, CERTIFIED REGISTERED

## 2020-06-16 PROCEDURE — 87205 SMEAR GRAM STAIN: CPT | Performed by: INTERNAL MEDICINE

## 2020-06-16 PROCEDURE — 94799 UNLISTED PULMONARY SVC/PX: CPT

## 2020-06-16 PROCEDURE — 25010000002 MIDAZOLAM PER 1 MG: Performed by: NURSE ANESTHETIST, CERTIFIED REGISTERED

## 2020-06-16 PROCEDURE — 80048 BASIC METABOLIC PNL TOTAL CA: CPT | Performed by: PODIATRIST

## 2020-06-16 PROCEDURE — 25010000002 FENTANYL CITRATE (PF) 100 MCG/2ML SOLUTION: Performed by: NURSE ANESTHETIST, CERTIFIED REGISTERED

## 2020-06-16 PROCEDURE — 25010000002 HEPARIN (PORCINE) PER 1000 UNITS: Performed by: INTERNAL MEDICINE

## 2020-06-16 PROCEDURE — 86140 C-REACTIVE PROTEIN: CPT | Performed by: PODIATRIST

## 2020-06-16 PROCEDURE — 85652 RBC SED RATE AUTOMATED: CPT | Performed by: PODIATRIST

## 2020-06-16 PROCEDURE — 84443 ASSAY THYROID STIM HORMONE: CPT | Performed by: PHYSICIAN ASSISTANT

## 2020-06-16 PROCEDURE — 93005 ELECTROCARDIOGRAM TRACING: CPT | Performed by: PHYSICIAN ASSISTANT

## 2020-06-16 PROCEDURE — 25010000002 VANCOMYCIN 1 G RECONSTITUTED SOLUTION: Performed by: PODIATRIST

## 2020-06-16 RX ORDER — OXYCODONE HYDROCHLORIDE AND ACETAMINOPHEN 5; 325 MG/1; MG/1
1 TABLET ORAL ONCE AS NEEDED
Status: DISCONTINUED | OUTPATIENT
Start: 2020-06-16 | End: 2020-06-16 | Stop reason: HOSPADM

## 2020-06-16 RX ORDER — CEFAZOLIN SODIUM 2 G/50ML
2 SOLUTION INTRAVENOUS ONCE
Status: DISCONTINUED | OUTPATIENT
Start: 2020-06-16 | End: 2020-06-16

## 2020-06-16 RX ORDER — LIDOCAINE HYDROCHLORIDE 20 MG/ML
INJECTION, SOLUTION INFILTRATION; PERINEURAL AS NEEDED
Status: DISCONTINUED | OUTPATIENT
Start: 2020-06-16 | End: 2020-06-16 | Stop reason: SURG

## 2020-06-16 RX ORDER — NICOTINE POLACRILEX 4 MG
15 LOZENGE BUCCAL
Status: DISCONTINUED | OUTPATIENT
Start: 2020-06-16 | End: 2020-06-19 | Stop reason: HOSPADM

## 2020-06-16 RX ORDER — SODIUM CHLORIDE 0.9 % (FLUSH) 0.9 %
10 SYRINGE (ML) INJECTION AS NEEDED
Status: DISCONTINUED | OUTPATIENT
Start: 2020-06-16 | End: 2020-06-16 | Stop reason: HOSPADM

## 2020-06-16 RX ORDER — SODIUM CHLORIDE 0.9 % (FLUSH) 0.9 %
10 SYRINGE (ML) INJECTION AS NEEDED
Status: DISCONTINUED | OUTPATIENT
Start: 2020-06-16 | End: 2020-06-19 | Stop reason: HOSPADM

## 2020-06-16 RX ORDER — ACETAMINOPHEN 325 MG/1
650 TABLET ORAL EVERY 4 HOURS PRN
Status: DISCONTINUED | OUTPATIENT
Start: 2020-06-16 | End: 2020-06-19 | Stop reason: HOSPADM

## 2020-06-16 RX ORDER — HEPARIN SODIUM 5000 [USP'U]/ML
5000 INJECTION, SOLUTION INTRAVENOUS; SUBCUTANEOUS EVERY 8 HOURS SCHEDULED
Status: DISCONTINUED | OUTPATIENT
Start: 2020-06-16 | End: 2020-06-19 | Stop reason: HOSPADM

## 2020-06-16 RX ORDER — ONDANSETRON 2 MG/ML
4 INJECTION INTRAMUSCULAR; INTRAVENOUS AS NEEDED
Status: DISCONTINUED | OUTPATIENT
Start: 2020-06-16 | End: 2020-06-16 | Stop reason: HOSPADM

## 2020-06-16 RX ORDER — DEXTROSE MONOHYDRATE 25 G/50ML
25 INJECTION, SOLUTION INTRAVENOUS
Status: DISCONTINUED | OUTPATIENT
Start: 2020-06-16 | End: 2020-06-19 | Stop reason: HOSPADM

## 2020-06-16 RX ORDER — VANCOMYCIN HYDROCHLORIDE 1 G/20ML
INJECTION, POWDER, LYOPHILIZED, FOR SOLUTION INTRAVENOUS AS NEEDED
Status: DISCONTINUED | OUTPATIENT
Start: 2020-06-16 | End: 2020-06-16 | Stop reason: HOSPADM

## 2020-06-16 RX ORDER — ACETAMINOPHEN 160 MG/5ML
650 SOLUTION ORAL EVERY 4 HOURS PRN
Status: DISCONTINUED | OUTPATIENT
Start: 2020-06-16 | End: 2020-06-19 | Stop reason: HOSPADM

## 2020-06-16 RX ORDER — KETOROLAC TROMETHAMINE 30 MG/ML
INJECTION, SOLUTION INTRAMUSCULAR; INTRAVENOUS AS NEEDED
Status: DISCONTINUED | OUTPATIENT
Start: 2020-06-16 | End: 2020-06-16 | Stop reason: SURG

## 2020-06-16 RX ORDER — FENTANYL CITRATE 50 UG/ML
INJECTION, SOLUTION INTRAMUSCULAR; INTRAVENOUS AS NEEDED
Status: DISCONTINUED | OUTPATIENT
Start: 2020-06-16 | End: 2020-06-16 | Stop reason: SURG

## 2020-06-16 RX ORDER — MEPERIDINE HYDROCHLORIDE 25 MG/ML
12.5 INJECTION INTRAMUSCULAR; INTRAVENOUS; SUBCUTANEOUS
Status: DISCONTINUED | OUTPATIENT
Start: 2020-06-16 | End: 2020-06-16 | Stop reason: HOSPADM

## 2020-06-16 RX ORDER — ONDANSETRON 2 MG/ML
INJECTION INTRAMUSCULAR; INTRAVENOUS AS NEEDED
Status: DISCONTINUED | OUTPATIENT
Start: 2020-06-16 | End: 2020-06-16 | Stop reason: SURG

## 2020-06-16 RX ORDER — FAMOTIDINE 10 MG/ML
INJECTION, SOLUTION INTRAVENOUS AS NEEDED
Status: DISCONTINUED | OUTPATIENT
Start: 2020-06-16 | End: 2020-06-16 | Stop reason: SURG

## 2020-06-16 RX ORDER — SODIUM CHLORIDE 0.9 % (FLUSH) 0.9 %
10 SYRINGE (ML) INJECTION EVERY 12 HOURS SCHEDULED
Status: DISCONTINUED | OUTPATIENT
Start: 2020-06-16 | End: 2020-06-19 | Stop reason: HOSPADM

## 2020-06-16 RX ORDER — MIDAZOLAM HYDROCHLORIDE 1 MG/ML
INJECTION INTRAMUSCULAR; INTRAVENOUS AS NEEDED
Status: DISCONTINUED | OUTPATIENT
Start: 2020-06-16 | End: 2020-06-16 | Stop reason: SURG

## 2020-06-16 RX ORDER — ACETAMINOPHEN 650 MG/1
650 SUPPOSITORY RECTAL EVERY 4 HOURS PRN
Status: DISCONTINUED | OUTPATIENT
Start: 2020-06-16 | End: 2020-06-19 | Stop reason: HOSPADM

## 2020-06-16 RX ORDER — BUPIVACAINE HYDROCHLORIDE 5 MG/ML
INJECTION, SOLUTION PERINEURAL AS NEEDED
Status: DISCONTINUED | OUTPATIENT
Start: 2020-06-16 | End: 2020-06-16 | Stop reason: HOSPADM

## 2020-06-16 RX ORDER — SODIUM CHLORIDE, SODIUM LACTATE, POTASSIUM CHLORIDE, CALCIUM CHLORIDE 600; 310; 30; 20 MG/100ML; MG/100ML; MG/100ML; MG/100ML
125 INJECTION, SOLUTION INTRAVENOUS CONTINUOUS
Status: DISCONTINUED | OUTPATIENT
Start: 2020-06-16 | End: 2020-06-17

## 2020-06-16 RX ORDER — FENTANYL CITRATE 50 UG/ML
50 INJECTION, SOLUTION INTRAMUSCULAR; INTRAVENOUS
Status: DISCONTINUED | OUTPATIENT
Start: 2020-06-16 | End: 2020-06-16 | Stop reason: HOSPADM

## 2020-06-16 RX ORDER — PROPOFOL 10 MG/ML
VIAL (ML) INTRAVENOUS AS NEEDED
Status: DISCONTINUED | OUTPATIENT
Start: 2020-06-16 | End: 2020-06-16 | Stop reason: SURG

## 2020-06-16 RX ORDER — IPRATROPIUM BROMIDE AND ALBUTEROL SULFATE 2.5; .5 MG/3ML; MG/3ML
3 SOLUTION RESPIRATORY (INHALATION) ONCE AS NEEDED
Status: DISCONTINUED | OUTPATIENT
Start: 2020-06-16 | End: 2020-06-16 | Stop reason: HOSPADM

## 2020-06-16 RX ORDER — SODIUM CHLORIDE 0.9 % (FLUSH) 0.9 %
10 SYRINGE (ML) INJECTION EVERY 12 HOURS SCHEDULED
Status: DISCONTINUED | OUTPATIENT
Start: 2020-06-16 | End: 2020-06-16 | Stop reason: HOSPADM

## 2020-06-16 RX ADMIN — METRONIDAZOLE 500 MG: 500 INJECTION, SOLUTION INTRAVENOUS at 21:00

## 2020-06-16 RX ADMIN — MIDAZOLAM HYDROCHLORIDE 2 MG: 1 INJECTION, SOLUTION INTRAMUSCULAR; INTRAVENOUS at 10:50

## 2020-06-16 RX ADMIN — SODIUM CHLORIDE, POTASSIUM CHLORIDE, SODIUM LACTATE AND CALCIUM CHLORIDE 125 ML/HR: 600; 310; 30; 20 INJECTION, SOLUTION INTRAVENOUS at 18:26

## 2020-06-16 RX ADMIN — METRONIDAZOLE 500 MG: 500 INJECTION, SOLUTION INTRAVENOUS at 15:40

## 2020-06-16 RX ADMIN — ONDANSETRON 4 MG: 2 INJECTION INTRAMUSCULAR; INTRAVENOUS at 10:50

## 2020-06-16 RX ADMIN — HEPARIN SODIUM 5000 UNITS: 5000 INJECTION INTRAVENOUS; SUBCUTANEOUS at 22:49

## 2020-06-16 RX ADMIN — KETOROLAC TROMETHAMINE 30 MG: 30 INJECTION, SOLUTION INTRAMUSCULAR; INTRAVENOUS at 10:59

## 2020-06-16 RX ADMIN — CEFEPIME 2 G: 2 INJECTION, POWDER, FOR SOLUTION INTRAVENOUS at 22:49

## 2020-06-16 RX ADMIN — VANCOMYCIN HYDROCHLORIDE 1000 MG: 1 INJECTION, POWDER, LYOPHILIZED, FOR SOLUTION INTRAVENOUS at 10:08

## 2020-06-16 RX ADMIN — FENTANYL CITRATE 50 MCG: 50 INJECTION INTRAMUSCULAR; INTRAVENOUS at 10:50

## 2020-06-16 RX ADMIN — HEPARIN SODIUM 5000 UNITS: 5000 INJECTION INTRAVENOUS; SUBCUTANEOUS at 14:25

## 2020-06-16 RX ADMIN — FAMOTIDINE 20 MG: 10 INJECTION INTRAVENOUS at 10:50

## 2020-06-16 RX ADMIN — CEFEPIME 2 G: 2 INJECTION, POWDER, FOR SOLUTION INTRAVENOUS at 14:25

## 2020-06-16 RX ADMIN — VANCOMYCIN HYDROCHLORIDE 1250 MG: 10 INJECTION, POWDER, LYOPHILIZED, FOR SOLUTION INTRAVENOUS at 22:00

## 2020-06-16 RX ADMIN — LIDOCAINE HYDROCHLORIDE 40 MG: 20 INJECTION, SOLUTION INFILTRATION; PERINEURAL at 10:57

## 2020-06-16 RX ADMIN — PROPOFOL 130 MG: 10 INJECTION, EMULSION INTRAVENOUS at 10:57

## 2020-06-16 RX ADMIN — VANCOMYCIN HYDROCHLORIDE 1 G: 1 INJECTION, POWDER, LYOPHILIZED, FOR SOLUTION INTRAVENOUS at 10:50

## 2020-06-16 RX ADMIN — INSULIN ASPART 2 UNITS: 100 INJECTION, SOLUTION INTRAVENOUS; SUBCUTANEOUS at 17:28

## 2020-06-16 RX ADMIN — SODIUM CHLORIDE, POTASSIUM CHLORIDE, SODIUM LACTATE AND CALCIUM CHLORIDE 125 ML/HR: 600; 310; 30; 20 INJECTION, SOLUTION INTRAVENOUS at 09:15

## 2020-06-16 RX ADMIN — FENTANYL CITRATE 50 MCG: 50 INJECTION INTRAMUSCULAR; INTRAVENOUS at 11:26

## 2020-06-16 NOTE — ANESTHESIA PREPROCEDURE EVALUATION
Anesthesia Evaluation                  Airway   Mallampati: II  TM distance: >3 FB  Neck ROM: full  No difficulty expected  Dental - normal exam     Pulmonary - normal exam   Cardiovascular - normal exam        Neuro/Psych  GI/Hepatic/Renal/Endo    (+)   diabetes mellitus,     Musculoskeletal     Abdominal  - normal exam    Bowel sounds: normal.   Substance History      OB/GYN          Other   arthritis,                      Anesthesia Plan    ASA 2     general     intravenous induction     Anesthetic plan, all risks, benefits, and alternatives have been provided, discussed and informed consent has been obtained with: patient.

## 2020-06-16 NOTE — ANESTHESIA POSTPROCEDURE EVALUATION
Patient: Davian Newell    Procedure Summary     Date:  06/16/20 Room / Location:  [facility] OR 01 /  [facility] OR    Anesthesia Start:  1050 Anesthesia Stop:  1130    Procedure:  INCISION AND DRAINAGE LOWER EXTREMITY (Right ) Diagnosis:       Abscess of right foot      (Abscess of right foot [L02.611])    Surgeon:  Daisy Gerard DPM Provider:  Ravi Dumont DO    Anesthesia Type:  general ASA Status:  2          Anesthesia Type: general    Vitals  Vitals Value Taken Time   /69 6/16/2020 12:01 PM   Temp 97.3 °F (36.3 °C) 6/16/2020 12:01 PM   Pulse 47 6/16/2020 12:01 PM   Resp 11 6/16/2020 12:01 PM   SpO2 100 % 6/16/2020 12:01 PM           Post Anesthesia Care and Evaluation    Patient location during evaluation: PHASE II  Patient participation: complete - patient participated  Level of consciousness: awake and alert  Pain score: 1  Pain management: adequate  Airway patency: patent  Anesthetic complications: No anesthetic complications  PONV Status: controlled  Cardiovascular status: acceptable  Respiratory status: acceptable  Hydration status: acceptable

## 2020-06-16 NOTE — H&P
PODIATRIC SURGERY  History and Physical      Principal problem: Abscess of right foot    Subjective .     History of present illness:    Mr. Davian Newell is a 60 y.o. years old male with right foot infection. He was recently diagnosed with diabetes, HbA1C 9.8%, and started on metformin about 2 weeks ago. About three weeks ago he developed a callus on the lateral aspect of his right foot, he ripped it off and experienced progressing redness, swelling, warmth, and pain. He was admitted for IV antibiotics from 6/5 to 6/7 for treatment of cellulitis. A CT right foot during that admission was negative for osteomyelitis. He was discharged with one week PO Augmentin and Doxycycline, which he did complete. Upon outpatient follow up, purulent drainage with suspected abscess in the right foot was observed, he was then scheduled for incision and drainage of the right foot. An admission following surgery is planned for IV antibiotics, bone scan to rule out osteomyelitis, and possible infectious disease consult.     History taken from: patient    Case was discussed with patient    Review of Systems    Constitutional: no fever, chills and night sweats. No appetite change or unexpected weight change. No fatigue.  Eyes: no eye drainage, itching or redness.  HEENT: no mouth sores, dysphagia or nose bleed.  Respiratory: no for shortness of breath, cough or production of sputum.  Cardiovascular: no chest pain, no palpitations, no orthopnea.  Gastrointestinal: no nausea, vomiting or diarrhea. No abdominal pain, hematemesis or rectal bleeding.  Genitourinary: no dysuria or polyuria.  Hematologic/lymphatic: no lymph node abnormalities, no easy bruising or easy bleeding.  Musculoskeletal: no muscle or joint pain.  Skin: No rash and no itching.  Neurological: no loss of consciousness, no seizure, no headache.  Behavioral/Psych: no depression or suicidal ideation.  Endocrine: no hot flashes.  Immunologic: negative.    Past Medical  "History    Past Medical History:   Diagnosis Date   • Arthritis    • Diabetes mellitus (CMS/HCC)    • Heartburn        Past Surgical History    Past Surgical History:   Procedure Laterality Date   • ANKLE ARTHROSCOPY     • COLONOSCOPY     • FRACTURE SURGERY         Family History    History reviewed. No pertinent family history.    Social History    Social History     Tobacco Use   • Smoking status: Never Smoker   • Smokeless tobacco: Never Used   Substance Use Topics   • Alcohol use: Yes   • Drug use: No       Allergies    Patient has no known allergies.    Objective     /71 (BP Location: Left arm, Patient Position: Lying)   Pulse 61   Temp 97.8 °F (36.6 °C) (Oral)   Resp 20   Ht 177.8 cm (70\")   Wt 76.7 kg (169 lb 2 oz)   SpO2 99%   BMI 24.27 kg/m²     Temp:  [97.8 °F (36.6 °C)] 97.8 °F (36.6 °C)      No intake or output data in the 24 hours ending 06/16/20 0923      Physical Exam:     General Appearance:    Alert, cooperative, in no acute distress   Head:    Normocephalic, without obvious abnormality, atraumatic    Heart:    Regular rhythm and normal rate     Chest Wall:    No abnormalities observed   Abdomen:    Soft non-tender, non-distended, no guarding, no rebound                tenderness   Extremities:   Moves all extremities well, no cyanosis. Right plantar lateral foot multiple ulcerations with purulent drainage. Cellulitis extending to ankle.   Pulses:   Pulses palpable and equal bilaterally   Skin:   No bleeding, bruising or rash   Lymph nodes:   No palpable adenopathy                   Lab Results   Component Value Date    NEUTROABS 6.04 06/07/2020                   Imaging Results (Last 24 Hours)     ** No results found for the last 24 hours. **            Results Review:    I have personally reviewed laboratory data, culture results, radiology studies and antimicrobial therapy.    Hospital Medications (active)       Dose Frequency Start End    lactated ringers infusion 125 mL/hr " Continuous 6/16/2020     Route: Intravenous    sodium chloride 0.9 % flush 10 mL 10 mL Every 12 Hours Scheduled 6/16/2020     Route: Intravenous    sodium chloride 0.9 % flush 10 mL 10 mL As Needed 6/16/2020     Route: Intravenous    vancomycin 1 g/250 mL 0.9% NS (vial-mate) 1,000 mg Once 6/16/2020     Route: Intravenous            PROBLEM LIST:    Patient Active Problem List   Diagnosis   • Cellulitis of foot, right   • Hyperglycemia   • Abscess of right foot       Assessment/Plan     ASSESSMENT:    60 year old male with right foot ulcerations and abscess    PLAN:    -Proceeding with incision and drainage after thorough discussion of all alternatives, risks, and benefits.      Patients findings and recommendations were discussed with patient    Code Status:   There are no questions and answers to display.       Daisy Gerard DPM  06/16/20  09:23

## 2020-06-16 NOTE — OP NOTE
Davian Newell  6/16/2020      Operative Progress Note:    Surgeon and Assistant: Dr. Daisy Gerard DPM    Pre-Operative Diagnosis:   1.  Right foot abscess and cellulitis  2.  Right foot diabetic ulceration    Post-Operative Diagnosis:   1.  Right foot abscess and cellulitis  2.  Right foot diabetic ulceration    Procedure(s):  Right foot incision and drainage below fascia    Type of Anesthesia Administered: General with 10mL ankle block 0.5% marcaine    Estimated Blood Loss: 2mL    Hemostasis: ankle tourniquet inflated to 250mmHg    Blood Products: None    Specimen Obtained/Removed: Swab culture sent to pathology    Complication(s):  None    Graft/Implant/Prosthetics/Implanted Device/Transplants: None    Indication: 60 year old male with non-healing diabetic ulceration lateral right foot. He has had recurrent signs of retained fluid and abscess after both IV and oral antibiotics and bedside irrigation. He presents today for surgical incision and drainage of the right foot after thorough discussion of all alternatives, risks and benefits.    Findings: Right foot multiple plantar lateral 5th metatarsal ulcerations, largest at the metatarsal head 1.5cm x 1cm. Purulent drainage tracking to the plantar midfoot. No signs of bone necrosis.    Operative Report:  Patient was identified in the preoperative holding area formal consent was signed and the right lower extremity was marked as correct site.  Patient was brought to the operating suite and placed in the operating table in the supine position.  Timeout was performed he then he underwent general anesthesia.  A well-padded ankle tourniquet was applied to the right.  The right lower extremity was then scrubbed prepped and draped in the usual sterile manner.  Attention was then focused to the plantar lateral fifth metatarsal but there is noted to be multiple ulcerations as well as cellulitis extending both dorsally and plantarly to the ankle.  The foot was elevated and the  tourniquet was inflated.  Approximately 4 cm incision was made at the plantar lateral aspect of the fifth metatarsal using a 15 blade.  Hemostats were used to explore the wound there is noted to be no tracking on the dorsal aspect of the foot but there was 5 to 6 cm tracking to the plantar midfoot with approximately 2 mL of purulent fluid expressed from this area.  There were no signs of bone necrosis.  Swab culture was taken of the plantar foot.  The surgical site was then copiously irrigated with a mixture of sterile saline, bacitracin, polymyxin.  Vancomycin powder was then applied to the surgical site.  2-0 nylon retention sutures were placed.  And the incision was packed with Betadine soaked quarter inch packing.  Ankle block was performed at this time.  Dry sterile dressing was applied, the tourniquet was deflated, there was noted to be brisk capillary refill to all digits.  The patient was extubated and transferred to PACU with all vital signs stable. Patient will be admitted under the care of the hospitalist for continuation of IV antibiotics as well as bone scan to evaluate for osteomyelitis.      Electronically Signed by: Daisy Gerard DPM        Dictated Utilizing Dragon Dictation

## 2020-06-16 NOTE — H&P
[facility] Medicine Services  History & Physical    Patient Identification:  Name:  Davian Newell  Age:  60 y.o.  Sex:  male  :  1960  MRN:  [id number]   Visit Number:  [id number]  Primary Care Physician:  Provider, No Known    Subjective     Chief complaint: Cellulitis and possible osteomyelitis    History of presenting illness:      Davian Newell is a 60 y.o. male with past medical history significant for diabetes mellitus, heartburn, arthritis.  He initially presented to this facility today for outpatient surgery with Dr. Daisy Gerard.  He was recently hospitalized at this facility from  through 2020 with right foot cellulitis and hyperglycemia with newly diagnosed diabetes mellitus type 2.  He was discharged with Augmentin, doxycycline, lactobacillus, and started on metformin in addition to being provided with a glucometer device kit.  Initial CT of the right foot during said admission was negative for known evidence of osteomyelitis.  He did complete outpatient Augmentin and doxycycline.  He followed up with  on 2020 and had purulent drainage with suspected abscess in the right foot observed.  He was then scheduled for incision and drainage of the right foot on 2020.  He presented to this facility on this date for this outpatient procedure.  It was felt that he would benefit from admission following this procedure for IV antibiotics and a bone scan to rule out underlying osteomyelitis.      Upon arrival to the outpatient surgery worsen of this facility, his vital signs were temperature 97.8 °F, pulse 61, respiration rate 20, and blood pressure 137/71.  Labs revealed white blood cell count of 5.11 with C-reactive protein of 1.07. Blood cultures were performed and pending and wound culture was performed as well.      Mr. Newell was evaluated by Jayleen Knutson PA-C in room 343B post-operatively. He reports he completed oral antibiotics and  started Metformin.  He reports some mild diarrhea with both.  He denies chest pain, dyspnea, cough, and wheeze.  He reports worsening right foot redness and drainage prior to presentation. I am unable to see this during my exam as his right foot has just been dressed post-op.      ---------------------------------------------------------------------------------------------------------------------   Review of Systems   Constitutional: Negative for chills, fatigue and fever.   HENT: Negative for congestion and drooling.    Eyes: Negative for pain and discharge.   Respiratory: Negative for cough, chest tightness and shortness of breath.    Cardiovascular: Negative for chest pain and leg swelling.   Gastrointestinal: Negative for abdominal distention, diarrhea and vomiting.   Endocrine: Negative for cold intolerance and heat intolerance.   Genitourinary: Negative for difficulty urinating and dysuria.   Musculoskeletal: Negative for arthralgias and back pain.   Skin: Positive for color change and wound.   Allergic/Immunologic: Negative for environmental allergies and food allergies.   Neurological: Negative for dizziness and headaches.   Psychiatric/Behavioral: Negative for agitation and confusion.        ---------------------------------------------------------------------------------------------------------------------   Past Medical History:   Diagnosis Date   • Arthritis    • Diabetes mellitus (CMS/[facility])    • Heartburn      Past Surgical History:   Procedure Laterality Date   • ANKLE ARTHROSCOPY     • COLONOSCOPY     • FRACTURE SURGERY       History reviewed. No pertinent family history.  Social History     Socioeconomic History   • Marital status: Single     Spouse name: Not on file   • Number of children: Not on file   • Years of education: Not on file   • Highest education level: Not on file   Tobacco Use   • Smoking status: Never Smoker   • Smokeless tobacco: Never Used   Substance and Sexual Activity   • Alcohol  use: Yes   • Drug use: No   • Sexual activity: Defer     ---------------------------------------------------------------------------------------------------------------------   Allergies:  Patient has no known allergies.  ---------------------------------------------------------------------------------------------------------------------   Home medications:    Medications below are reported home medications pulling from within the system; at this time, these medications have not been reconciled unless otherwise specified and are in the verification process for further verifcation as current home medications.  Medications Prior to Admission   Medication Sig Dispense Refill Last Dose   • metFORMIN (GLUCOPHAGE) 500 MG tablet Take 1 tablet by mouth 2 (Two) Times a Day With Meals. 60 tablet 0 6/16/2020 at 0800       [facility] Scheduled Meds:    cefepime 2 g Intravenous Once   cefepime 2 g Intravenous Q8H   heparin (porcine) 5,000 Units Subcutaneous Q8H   insulin aspart 0-9 Units Subcutaneous TID AC   metroNIDAZOLE 500 mg Intravenous Q8H   sodium chloride 10 mL Intravenous Q12H       lactated ringers 125 mL/hr Last Rate: Stopped (06/16/20 1116)   Pharmacy to dose vancomycin         Current listed hospital scheduled medications may not yet reflect those currently placed in orders that are signed and held awaiting patient's arrival to floor.   ---------------------------------------------------------------------------------------------------------------------     Objective     Vital Signs:  Temp:  [97.3 °F (36.3 °C)-97.8 °F (36.6 °C)] 97.7 °F (36.5 °C)  Heart Rate:  [47-61] 50  Resp:  [8-20] 18  BP: (100-137)/(65-73) 125/71      06/16/20  0900   Weight: 76.7 kg (169 lb 2 oz)     Body mass index is 24.27 kg/m².  ---------------------------------------------------------------------------------------------------------------------       Physical Exam   Constitutional: He is oriented to person, place, and time. He appears  well-developed and well-nourished.   HENT:   Head: Normocephalic and atraumatic.   Eyes: Pupils are equal, round, and reactive to light. EOM are normal.   Neck: Normal range of motion. Neck supple.   Cardiovascular: Normal rate and regular rhythm. Exam reveals no friction rub.   No murmur heard.  Pulmonary/Chest: Effort normal and breath sounds normal. No respiratory distress.   Abdominal: Soft. Bowel sounds are normal.   Musculoskeletal:   RLE wrapped post-operatively   Neurological: He is alert and oriented to person, place, and time.   Skin: Skin is warm and dry.   Psychiatric: He has a normal mood and affect. His behavior is normal.   Nursing note and vitals reviewed.            ---------------------------------------------------------------------------------------------------------------------  EKG:      EKG added & cardiac monitoring added given low pulse rates in the 40s          ---------------------------------------------------------------------------------------------------------------------   Results from last 7 days   Lab Units 06/16/20  0934   CRP mg/dL 1.07*   WBC 10*3/mm3 5.11   HEMOGLOBIN g/dL 13.4   HEMATOCRIT % 41.6   MCV fL 92.0   MCHC g/dL 32.2   PLATELETS 10*3/mm3 496*         Results from last 7 days   Lab Units 06/16/20  0934   SODIUM mmol/L 137   POTASSIUM mmol/L 4.6   CHLORIDE mmol/L 103   CO2 mmol/L 25.9   BUN mg/dL 19   CREATININE mg/dL 0.86   EGFR IF NONAFRICN AM mL/min/1.73 91   CALCIUM mg/dL 9.5   GLUCOSE mg/dL 131*   Estimated Creatinine Clearance: 99.1 mL/min (by C-G formula based on SCr of 0.86 mg/dL).  No results found for: AMMONIA          Lab Results   Component Value Date    HGBA1C 9.80 (H) 06/05/2020     Lab Results   Component Value Date    TSH 2.960 06/16/2020    FREET4 1.15 06/16/2020     No results found for: PREGTESTUR, PREGSERUM, HCG, HCGQUANT  Pain Management Panel     There is no flowsheet data to display.        No results found for: BLOODCX  No results found for:  URINECX  No results found for: WOUNDCX  No results found for: STOOLCX      ---------------------------------------------------------------------------------------------------------------------  Imaging Results (Last 7 Days)     ** No results found for the last 168 hours. **          Cultures:  No results found for: BLOODCX, URINECX, WOUNDCX, MRSACX, RESPCX, STOOLCX    Last echocardiogram:               I have personally reviewed the above radiology images and read the final radiology report on 06/16/20  ---------------------------------------------------------------------------------------------------------------------  Assessment / Plan     Active Hospital Problems    Diagnosis POA   • **Abscess of right foot [L02.611] Unknown     Added automatically from request for surgery [id number]         ASSESSMENT/PLAN:  · Right foot cellulitis with abscess and possible osteomyelitis: Bone scan has been ordered. Blood cultures performed and wound culture performed. Will monitor daily CBC & C-RP. IV cefepime, flagyl, and vancomycin ordered for now. Wound culture from earlier this month reviewed and available within this document with growth of Streptococcus agalactiea.  NM bone scan has been ordered.     · Diabetes Mellitus type 2, NID: FSBG ACHS with SSI PRN ordered.  Hemoglobin a1c was 9.8 on 6/5/2020.     · Low heart rates with concern for sinus bradycardia: EKG added x1.  Cardiac monitoring added.   Will check TSH/free t4. Denies chest pain or dizziness.        ----------  -DVT prophylaxis: SQ Heparin  -Activity: Per podiatry  -Expected length of stay:   OBSERVATION status; however, if further evaluation or treatment plans warrant, status may change.  Based upon current information, I predict patient's care encounter to be less than or equal to 2 midnights          High risk secondary to right foot cellulitis with concern for limb threatening osteomyelitis in the setting of newly diagnosed diabetes mellitus type 2.    Jayleen  Paola Knutson PA-C  06/16/20  13:08  Pager # [phone]  ---------------------------------------------------------------------------------------------------------------------

## 2020-06-16 NOTE — ANESTHESIA PROCEDURE NOTES
Airway  Urgency: elective    Date/Time: 6/16/2020 10:57 AM  Airway not difficult    General Information and Staff    Patient location during procedure: OR  CRNA: Monica Daily CRNA    Indications and Patient Condition  Indications for airway management: airway protection    Preoxygenated: yes  MILS maintained throughout  Mask difficulty assessment: 0 - not attempted    Final Airway Details  Final airway type: supraglottic airway      Successful airway: unique  Size 4    Number of attempts at approach: 1  Assessment: lips, teeth, and gum same as pre-op

## 2020-06-16 NOTE — CONSULTS
Patient reports checking blood sugar at home 3-4 times per day since diagnosis.  He reports it running 130-140.  He says he has been watching what he eats.  No further questions at this time.

## 2020-06-16 NOTE — PROGRESS NOTES
Pharmacokinetics Service Note:    Pharmacy was consulted for vancomycin dosing for SSTI with a goal trough of 15-20 mg/L. Patient was given 1g x 1 at 1000 today. Based on demographics and renal function (CrCl=99.1 mL/min), patient will be started on vancomycin 1250 mg every 12 hours. Will continue to monitor renal function and will obtain a trough once steady state has be reached.     Thank you   Jan Guillen RPH

## 2020-06-17 ENCOUNTER — APPOINTMENT (OUTPATIENT)
Dept: ULTRASOUND IMAGING | Facility: HOSPITAL | Age: 60
End: 2020-06-17

## 2020-06-17 ENCOUNTER — APPOINTMENT (OUTPATIENT)
Dept: MRI IMAGING | Facility: HOSPITAL | Age: 60
End: 2020-06-17

## 2020-06-17 PROBLEM — E11.9 T2DM (TYPE 2 DIABETES MELLITUS) (HCC): Chronic | Status: ACTIVE | Noted: 2020-06-17

## 2020-06-17 PROBLEM — E88.09 HYPOALBUMINEMIA: Status: ACTIVE | Noted: 2020-06-17

## 2020-06-17 PROBLEM — K21.9 GERD (GASTROESOPHAGEAL REFLUX DISEASE): Chronic | Status: ACTIVE | Noted: 2020-06-17

## 2020-06-17 LAB
ANION GAP SERPL CALCULATED.3IONS-SCNC: 8.1 MMOL/L (ref 5–15)
BASOPHILS # BLD AUTO: 0.04 10*3/MM3 (ref 0–0.2)
BASOPHILS NFR BLD AUTO: 0.6 % (ref 0–1.5)
BUN BLD-MCNC: 15 MG/DL (ref 8–23)
BUN/CREAT SERPL: 18.5 (ref 7–25)
CALCIUM SPEC-SCNC: 8.6 MG/DL (ref 8.6–10.5)
CHLORIDE SERPL-SCNC: 106 MMOL/L (ref 98–107)
CO2 SERPL-SCNC: 21.9 MMOL/L (ref 22–29)
CREAT BLD-MCNC: 0.81 MG/DL (ref 0.76–1.27)
DEPRECATED RDW RBC AUTO: 40.9 FL (ref 37–54)
EOSINOPHIL # BLD AUTO: 0.1 10*3/MM3 (ref 0–0.4)
EOSINOPHIL NFR BLD AUTO: 1.5 % (ref 0.3–6.2)
ERYTHROCYTE [DISTWIDTH] IN BLOOD BY AUTOMATED COUNT: 11.9 % (ref 12.3–15.4)
GFR SERPL CREATININE-BSD FRML MDRD: 97 ML/MIN/1.73
GLUCOSE BLD-MCNC: 125 MG/DL (ref 65–99)
GLUCOSE BLDC GLUCOMTR-MCNC: 123 MG/DL (ref 70–130)
GLUCOSE BLDC GLUCOMTR-MCNC: 133 MG/DL (ref 70–130)
GLUCOSE BLDC GLUCOMTR-MCNC: 142 MG/DL (ref 70–130)
GLUCOSE BLDC GLUCOMTR-MCNC: 171 MG/DL (ref 70–130)
HCT VFR BLD AUTO: 37 % (ref 37.5–51)
HGB BLD-MCNC: 11.6 G/DL (ref 13–17.7)
IMM GRANULOCYTES # BLD AUTO: 0.01 10*3/MM3 (ref 0–0.05)
IMM GRANULOCYTES NFR BLD AUTO: 0.2 % (ref 0–0.5)
LYMPHOCYTES # BLD AUTO: 1.77 10*3/MM3 (ref 0.7–3.1)
LYMPHOCYTES NFR BLD AUTO: 26.6 % (ref 19.6–45.3)
MAGNESIUM SERPL-MCNC: 2.1 MG/DL (ref 1.6–2.4)
MCH RBC QN AUTO: 29.3 PG (ref 26.6–33)
MCHC RBC AUTO-ENTMCNC: 31.4 G/DL (ref 31.5–35.7)
MCV RBC AUTO: 93.4 FL (ref 79–97)
MONOCYTES # BLD AUTO: 0.62 10*3/MM3 (ref 0.1–0.9)
MONOCYTES NFR BLD AUTO: 9.3 % (ref 5–12)
NEUTROPHILS # BLD AUTO: 4.12 10*3/MM3 (ref 1.7–7)
NEUTROPHILS NFR BLD AUTO: 61.8 % (ref 42.7–76)
NRBC BLD AUTO-RTO: 0 /100 WBC (ref 0–0.2)
PHOSPHATE SERPL-MCNC: 3.2 MG/DL (ref 2.5–4.5)
PLATELET # BLD AUTO: 416 10*3/MM3 (ref 140–450)
PMV BLD AUTO: 9.3 FL (ref 6–12)
POTASSIUM BLD-SCNC: 4.2 MMOL/L (ref 3.5–5.2)
RBC # BLD AUTO: 3.96 10*6/MM3 (ref 4.14–5.8)
SODIUM BLD-SCNC: 136 MMOL/L (ref 136–145)
WBC NRBC COR # BLD: 6.66 10*3/MM3 (ref 3.4–10.8)

## 2020-06-17 PROCEDURE — A9577 INJ MULTIHANCE: HCPCS | Performed by: INTERNAL MEDICINE

## 2020-06-17 PROCEDURE — 93923 UPR/LXTR ART STDY 3+ LVLS: CPT | Performed by: RADIOLOGY

## 2020-06-17 PROCEDURE — 73719 MRI LOWER EXTREMITY W/DYE: CPT

## 2020-06-17 PROCEDURE — 93923 UPR/LXTR ART STDY 3+ LVLS: CPT

## 2020-06-17 PROCEDURE — 82962 GLUCOSE BLOOD TEST: CPT

## 2020-06-17 PROCEDURE — 25010000002 VANCOMYCIN 5 G RECONSTITUTED SOLUTION: Performed by: INTERNAL MEDICINE

## 2020-06-17 PROCEDURE — 0 GADOBENATE DIMEGLUMINE 529 MG/ML SOLUTION: Performed by: INTERNAL MEDICINE

## 2020-06-17 PROCEDURE — 84100 ASSAY OF PHOSPHORUS: CPT | Performed by: PHYSICIAN ASSISTANT

## 2020-06-17 PROCEDURE — 25010000002 CEFEPIME PER 500 MG: Performed by: INTERNAL MEDICINE

## 2020-06-17 PROCEDURE — 83735 ASSAY OF MAGNESIUM: CPT | Performed by: PHYSICIAN ASSISTANT

## 2020-06-17 PROCEDURE — 73719 MRI LOWER EXTREMITY W/DYE: CPT | Performed by: RADIOLOGY

## 2020-06-17 PROCEDURE — 85025 COMPLETE CBC W/AUTO DIFF WBC: CPT | Performed by: INTERNAL MEDICINE

## 2020-06-17 PROCEDURE — 25010000002 HEPARIN (PORCINE) PER 1000 UNITS: Performed by: INTERNAL MEDICINE

## 2020-06-17 PROCEDURE — 80048 BASIC METABOLIC PNL TOTAL CA: CPT | Performed by: INTERNAL MEDICINE

## 2020-06-17 PROCEDURE — 99233 SBSQ HOSP IP/OBS HIGH 50: CPT | Performed by: PHYSICIAN ASSISTANT

## 2020-06-17 RX ORDER — PANTOPRAZOLE SODIUM 40 MG/1
40 TABLET, DELAYED RELEASE ORAL
Status: DISCONTINUED | OUTPATIENT
Start: 2020-06-18 | End: 2020-06-19 | Stop reason: HOSPADM

## 2020-06-17 RX ORDER — L.ACID,PARA/B.BIFIDUM/S.THERM 8B CELL
1 CAPSULE ORAL DAILY
Status: DISCONTINUED | OUTPATIENT
Start: 2020-06-17 | End: 2020-06-19 | Stop reason: HOSPADM

## 2020-06-17 RX ORDER — NITROGLYCERIN 0.4 MG/1
0.4 TABLET SUBLINGUAL
Status: DISCONTINUED | OUTPATIENT
Start: 2020-06-17 | End: 2020-06-19 | Stop reason: HOSPADM

## 2020-06-17 RX ADMIN — HEPARIN SODIUM 5000 UNITS: 5000 INJECTION INTRAVENOUS; SUBCUTANEOUS at 14:32

## 2020-06-17 RX ADMIN — METRONIDAZOLE 500 MG: 500 INJECTION, SOLUTION INTRAVENOUS at 14:28

## 2020-06-17 RX ADMIN — VANCOMYCIN HYDROCHLORIDE 1250 MG: 10 INJECTION, POWDER, LYOPHILIZED, FOR SOLUTION INTRAVENOUS at 10:21

## 2020-06-17 RX ADMIN — HEPARIN SODIUM 5000 UNITS: 5000 INJECTION INTRAVENOUS; SUBCUTANEOUS at 06:10

## 2020-06-17 RX ADMIN — METRONIDAZOLE 500 MG: 500 INJECTION, SOLUTION INTRAVENOUS at 05:00

## 2020-06-17 RX ADMIN — VANCOMYCIN HYDROCHLORIDE 1250 MG: 10 INJECTION, POWDER, LYOPHILIZED, FOR SOLUTION INTRAVENOUS at 21:40

## 2020-06-17 RX ADMIN — SODIUM CHLORIDE, POTASSIUM CHLORIDE, SODIUM LACTATE AND CALCIUM CHLORIDE 125 ML/HR: 600; 310; 30; 20 INJECTION, SOLUTION INTRAVENOUS at 06:10

## 2020-06-17 RX ADMIN — HEPARIN SODIUM 5000 UNITS: 5000 INJECTION INTRAVENOUS; SUBCUTANEOUS at 21:40

## 2020-06-17 RX ADMIN — CEFEPIME 2 G: 2 INJECTION, POWDER, FOR SOLUTION INTRAVENOUS at 06:10

## 2020-06-17 RX ADMIN — SODIUM CHLORIDE, PRESERVATIVE FREE 10 ML: 5 INJECTION INTRAVENOUS at 21:40

## 2020-06-17 RX ADMIN — CEFEPIME 2 G: 2 INJECTION, POWDER, FOR SOLUTION INTRAVENOUS at 22:45

## 2020-06-17 RX ADMIN — SODIUM CHLORIDE, PRESERVATIVE FREE 10 ML: 5 INJECTION INTRAVENOUS at 10:24

## 2020-06-17 RX ADMIN — GADOBENATE DIMEGLUMINE 15 ML: 529 INJECTION, SOLUTION INTRAVENOUS at 17:45

## 2020-06-17 RX ADMIN — Medication 1 CAPSULE: at 17:23

## 2020-06-17 RX ADMIN — METRONIDAZOLE 500 MG: 500 INJECTION, SOLUTION INTRAVENOUS at 21:40

## 2020-06-17 NOTE — PROGRESS NOTES
[facility] Medicine Services  PROGRESS NOTE     Patient Identification:  Name:  Davian Newell  Age:  60 y.o.  Sex:  male  :  1960  MRN:  [id number]  Visit Number:  [id number]  Primary Care Provider:  Provider, No Known    Length of stay:  1    ----------------------------------------------------------------------------------------------------------------------  Subjective     Chief Complaint:  Follow up for right foot abscess, diabetes     History of Presenting Illness/Hospital Course:  Patient is a 61 yo male with past medical history significant for recently diagnosed T2DM, recurrent right foot infection, and GERD that was admitted on 2020 secondary to right foot cellulitis/abscess with possible osteomyelitis. Please see admitting history and physical for further details. Patient was taken to the OR on 2020 per Dr. Daisy Gerard with podiatry and underwent incision and drainage of right foot abscess below the fascia, please see op note for details. Pending MRI today of right foot to rule out osteo, no osteo noted intraoperatively.     Subjective:  Today, the patient was sitting up on edge of bed eating lunch tray/watching television, no acute distress noted. Patient with no complaints. Pain controlled. Denies any fevers or chills. Denies any numbness, tingling, or paraesthesias. Patient reports regular bowel movements. Denies any abdominal pain, nausea or emesis. Denies any urinary symptoms. No chest pain or dyspnea. Pt states he has ambulated to and from restroom without any issues.     Present during exam: N/A   ----------------------------------------------------------------------------------------------------------------------  Objective     Consults:  • Podiatry     Procedures:  • 2020: Incision and drainage below fascia, right foot -- Dr. Daisy Gerard, podiatry     [facility] Meds:    cefepime 2 g Intravenous Q8H   heparin (porcine) 5,000  Units Subcutaneous Q8H   insulin aspart 0-9 Units Subcutaneous TID AC   metroNIDAZOLE 500 mg Intravenous Q8H   sodium chloride 10 mL Intravenous Q12H   vancomycin 1,250 mg Intravenous Q12H        ----------------------------------------------------------------------------------------------------------------------  Vital Signs:  Temp:  [97.3 °F (36.3 °C)-98.1 °F (36.7 °C)] 97.8 °F (36.6 °C)  Heart Rate:  [47-86] 52  Resp:  [8-18] 18  BP: (100-137)/(58-73) 122/73    SpO2 Percentage    06/16/20 1710 06/16/20 1857 06/16/20 2012   SpO2: 99% 99% 99%     SpO2:  [98 %-100 %] 99 %  on  Flow (L/min):  [2-4] 2;   Device (Oxygen Therapy): nasal cannula    Body mass index is 24.27 kg/m².  Wt Readings from Last 3 Encounters:   06/16/20 76.7 kg (169 lb 2 oz)   06/05/20 81.6 kg (180 lb)   11/16/19 84.4 kg (186 lb)        Intake/Output Summary (Last 24 hours) at 6/17/2020 1023  Last data filed at 6/17/2020 0610  Gross per 24 hour   Intake 3381.11 ml   Output 1280 ml   Net 2101.11 ml     Diet Regular; Consistent Carbohydrate  ----------------------------------------------------------------------------------------------------------------------  Physical exam:  Physical Exam   Constitutional: He is oriented to person, place, and time. He appears well-developed and well-nourished.  Non-toxic appearance. No distress.   Pleasant, sitting up on edge of bed, no acute distress noted.   HENT:   Head: Normocephalic and atraumatic.   Mouth/Throat: Mucous membranes are normal.   Eyes: Pupils are equal, round, and reactive to light. Conjunctivae are normal. No scleral icterus.   Neck: Normal range of motion. Neck supple. No muscular tenderness present.   Cardiovascular: Normal rate, regular rhythm, normal heart sounds and intact distal pulses. Exam reveals no gallop and no friction rub.   No murmur heard.  Pulses:       Right popliteal pulse not accessible.        Dorsalis pedis pulses are 2+ on the left side. Right dorsalis pedis pulse not  accessible.        Posterior tibial pulses are 2+ on the left side.   Unable to assess DP/PT of RLE d/t dressing. Cap refill is intact and less than 3 seconds.    Pulmonary/Chest: Effort normal and breath sounds normal. No accessory muscle usage. No tachypnea. No respiratory distress. He has no wheezes. He has no rhonchi. He has no rales. He exhibits no tenderness.   Abdominal: Soft. Bowel sounds are normal. He exhibits no distension. There is no tenderness. There is no rebound and no guarding.   Genitourinary:   Genitourinary Comments: No goodwin catheter in place.    Musculoskeletal: He exhibits no edema, tenderness or deformity.        Right lower leg: He exhibits no edema.        Left lower leg: He exhibits no edema.   RLE with ace wrap/bandage in place. No edema noted, no saturation of dressing, no erythema present. Cap refill intact. Skin on digits warm and dry.    Neurological: He is alert and oriented to person, place, and time. He has normal strength. No cranial nerve deficit or sensory deficit.   Awake and alert. Follows commands. Answers questions appropriately. Moves all extremities equally, strength and sensation intact. No focal neurological deficits on exam.    Skin: Skin is warm and dry. Capillary refill takes less than 2 seconds. No rash noted. No erythema. No pallor.   Psychiatric: He has a normal mood and affect. His speech is normal and behavior is normal. Judgment and thought content normal. Cognition and memory are normal.   Nursing note and vitals reviewed.     ----------------------------------------------------------------------------------------------------------------------  Tele:    Sinus 60s    I have personally reviewed the EKG/Telemetry strips   ----------------------------------------------------------------------------------------------------------------------            Results from last 7 days   Lab Units 06/17/20  0427 06/16/20  1334 06/16/20  0934   CRP mg/dL  --   --  1.07*      LACTATE mmol/L  --  1.7  --    WBC 10*3/mm3 6.66  --  5.11   HEMOGLOBIN g/dL 11.6*  --  13.4   HEMATOCRIT % 37.0*  --  41.6   MCV fL 93.4  --  92.0   MCHC g/dL 31.4*  --  32.2   PLATELETS 10*3/mm3 416  --  496*     Results from last 7 days   Lab Units 06/17/20  0427 06/16/20  1334 06/16/20  0934   SODIUM mmol/L 136 137 137   POTASSIUM mmol/L 4.2 4.7 4.6   MAGNESIUM mg/dL 2.1  --   --    CHLORIDE mmol/L 106 104 103   CO2 mmol/L 21.9* 24.2 25.9   BUN mg/dL 15 19 19   CREATININE mg/dL 0.81 0.93 0.86   EGFR IF NONAFRICN AM mL/min/1.73 97 83 91   CALCIUM mg/dL 8.6 8.9 9.5   GLUCOSE mg/dL 125* 178* 131*   ALBUMIN g/dL  --  3.07*  --    BILIRUBIN mg/dL  --  0.2  --    ALK PHOS U/L  --  77  --    AST (SGOT) U/L  --  16  --    ALT (SGPT) U/L  --  12  --    Estimated Creatinine Clearance: 105.2 mL/min (by C-G formula based on SCr of 0.81 mg/dL).    Glucose   Date/Time Value Ref Range Status   06/17/2020 0807 171 (H) 70 - 130 mg/dL Final   06/16/2020 2253 106 70 - 130 mg/dL Final   06/16/2020 1708 160 (H) 70 - 130 mg/dL Final   06/16/2020 1302 124 70 - 130 mg/dL Final   06/16/2020 1200 118 70 - 130 mg/dL Final   06/16/2020 0853 124 70 - 130 mg/dL Final     Lab Results   Component Value Date    HGBA1C 9.80 (H) 06/05/2020     Lab Results   Component Value Date    TSH 2.960 06/16/2020    FREET4 1.15 06/16/2020     Microbiology Results (last 10 days)     Procedure Component Value - Date/Time    Wound Culture - Swab, Foot, Right [[id number]] Collected:  06/16/20 1122    Lab Status:  Preliminary result Specimen:  Swab from Foot, Right Updated:  06/17/20 0707     Wound Culture No growth     Gram Stain Rare (1+) WBCs seen      No organisms seen    Wound Culture - Wound, Foot, Right [[id number]] Collected:  06/16/20 1000    Lab Status:  Preliminary result Specimen:  Wound from Foot, Right Updated:  06/17/20 0708     Wound Culture No growth     Gram Stain Few (2+) WBCs seen      No organisms seen    Blood Culture - Blood, Arm, Right  [[id number]] Collected:  06/16/20 0934    Lab Status:  Preliminary result Specimen:  Blood from Arm, Right Updated:  06/17/20 0945     Blood Culture No growth at 24 hours    Blood Culture - Blood, Arm, Left [[id number]] Collected:  06/16/20 0934    Lab Status:  Preliminary result Specimen:  Blood from Arm, Left Updated:  06/17/20 0945     Blood Culture No growth at 24 hours    COVID PRE-OP / PRE-PROCEDURE SCREENING ORDER (NO ISOLATION) - Swab, Nasopharynx [[id number]] Collected:  06/12/20 1205    Lab Status:  Final result Specimen:  Swab from Nasopharynx Updated:  06/15/20 0748    Narrative:       The following orders were created for panel order COVID PRE-OP / PRE-PROCEDURE SCREENING ORDER (NO ISOLATION) - Swab, Nasopharynx.  Procedure                               Abnormality         Status                     ---------                               -----------         ------                     COVID-19,[facility], NP ...[[id number]]                      Final result                 Please view results for these tests on the individual orders.    COVID-19,[facility], NP SWAB IN LEXAR SALINE MEDIA 24-30 HR TAT - Swab, Nasopharynx [[id number]] Collected:  06/12/20 1205    Lab Status:  Final result Specimen:  Swab from Nasopharynx Updated:  06/15/20 0748     Reference Lab Report [facility]     COVID19 Not Detected         I have personally reviewed the above laboratory results.   ----------------------------------------------------------------------------------------------------------------------  Imaging Results (Last 24 Hours)     Procedure Component Value Units Date/Time    US Ankle / Brachial Indices Extremity Complete [[id number]] Collected:  06/17/20 0946     Updated:  06/17/20 0949    Narrative:       FINDINGS:  Right: Right index brachial pressure of 128 mmHg.  Posterior tibialis arterial pressure of 150 mmHg for an  index of 1.07.  Dorsalis pedis artery  pressure of 164 mmHg for an  index of 1.17.  Left: Left  index brachial pressure of 140 mmHg.  Posterior tibialis artery pressure of 168 mmHg for an  index of 1.20.  Dorsalis pedis artery pressure of 167 mmHg for an  index of 1.19.    Impression:       Composite right ankle brachial index of 1.17and left of  1.20.     This report was finalized on 6/17/2020 9:47 AM by Dr. Mayito Baca MD.      I have personally reviewed the above radiology results.   ----------------------------------------------------------------------------------------------------------------------  Assessment/Plan     Active Hospital Problems    Diagnosis POA   • **Abscess of right foot [L02.611] Yes     Added automatically from request for surgery [id number]     • T2DM (type 2 diabetes mellitus) (CMS/[facility]) [E11.9] Yes   • GERD (gastroesophageal reflux disease) [K21.9] Yes   • Hypoalbuminemia [E88.09] Yes     · Right foot abscess with cellulitis, rule out osteomyelitis: Blood cultures with NGTD. Appreciate podiatry input/assistance. Intraoperative wound culture pending. Pending MRI of right foot today to rule out osteo, if positive will consult infectious disease. Continue empiric coverage with IV cefepime, flagyl, and vancomycin per pharmacy dosing. Monitor temp curve closely, remains afebrile. Lactobacillus for gut protection. Cont wound care per orders. DMITRY reviewed.   · Asymptomatic bradycardia: Monitor on tele.   · Recently diagnosed type II diabetes mellitus: Hemoglobin A1C 9.8. Blood glucose levels adequately controlled. Continue current SSI dose, titrate as necessary. Monitor blood glucose levels closely with accuchecks.   · Mild normocytic anemia: no active bleeding, no hematoma. Closely monitor H&H, transfuse if necessary. Daily CBC.   · GERD: PPI   · Hypoalbuminemia: Likely multifactorial. Monitor closely, repeat chem panel in AM.     --------------------------------------------------  DVT Prophylaxis: SQ heparin   GI Prophylaxis: PPI   FEN: No IV fluids. Replace electrolytes per protocol as  necessary. Consistent carbohydrate diet.   Activity: Up with assistance as tolerated   Disposition: Pending MRI, continues on IV abx  --------------------------------------------------    The patient is high risk due to the following diagnoses/reasons:  Right foot abscess, r/o osteomyelitis, recently dx T2DM    I have discussed the patient's assessment and plan with the patient, nursing staff, and attending physician.       Paulina Yip PA-C  Hospitalist Service -- [facility]   Pager: [phone]    06/17/20  10:23    Attending Physician: Andriy Sutton MD    ----------------------------------------------------------------------------------------------------------------------

## 2020-06-17 NOTE — PLAN OF CARE
Problem: Patient Care Overview  Goal: Plan of Care Review  Outcome: Ongoing (interventions implemented as appropriate)  Flowsheets (Taken 6/17/2020 6151)  Outcome Summary: Patient resting dressing change per DR. Gerard order, patient awaiting to go for MRI. vital signs stable, pt denies pain.

## 2020-06-17 NOTE — PAYOR COMM NOTE
"Contact: Gladys Chong RN @   Phone: [phone]  Fax: [phone]    Ref# [id number]  Inpatient status  Clinical       Davian Zazueta (60 y.o. Male)     Date of Birth Social Security Number Address Home Phone MRN    1960  [address] [id number] [phone] [id number]    Congregation Marital Status          [facility] Single       Admission Date Admission Type Admitting Provider Attending Provider Department, Room/Bed    6/16/20 Elective Daisy Gerard, Andriy Robles MD [address], 3343/2S    Discharge Date Discharge Disposition Discharge Destination                       Attending Provider:  Andriy Sutton MD    Allergies:  No Known Allergies    Isolation:  None   Infection:  None   Code Status:  CPR    Ht:  177.8 cm (70\")   Wt:  76.7 kg (169 lb 2 oz)    Admission Cmt:  None   Principal Problem:  Abscess of right foot [L02.611] More...                 Active Insurance as of 6/16/2020     Primary Coverage     Payor Plan Insurance Group Employer/Plan Group    CIGNA MISC CIGNA [id number]     Coverage Address Coverage Phone Number Coverage Fax Number Effective Dates    [id number] [phone]  1/1/2018 - None Entered    [facility] [id number]       Subscriber Name Subscriber Birth Date Member ID       DAVIAN ZAZUETA 1960 [id number]                 Emergency Contacts      (Rel.) Home Phone Work Phone Mobile Phone    GRIS HALL (Sister) [phone] -- --               History & Physical      Jayleen Knutson PA-C at 06/16/20 1100     Attestation signed by Andriy Sutton MD at 06/16/20 7453    I have reviewed and agree with the above documentation.     Mr. Zazueta is our 59 yo M with hx of recently diagnosed DM, GERD who presented to podiatry today for recurrent right foot infection. Patient noted taking scab off after his last admission and not being sterile including getting \"lake water\" in the wound from [facility]. " He reports since then it has got red warm and drainage has increased. He was recently admitted to our facility and was started on treatment for newly diagnosed DM II with metformin. He was also sent home with a week of Augmentin and doxycyline. Hospital medicine was called for admission after surgical debridement by podiatry for treatment of recurrent foot infection that failed outpatient treatment.     Exam:  Gen: NAD, resting supine in hospital bed.   HEENT: Neck supple. No JVD. Mucosa moist.   CV: RRR. No M/R/G  Pulm: CTAB, no wheezes or crackles   MSK: No joint swelling or redness   GI: Abdomen soft, nontender  Skin: No rashes or lumps noted.   EXT: Right foot bandage in place. No surrounding redness or warmth. No drainage or bleeding though noted.   Neuro: No focal neurological deficits, AAOX3  Psych: Blunted affect     A/P:    #Recurrent right foot infection   #Uncontrolled recently diagnosed DM II  #Asymptomatic sinus bradycardia     Patient not septic, WBC normal, afebrile, and CRP much improved since last admission. Depth of wound unclear but osteo not appreciated during debridement. Given persistence of wound will rule out osteo with bone scan. If foot scan concerning for osteomyelitis, will involve infectious disease. Will start cefipime, vanc, and flagyl. Intraoperative wound culture obtained. Suspect ulcers are diabetic foot wounds in nature, however, will get ABIs to rule out PAD.                          [facility] Medicine Services  History & Physical    Patient Identification:  Name:  Davian Newell  Age:  60 y.o.  Sex:  male  :  1960  MRN:  [id number]   Visit Number:  [id number]  Primary Care Physician:  Provider, No Known    Subjective     Chief complaint: Cellulitis and possible osteomyelitis    History of presenting illness:      Davian Newell is a 60 y.o. male with past medical history significant for diabetes mellitus, heartburn, arthritis.  He initially presented  to this facility today for outpatient surgery with Dr. Daisy Gerard.  He was recently hospitalized at this facility from June 5 through June 7, 2020 with right foot cellulitis and hyperglycemia with newly diagnosed diabetes mellitus type 2.  He was discharged with Augmentin, doxycycline, lactobacillus, and started on metformin in addition to being provided with a glucometer device kit.  Initial CT of the right foot during said admission was negative for known evidence of osteomyelitis.  He did complete outpatient Augmentin and doxycycline.  He followed up with  on June 12, 2020 and had purulent drainage with suspected abscess in the right foot observed.  He was then scheduled for incision and drainage of the right foot on June 16, 2020.  He presented to this facility on this date for this outpatient procedure.  It was felt that he would benefit from admission following this procedure for IV antibiotics and a bone scan to rule out underlying osteomyelitis.      Upon arrival to the outpatient surgery worsen of this facility, his vital signs were temperature 97.8 °F, pulse 61, respiration rate 20, and blood pressure 137/71.  Labs revealed white blood cell count of 5.11 with C-reactive protein of 1.07. Blood cultures were performed and pending and wound culture was performed as well.      Mr. Newell was evaluated by Jayleen Knutson PA-C in room 343B post-operatively. He reports he completed oral antibiotics and started Metformin.  He reports some mild diarrhea with both.  He denies chest pain, dyspnea, cough, and wheeze.  He reports worsening right foot redness and drainage prior to presentation. I am unable to see this during my exam as his right foot has just been dressed post-op.      ---------------------------------------------------------------------------------------------------------------------   Review of Systems   Constitutional: Negative for chills, fatigue and fever.   HENT: Negative for congestion  and drooling.    Eyes: Negative for pain and discharge.   Respiratory: Negative for cough, chest tightness and shortness of breath.    Cardiovascular: Negative for chest pain and leg swelling.   Gastrointestinal: Negative for abdominal distention, diarrhea and vomiting.   Endocrine: Negative for cold intolerance and heat intolerance.   Genitourinary: Negative for difficulty urinating and dysuria.   Musculoskeletal: Negative for arthralgias and back pain.   Skin: Positive for color change and wound.   Allergic/Immunologic: Negative for environmental allergies and food allergies.   Neurological: Negative for dizziness and headaches.   Psychiatric/Behavioral: Negative for agitation and confusion.        ---------------------------------------------------------------------------------------------------------------------   Past Medical History:   Diagnosis Date   • Arthritis    • Diabetes mellitus (CMS/[facility])    • Heartburn      Past Surgical History:   Procedure Laterality Date   • ANKLE ARTHROSCOPY     • COLONOSCOPY     • FRACTURE SURGERY       History reviewed. No pertinent family history.  Social History     Socioeconomic History   • Marital status: Single     Spouse name: Not on file   • Number of children: Not on file   • Years of education: Not on file   • Highest education level: Not on file   Tobacco Use   • Smoking status: Never Smoker   • Smokeless tobacco: Never Used   Substance and Sexual Activity   • Alcohol use: Yes   • Drug use: No   • Sexual activity: Defer     ---------------------------------------------------------------------------------------------------------------------   Allergies:  Patient has no known allergies.  ---------------------------------------------------------------------------------------------------------------------   Home medications:    Medications below are reported home medications pulling from within the system; at this time, these medications have not been reconciled unless  otherwise specified and are in the verification process for further verifcation as current home medications.  Medications Prior to Admission   Medication Sig Dispense Refill Last Dose   • metFORMIN (GLUCOPHAGE) 500 MG tablet Take 1 tablet by mouth 2 (Two) Times a Day With Meals. 60 tablet 0 6/16/2020 at 0800       Hospital Scheduled Meds:    cefepime 2 g Intravenous Once   cefepime 2 g Intravenous Q8H   heparin (porcine) 5,000 Units Subcutaneous Q8H   insulin aspart 0-9 Units Subcutaneous TID AC   metroNIDAZOLE 500 mg Intravenous Q8H   sodium chloride 10 mL Intravenous Q12H       lactated ringers 125 mL/hr Last Rate: Stopped (06/16/20 1116)   Pharmacy to dose vancomycin         Current listed hospital scheduled medications may not yet reflect those currently placed in orders that are signed and held awaiting patient's arrival to floor.   ---------------------------------------------------------------------------------------------------------------------     Objective     Vital Signs:  Temp:  [97.3 °F (36.3 °C)-97.8 °F (36.6 °C)] 97.7 °F (36.5 °C)  Heart Rate:  [47-61] 50  Resp:  [8-20] 18  BP: (100-137)/(65-73) 125/71      06/16/20  0900   Weight: 76.7 kg (169 lb 2 oz)     Body mass index is 24.27 kg/m².  ---------------------------------------------------------------------------------------------------------------------       Physical Exam   Constitutional: He is oriented to person, place, and time. He appears well-developed and well-nourished.   HENT:   Head: Normocephalic and atraumatic.   Eyes: Pupils are equal, round, and reactive to light. EOM are normal.   Neck: Normal range of motion. Neck supple.   Cardiovascular: Normal rate and regular rhythm. Exam reveals no friction rub.   No murmur heard.  Pulmonary/Chest: Effort normal and breath sounds normal. No respiratory distress.   Abdominal: Soft. Bowel sounds are normal.   Musculoskeletal:   RLE wrapped post-operatively   Neurological: He is alert and oriented  to person, place, and time.   Skin: Skin is warm and dry.   Psychiatric: He has a normal mood and affect. His behavior is normal.   Nursing note and vitals reviewed.            ---------------------------------------------------------------------------------------------------------------------  EKG:      EKG added & cardiac monitoring added given low pulse rates in the 40s          ---------------------------------------------------------------------------------------------------------------------   Results from last 7 days   Lab Units 06/16/20  0934   CRP mg/dL 1.07*   WBC 10*3/mm3 5.11   HEMOGLOBIN g/dL 13.4   HEMATOCRIT % 41.6   MCV fL 92.0   MCHC g/dL 32.2   PLATELETS 10*3/mm3 496*         Results from last 7 days   Lab Units 06/16/20  0934   SODIUM mmol/L 137   POTASSIUM mmol/L 4.6   CHLORIDE mmol/L 103   CO2 mmol/L 25.9   BUN mg/dL 19   CREATININE mg/dL 0.86   EGFR IF NONAFRICN AM mL/min/1.73 91   CALCIUM mg/dL 9.5   GLUCOSE mg/dL 131*   Estimated Creatinine Clearance: 99.1 mL/min (by C-G formula based on SCr of 0.86 mg/dL).  No results found for: AMMONIA          Lab Results   Component Value Date    HGBA1C 9.80 (H) 06/05/2020     Lab Results   Component Value Date    TSH 2.960 06/16/2020    FREET4 1.15 06/16/2020     No results found for: PREGTESTUR, PREGSERUM, HCG, HCGQUANT  Pain Management Panel     There is no flowsheet data to display.        No results found for: BLOODCX  No results found for: URINECX  No results found for: WOUNDCX  No results found for: STOOLCX      ---------------------------------------------------------------------------------------------------------------------  Imaging Results (Last 7 Days)     ** No results found for the last 168 hours. **          Cultures:  No results found for: BLOODCX, URINECX, WOUNDCX, MRSACX, RESPCX, STOOLCX    Last echocardiogram:               I have personally reviewed the above radiology images and read the final radiology report on  06/16/20  ---------------------------------------------------------------------------------------------------------------------  Assessment / Plan     Active Hospital Problems    Diagnosis POA   • **Abscess of right foot [L02.611] Unknown     Added automatically from request for surgery [id number]         ASSESSMENT/PLAN:  · Right foot cellulitis with abscess and possible osteomyelitis: Bone scan has been ordered. Blood cultures performed and wound culture performed. Will monitor daily CBC & C-RP. IV cefepime, flagyl, and vancomycin ordered for now. Wound culture from earlier this month reviewed and available within this document with growth of Streptococcus agalactiea.  NM bone scan has been ordered.     · Diabetes Mellitus type 2, NID: FSBG ACHS with SSI PRN ordered.  Hemoglobin a1c was 9.8 on 6/5/2020.     · Low heart rates with concern for sinus bradycardia: EKG added x1.  Cardiac monitoring added.   Will check TSH/free t4. Denies chest pain or dizziness.        ----------  -DVT prophylaxis: SQ Heparin  -Activity: Per podiatry  -Expected length of stay:   OBSERVATION status; however, if further evaluation or treatment plans warrant, status may change.  Based upon current information, I predict patient's care encounter to be less than or equal to 2 midnights          High risk secondary to right foot cellulitis with concern for limb threatening osteomyelitis in the setting of newly diagnosed diabetes mellitus type 2.    Jayleen Knutson PA-C  06/16/20  13:08  Pager # [phone]  ---------------------------------------------------------------------------------------------------------------------             Electronically signed by Andriy Sutton MD at 06/16/20 7477     Daisy Gerard DPM at 06/16/20 0966          PODIATRIC SURGERY  History and Physical      Principal problem: Abscess of right foot    Subjective .     History of present illness:    Mr. Davian Newell is a 60 y.o. years old male with right  foot infection. He was recently diagnosed with diabetes, HbA1C 9.8%, and started on metformin about 2 weeks ago. About three weeks ago he developed a callus on the lateral aspect of his right foot, he ripped it off and experienced progressing redness, swelling, warmth, and pain. He was admitted for IV antibiotics from 6/5 to 6/7 for treatment of cellulitis. A CT right foot during that admission was negative for osteomyelitis. He was discharged with one week PO Augmentin and Doxycycline, which he did complete. Upon outpatient follow up, purulent drainage with suspected abscess in the right foot was observed, he was then scheduled for incision and drainage of the right foot. An admission following surgery is planned for IV antibiotics, bone scan to rule out osteomyelitis, and possible infectious disease consult.     History taken from: patient    Case was discussed with patient    Review of Systems    Constitutional: no fever, chills and night sweats. No appetite change or unexpected weight change. No fatigue.  Eyes: no eye drainage, itching or redness.  HEENT: no mouth sores, dysphagia or nose bleed.  Respiratory: no for shortness of breath, cough or production of sputum.  Cardiovascular: no chest pain, no palpitations, no orthopnea.  Gastrointestinal: no nausea, vomiting or diarrhea. No abdominal pain, hematemesis or rectal bleeding.  Genitourinary: no dysuria or polyuria.  Hematologic/lymphatic: no lymph node abnormalities, no easy bruising or easy bleeding.  Musculoskeletal: no muscle or joint pain.  Skin: No rash and no itching.  Neurological: no loss of consciousness, no seizure, no headache.  Behavioral/Psych: no depression or suicidal ideation.  Endocrine: no hot flashes.  Immunologic: negative.    Past Medical History    Past Medical History:   Diagnosis Date   • Arthritis    • Diabetes mellitus (CMS/HCC)    • Heartburn        Past Surgical History    Past Surgical History:   Procedure Laterality Date   •  "ANKLE ARTHROSCOPY     • COLONOSCOPY     • FRACTURE SURGERY         Family History    History reviewed. No pertinent family history.    Social History    Social History     Tobacco Use   • Smoking status: Never Smoker   • Smokeless tobacco: Never Used   Substance Use Topics   • Alcohol use: Yes   • Drug use: No       Allergies    Patient has no known allergies.    Objective     /71 (BP Location: Left arm, Patient Position: Lying)   Pulse 61   Temp 97.8 °F (36.6 °C) (Oral)   Resp 20   Ht 177.8 cm (70\")   Wt 76.7 kg (169 lb 2 oz)   SpO2 99%   BMI 24.27 kg/m²      Temp:  [97.8 °F (36.6 °C)] 97.8 °F (36.6 °C)      No intake or output data in the 24 hours ending 06/16/20 0923      Physical Exam:     General Appearance:    Alert, cooperative, in no acute distress   Head:    Normocephalic, without obvious abnormality, atraumatic    Heart:    Regular rhythm and normal rate     Chest Wall:    No abnormalities observed   Abdomen:    Soft non-tender, non-distended, no guarding, no rebound                tenderness   Extremities:   Moves all extremities well, no cyanosis. Right plantar lateral foot multiple ulcerations with purulent drainage. Cellulitis extending to ankle.   Pulses:   Pulses palpable and equal bilaterally   Skin:   No bleeding, bruising or rash   Lymph nodes:   No palpable adenopathy                   Lab Results   Component Value Date    NEUTROABS 6.04 06/07/2020                   Imaging Results (Last 24 Hours)     ** No results found for the last 24 hours. **            Results Review:    I have personally reviewed laboratory data, culture results, radiology studies and antimicrobial therapy.    Hospital Medications (active)       Dose Frequency Start End    lactated ringers infusion 125 mL/hr Continuous 6/16/2020     Route: Intravenous    sodium chloride 0.9 % flush 10 mL 10 mL Every 12 Hours Scheduled 6/16/2020     Route: Intravenous    sodium chloride 0.9 % flush 10 mL 10 mL As Needed " 6/16/2020     Route: Intravenous    vancomycin 1 g/250 mL 0.9% NS (vial-mate) 1,000 mg Once 6/16/2020     Route: Intravenous            PROBLEM LIST:    Patient Active Problem List   Diagnosis   • Cellulitis of foot, right   • Hyperglycemia   • Abscess of right foot       Assessment/Plan     ASSESSMENT:    60 year old male with right foot ulcerations and abscess    PLAN:    -Proceeding with incision and drainage after thorough discussion of all alternatives, risks, and benefits.      Patients findings and recommendations were discussed with patient    Code Status:   There are no questions and answers to display.       Daisy Gerard DPM  06/16/20  09:23            Electronically signed by Daisy Gerard DPM at 06/16/20 0938          Operative/Procedure Notes (last 48 hours) (Notes from 06/15/20 0925 through 06/17/20 0925)      Daisy Gerard DPM at 06/16/20 1107        Davian Newell  6/16/2020      Operative Progress Note:    Surgeon and Assistant: Dr. Daisy Gerard DPM    Pre-Operative Diagnosis:   1.  Right foot abscess and cellulitis  2.  Right foot diabetic ulceration    Post-Operative Diagnosis:   1.  Right foot abscess and cellulitis  2.  Right foot diabetic ulceration    Procedure(s):  Right foot incision and drainage below fascia    Type of Anesthesia Administered: General with 10mL ankle block 0.5% marcaine    Estimated Blood Loss: 2mL    Hemostasis: ankle tourniquet inflated to 250mmHg    Blood Products: None    Specimen Obtained/Removed: Swab culture sent to pathology    Complication(s):  None    Graft/Implant/Prosthetics/Implanted Device/Transplants: None    Indication: 60 year old male with non-healing diabetic ulceration lateral right foot. He has had recurrent signs of retained fluid and abscess after both IV and oral antibiotics and bedside irrigation. He presents today for surgical incision and drainage of the right foot after thorough discussion of all alternatives, risks and  benefits.    Findings: Right foot multiple plantar lateral 5th metatarsal ulcerations, largest at the metatarsal head 1.5cm x 1cm. Purulent drainage tracking to the plantar midfoot. No signs of bone necrosis.    Operative Report:  Patient was identified in the preoperative holding area formal consent was signed and the right lower extremity was marked as correct site.  Patient was brought to the operating suite and placed in the operating table in the supine position.  Timeout was performed he then he underwent general anesthesia.  A well-padded ankle tourniquet was applied to the right.  The right lower extremity was then scrubbed prepped and draped in the usual sterile manner.  Attention was then focused to the plantar lateral fifth metatarsal but there is noted to be multiple ulcerations as well as cellulitis extending both dorsally and plantarly to the ankle.  The foot was elevated and the tourniquet was inflated.  Approximately 4 cm incision was made at the plantar lateral aspect of the fifth metatarsal using a 15 blade.  Hemostats were used to explore the wound there is noted to be no tracking on the dorsal aspect of the foot but there was 5 to 6 cm tracking to the plantar midfoot with approximately 2 mL of purulent fluid expressed from this area.  There were no signs of bone necrosis.  Swab culture was taken of the plantar foot.  The surgical site was then copiously irrigated with a mixture of sterile saline, bacitracin, polymyxin.  Vancomycin powder was then applied to the surgical site.  2-0 nylon retention sutures were placed.  And the incision was packed with Betadine soaked quarter inch packing.  Ankle block was performed at this time.  Dry sterile dressing was applied, the tourniquet was deflated, there was noted to be brisk capillary refill to all digits.  The patient was extubated and transferred to PACU with all vital signs stable. Patient will be admitted under the care of the hospitalist for  continuation of IV antibiotics as well as bone scan to evaluate for osteomyelitis.      Electronically Signed by: Daisy Gerard DPM        Dictated Utilizing Dragon Dictation      Electronically signed by Daisy Gerard DPM at 06/16/20 1138         Physician Progress Notes (last 48 hours) (Notes from 06/15/20 0925 through 06/17/20 0925)    No notes of this type exist for this encounter.       Consult Notes (last 48 hours) (Notes from 06/15/20 0925 through 06/17/20 0925)    No notes of this type exist for this encounter.       All medication doses during the admission are shown, including meds that are no longer on order.   Scheduled Meds Sorted by Name   for Davian Newell as of 06/17/20 0926     1 Day 3 Days 7 Days 10 Days < Today >    Legend:                           Inactive     Active     Other Encounter    Linked               Medications 06/08 06/09 06/10 06/11 06/12 06/13 06/14 06/15 06/16 06/17   ceFAZolin Sodium-Dextrose (ANCEF) IVPB (duplex) 2 g   Dose: 2 g  Freq: Once Route: IV  Indications of Use: PERIOPERATIVE PHARMACOPROPHYLAXIS  Start: 06/16/20 0844 End: 06/16/20 0912    Admin Instructions:   Administer Within 1 Hour of Surgical Incision.  Redose 4 Hours From Pre-Op Dose if Procedure Ongoing or Blood Loss Greater Than 1.5 L  Caution: Look alike/sound alike drug alert            0844     0912-D/C'd       cefepime (MAXIPIME) 2 g/100 mL 0.9% NS (mbp)   Dose: 2 g  Freq: Every 8 Hours Route: IV  Indications of Use: SKIN AND SOFT TISSUE INFECTION  Start: 06/16/20 2200 End: 06/21/20 2159    Admin Instructions:   Break seal and mix to activate vial before use            2243 8010     1400     2200        cefepime (MAXIPIME) 2 g/100 mL 0.9% NS (mbp)   Dose: 2 g  Freq: Once Route: IV  Last Dose: 2 g (06/16/20 1425)  Start: 06/16/20 1400 End: 06/16/20 1455    Admin Instructions:   Break seal and mix to activate vial before use            1425         heparin (porcine) 5000 UNIT/ML injection 5,000  Units   Dose: 5,000 Units  Freq: Every 8 Hours Scheduled Route: SC  Indications Comment: Prophylaxis of Venous Thromboembolism  Start: 06/16/20 1400            1425     2249      0610     1400     2200        insulin aspart (novoLOG) injection 0-9 Units   Dose: 0-9 Units  Freq: 3 Times Daily Before Meals Route: SC  Start: 06/16/20 1230    Admin Instructions:   Correction - Moderate Dose.  40-60 units/day total insulin dose or average weight, on oral agents    Blood glucose 150-199 mg/dL - 2 units  Blood glucose 200-249 mg/dL - 4 units  Blood glucose 250-299 mg/dL - 6 units  Blood glucose 300-349 mg/dL - 7 units  Blood glucose 350-400 mg/dL - 8 units  Blood glucose greater than 400 mg/dL - 9 units and call provider              [phone]        metroNIDAZOLE (FLAGYL) 500 mg/100mL IVPB   Dose: 500 mg  Freq: Every 8 Hours Route: IV  Indications of Use: SKIN AND SOFT TISSUE INFECTION  Start: 06/16/20 1400 End: 06/21/20 1359    Admin Instructions:   Caution: Look alike/sound alike drug alert. Do not refrigerate.            1540     2100      0500     1400     2200        sodium chloride 0.9 % flush 10 mL   Dose: 10 mL  Freq: Every 12 Hours Scheduled Route: IV  Start: 06/16/20 0950            0950     ([id number])      0900     2100        sodium chloride 0.9 % flush 10 mL   Dose: 10 mL  Freq: Every 12 Hours Scheduled Route: IV  Start: 06/16/20 0911 End: 06/16/20 1134            1134-D/C'd  (1325)         vancomycin 1 g/250 mL 0.9% NS (vial-mate)   Dose: 1,000 mg  Freq: Once Route: IV  Indications of Use: BONE AND/OR JOINT INFECTION  Last Dose: 1,000 mg (06/16/20 1008)  Start: 06/16/20 1100 End: 06/16/20 1050            1008     1050         vancomycin 1250 mg/250 mL 0.9% NS IVPB ([facility])   Dose: 1,250 mg  Freq: Every 12 Hours Route: IV  Indications of Use: SKIN AND SOFT TISSUE INFECTION  Start: 06/16/20 2200 End: 06/21/20 2159            2200      1000     2200        Medications 06/08 06/09  06/10 06/11 06/12 06/13 06/14 06/15 06/16 06/17       Continuous Meds Sorted by Name   for Davian Newell as of 06/17/20 0926    Legend:                           Inactive     Active     Other Encounter    Linked               Medications 06/08 06/09 06/10 06/11 06/12 06/13 06/14 06/15 06/16 06/17   lactated ringers infusion   Rate: 125 mL/hr Dose: 125 mL/hr  Freq: Continuous Route: IV  Last Dose: 125 mL/hr (06/17/20 0610)  Start: 06/16/20 0911            0915     1050     1116     1826      0610        Pharmacy to dose vancomycin   Freq: Continuous Route: XX  Indications of Use: SKIN AND SOFT TISSUE INFECTION  Start: 06/16/20 0950 End: 06/16/20 1314            1314-D/C'd  (1317)             PRN Meds Sorted by Name   for Davian Newell as of 06/17/20 0926    Legend:                           Inactive     Active     Other Encounter    Linked               Medications 06/08 06/09 06/10 06/11 06/12 06/13 06/14 06/15 06/16 06/17   acetaminophen (TYLENOL) tablet 650 mg   Dose: 650 mg  Freq: Every 4 Hours PRN Route: PO  PRN Reason: Mild Pain   Start: 06/16/20 0946    Admin Instructions:   Do not exceed 4 grams of acetaminophen in a 24 hr period.    If given for pain, use the following pain scale:   Mild Pain = Pain Score of 1-3, CPOT 1-2  Moderate Pain = Pain Score of 4-6, CPOT 3-4  Severe Pain = Pain Score of 7-10, CPOT 5-8                Or  acetaminophen (TYLENOL) 160 MG/5ML solution 650 mg   Dose: 650 mg  Freq: Every 4 Hours PRN Route: PO  PRN Reason: Mild Pain   Start: 06/16/20 0946    Admin Instructions:   Do not exceed 4 grams of acetaminophen in a 24 hr period.    If given for pain, use the following pain scale:   Mild Pain = Pain Score of 1-3, CPOT 1-2  Moderate Pain = Pain Score of 4-6, CPOT 3-4  Severe Pain = Pain Score of 7-10, CPOT 5-8                Or  acetaminophen (TYLENOL) suppository 650 mg   Dose: 650 mg  Freq: Every 4 Hours PRN Route: RE  PRN Reason: Mild Pain   Start: 06/16/20 0946    Admin  Instructions:   Do not exceed 4 grams of acetaminophen in a 24 hr period.    If given for pain, use the following pain scale:   Mild Pain = Pain Score of 1-3, CPOT 1-2  Moderate Pain = Pain Score of 4-6, CPOT 3-4  Severe Pain = Pain Score of 7-10, CPOT 5-8                bacitracin 50,000 Units, polymyxin B 500,000 Units in sodium chloride (NS) 1,000 mL irrigation   Freq: As Needed  Start: 06/16/20 1125 End: 06/16/20 1134            1125     1134-D/C'd       bupivacaine (MARCAINE) 0.5 % injection   Freq: As Needed  Start: 06/16/20 1126 End: 06/16/20 1136            1126     1136-D/C'd       dextrose (D50W) 25 g/ 50mL Intravenous Solution 25 g   Dose: 25 g  Freq: Every 15 Minutes PRN Route: IV  PRN Reason: Low Blood Sugar  PRN Comment: Blood Sugar Less Than 70  Start: 06/16/20 0947    Admin Instructions:   Blood sugar less than 70; patient has IV access - Unresponsive, NPO or Unable To Safely Swallow                dextrose (GLUTOSE) oral gel 15 g   Dose: 15 g  Freq: Every 15 Minutes PRN Route: PO  PRN Reason: Low Blood Sugar  PRN Comment: Blood sugar less than 70  Start: 06/16/20 0947    Admin Instructions:   BS<70, Patient Alert, Is not NPO, Can safely swallow.                famotidine (PEPCID) injection   Freq: As Needed Route: IV  Start: 06/16/20 1050 End: 06/16/20 1130            1050     1130-D/C'd       fentaNYL citrate (PF) (SUBLIMAZE) injection   Freq: As Needed Route: IV  Start: 06/16/20 1050 End: 06/16/20 1130            1050     1126     1130-D/C'd       fentaNYL citrate (PF) (SUBLIMAZE) injection 50 mcg   Dose: 50 mcg  Freq: Every 5 Minutes PRN Route: IV  PRN Reasons: Moderate Pain ,Severe Pain   Start: 06/16/20 1127 End: 06/16/20 1219    Admin Instructions:   Maximum total dose of fentanyl is 100 mcg.  If given for pain, use the following pain scale:  Mild Pain = Pain Score of 1-3, CPOT 1-2  Moderate Pain = Pain Score of 4-6, CPOT 3-4  Severe Pain = Pain Score of 7-10, CPOT 5-8            1219-D/C'd        glucagon (human recombinant) (GLUCAGEN DIAGNOSTIC) injection 1 mg   Dose: 1 mg  Freq: Every 15 Minutes PRN Route: SC  PRN Reason: Low Blood Sugar  PRN Comment: Blood Glucose Less Than 70  Start: 06/16/20 0947    Admin Instructions:   Blood Glucose Less Than 70 - Patient Without IV Access - Unresponsive, NPO or Unable To Safely Swallow                ipratropium-albuterol (DUO-NEB) nebulizer solution 3 mL   Dose: 3 mL  Freq: Once As Needed Route: NEBULIZATION  PRN Reasons: Wheezing,Shortness of Air  PRN Comment: bronchospasm  Start: 06/16/20 1127 End: 06/16/20 1219            1219-D/C'd       ketorolac (TORADOL) injection   Freq: As Needed  Start: 06/16/20 1059 End: 06/16/20 1130            1059     1130-D/C'd       lidocaine (XYLOCAINE) 2% injection   Freq: As Needed Route: IV  Start: 06/16/20 1057 End: 06/16/20 1130            1057     1130-D/C'd       meperidine (DEMEROL) injection 12.5 mg   Dose: 12.5 mg  Freq: Every 5 Minutes PRN Route: IV  PRN Reason: Shivering  PRN Comment: May repeat x 1  Start: 06/16/20 1127 End: 06/16/20 1219    Admin Instructions:               1219-D/C'd       midazolam (VERSED) injection   Freq: As Needed Route: IV  Start: 06/16/20 1050 End: 06/16/20 1130            1050     1130-D/C'd       ondansetron (ZOFRAN) injection   Freq: As Needed Route: IV  Start: 06/16/20 1050 End: 06/16/20 1130            1050     1130-D/C'd       ondansetron (ZOFRAN) injection 4 mg   Dose: 4 mg  Freq: As Needed Route: IV  PRN Reasons: Nausea,Vomiting  Start: 06/16/20 1127 End: 06/16/20 1219    Admin Instructions:   If BOTH ondansetron (ZOFRAN) and promethazine (PHENERGAN) are ordered use ondansetron first and THEN promethazine IF ondansetron is ineffective.            1219-D/C'd       oxyCODONE-acetaminophen (PERCOCET) 5-325 MG per tablet 1 tablet   Dose: 1 tablet  Freq: Once As Needed Route: PO  PRN Reason: Moderate Pain   Start: 06/16/20 1127 End: 06/16/20 1219            1219-D/C'd       Propofol  (DIPRIVAN) injection   Freq: As Needed Route: IV  Start: 06/16/20 1057 End: 06/16/20 1130            1057     1130-D/C'd       sodium chloride 0.9 % flush 10 mL   Dose: 10 mL  Freq: As Needed Route: IV  PRN Reason: Line Care  Start: 06/16/20 0940                sodium chloride 0.9 % flush 10 mL   Dose: 10 mL  Freq: As Needed Route: IV  PRN Reason: Line Care  Start: 06/16/20 0909 End: 06/16/20 1134            1134-D/C'd       vancomycin (VANCOCIN) injection   Freq: As Needed  Start: 06/16/20 1126 End: 06/16/20 1136            1126 [C]     1136-D/C'd       Medications 06/08 06/09 06/10 06/11 06/12 06/13 06/14 06/15 06/16 06/17

## 2020-06-17 NOTE — PROGRESS NOTES
PODIATRIC SURGERY PROGRESS NOTE     LOS: 1 day     Subjective     Interval History:     1 day s/p right foot incision and drainage. Minimal right foot pain. No nausea, chills.    Objective     Vital Signs  Temp:  [97.5 °F (36.4 °C)-98.1 °F (36.7 °C)] 97.5 °F (36.4 °C)  Heart Rate:  [50-86] 57  Resp:  [16-18] 18  BP: (109-137)/(58-73) 126/63    Labs & Rads    Imaging Results (Last 72 Hours)     Procedure Component Value Units Date/Time    US Ankle / Brachial Indices Extremity Complete [[id number]] Collected:  06/17/20 0946     Updated:  06/17/20 0949    Narrative:       EXAMINATION: US ANKLE / BRACHIAL INDICES EXTREMITY COMPLETE-      Technique: Ankle/brachial index pressures performed by noninvasive  vascular laboratory.     COMPARISON:    None available.     CLINICAL INDICATION:    Peripheral vascular disease.     FINDINGS:     Right: Right index brachial pressure of 128 mmHg.     Posterior tibialis arterial pressure of 150 mmHg for an  index of 1.07.     Dorsalis pedis artery  pressure of 164 mmHg for an  index of 1.17.     Left: Left index brachial pressure of 140 mmHg.     Posterior tibialis artery pressure of 168 mmHg for an  index of 1.20.     Dorsalis pedis artery pressure of 167 mmHg for an  index of 1.19.       Impression:       Composite right ankle brachial index of 1.17and left of  1.20.     This report was finalized on 6/17/2020 9:47 AM by Dr. Mayito Baca MD.           Results from last 7 days   Lab Units 06/17/20  0427 06/16/20  0934   WBC 10*3/mm3 6.66 5.11   CRP mg/dL  --  1.07*     Lab Results   Component Value Date    NEUTROABS 4.12 06/17/2020     Results from last 7 days   Lab Units 06/17/20  0427   CREATININE mg/dL 0.81       Blood Culture   Date Value Ref Range Status   06/16/2020 No growth at 24 hours  Preliminary   06/16/2020 No growth at 24 hours  Preliminary     No results found for: BCIDPCR, CXREFLEX, CSFCX, CULTURETIS  No results found for: CULTURES, HSVCX, URCX  No results found for:  EYECULTURE, GCCX, LABHSV  No results found for: LEGIONELLA, MRSACX, MUMPSCX, MYCOPLASCX  No results found for: NOCARDIACX, STOOLCX  No results found for: THROATCX, UNSTIMCULT, URINECX, CULTURE, VZVCULTUR  Wound Culture   Date Value Ref Range Status   06/16/2020 No growth  Preliminary       Results Review:    I have personally reviewed laboratory data, culture results, radiology studies and antimicrobial therapy.      Physical Exam:  Suture in lateral right foot with packing to proximal incision. Mild periwound erythema and edema, no warmth. Mild serosanguinous drainage. No malodor.     Results Review:     I reviewed the patient's new clinical results.      Assessment:  Principal Problem:    Abscess of right foot  Active Problems:    T2DM (type 2 diabetes mellitus) (CMS/[facility])    GERD (gastroesophageal reflux disease)    Hypoalbuminemia      Plan:  S/p right foot incision and drainge 6/16/20.     -Packing removed and new dressing applied.  -Cultures negative to date  -DMITRY 6/17: mildly elevated, normal waveforms.    -MRI pending to evaluate for osteomyelitis  -Continue medical management per hospitalist  -Heel touch WB RLE.       Daisy Gerard DPM  06/17/20  12:37

## 2020-06-17 NOTE — PROGRESS NOTES
Discharge Planning Assessment   [facility]     Patient Name: Davian Newell  MRN: [id number]  Today's Date: 6/17/2020    Admit Date: 6/16/2020    Discharge Needs Assessment     Row Name 06/17/20 1118       Living Environment    Lives With  sibling(s) Pt lives with sister, Jeanne.     Current Living Arrangements  home/apartment/condo    Primary Care Provided by  self    Provides Primary Care For  no one    Family Caregiver if Needed  sibling(s) Sister is a RN and will assist with wound care at home as needed.     Quality of Family Relationships  helpful;involved;supportive    Able to Return to Prior Arrangements  yes       Transition Planning    Patient/Family Anticipates Transition to  home with family    Transportation Anticipated  family or friend will provide Sister to provide transportation at discharge.        Discharge Needs Assessment    Equipment Currently Used at Home  crutches Pt has crutches from [facility].     Equipment Needed After Discharge  none Pt declines additional DME.     Outpatient/Agency/Support Group Needs  -- Pt does not utilize home health services. Pt states his sister will assist with wound care at home, therefore he does not need home health services. SS to follow pt and assist with home IV antibiotic arrangements if needed.         Discharge Plan     Row Name 06/17/20 1122       Plan    Plan Pt lives with his sister that is an RN and he plans to return home at discharge. Pt does not utilize home health services. Pt has crutches from [facility]. Pt does not have a POA or living will. Pt does not have a PCP. Pt wants to make himself a f/u with a PCP after discharge. SS to follow and assist as needed with discharge planning.     Patient/Family in Agreement with Plan  yes     Demographic Summary     Row Name 06/17/20 1117       General Information    Referral Source  nursing    Reason for Consult  -- SS received consult d/c planning; recurrent (R) foot cellulitis/abscess; s/p I&D 6/16/20 & on IV abx; poss  ostemyelitis. SS spoke to EARLENE Abel

## 2020-06-17 NOTE — PLAN OF CARE
Problem: Patient Care Overview  Goal: Plan of Care Review  Outcome: Ongoing (interventions implemented as appropriate)  Flowsheets (Taken 6/17/2020 0148)  Progress: no change  Plan of Care Reviewed With: patient  Note:   No complaints or acute changes. Will continue to monitor

## 2020-06-18 LAB
ANION GAP SERPL CALCULATED.3IONS-SCNC: 8.8 MMOL/L (ref 5–15)
BASOPHILS # BLD AUTO: 0.04 10*3/MM3 (ref 0–0.2)
BASOPHILS NFR BLD AUTO: 0.7 % (ref 0–1.5)
BUN BLD-MCNC: 13 MG/DL (ref 8–23)
BUN/CREAT SERPL: 18.1 (ref 7–25)
CALCIUM SPEC-SCNC: 8.6 MG/DL (ref 8.6–10.5)
CHLORIDE SERPL-SCNC: 106 MMOL/L (ref 98–107)
CO2 SERPL-SCNC: 22.2 MMOL/L (ref 22–29)
CREAT BLD-MCNC: 0.72 MG/DL (ref 0.76–1.27)
DEPRECATED RDW RBC AUTO: 40.6 FL (ref 37–54)
EOSINOPHIL # BLD AUTO: 0.15 10*3/MM3 (ref 0–0.4)
EOSINOPHIL NFR BLD AUTO: 2.7 % (ref 0.3–6.2)
ERYTHROCYTE [DISTWIDTH] IN BLOOD BY AUTOMATED COUNT: 12 % (ref 12.3–15.4)
GFR SERPL CREATININE-BSD FRML MDRD: 111 ML/MIN/1.73
GLUCOSE BLD-MCNC: 115 MG/DL (ref 65–99)
GLUCOSE BLDC GLUCOMTR-MCNC: 121 MG/DL (ref 70–130)
GLUCOSE BLDC GLUCOMTR-MCNC: 144 MG/DL (ref 70–130)
GLUCOSE BLDC GLUCOMTR-MCNC: 148 MG/DL (ref 70–130)
GLUCOSE BLDC GLUCOMTR-MCNC: 172 MG/DL (ref 70–130)
HCT VFR BLD AUTO: 36.7 % (ref 37.5–51)
HGB BLD-MCNC: 11.7 G/DL (ref 13–17.7)
IMM GRANULOCYTES # BLD AUTO: 0.02 10*3/MM3 (ref 0–0.05)
IMM GRANULOCYTES NFR BLD AUTO: 0.4 % (ref 0–0.5)
LYMPHOCYTES # BLD AUTO: 2.04 10*3/MM3 (ref 0.7–3.1)
LYMPHOCYTES NFR BLD AUTO: 36.2 % (ref 19.6–45.3)
MAGNESIUM SERPL-MCNC: 2.2 MG/DL (ref 1.6–2.4)
MCH RBC QN AUTO: 29.5 PG (ref 26.6–33)
MCHC RBC AUTO-ENTMCNC: 31.9 G/DL (ref 31.5–35.7)
MCV RBC AUTO: 92.4 FL (ref 79–97)
MONOCYTES # BLD AUTO: 0.67 10*3/MM3 (ref 0.1–0.9)
MONOCYTES NFR BLD AUTO: 11.9 % (ref 5–12)
NEUTROPHILS # BLD AUTO: 2.71 10*3/MM3 (ref 1.7–7)
NEUTROPHILS NFR BLD AUTO: 48.1 % (ref 42.7–76)
NRBC BLD AUTO-RTO: 0 /100 WBC (ref 0–0.2)
PHOSPHATE SERPL-MCNC: 3.3 MG/DL (ref 2.5–4.5)
PLATELET # BLD AUTO: 419 10*3/MM3 (ref 140–450)
PMV BLD AUTO: 10.1 FL (ref 6–12)
POTASSIUM BLD-SCNC: 3.9 MMOL/L (ref 3.5–5.2)
RBC # BLD AUTO: 3.97 10*6/MM3 (ref 4.14–5.8)
SODIUM BLD-SCNC: 137 MMOL/L (ref 136–145)
VANCOMYCIN TROUGH SERPL-MCNC: 11 MCG/ML (ref 5–20)
WBC NRBC COR # BLD: 5.63 10*3/MM3 (ref 3.4–10.8)

## 2020-06-18 PROCEDURE — 25010000002 HEPARIN (PORCINE) PER 1000 UNITS: Performed by: INTERNAL MEDICINE

## 2020-06-18 PROCEDURE — 80202 ASSAY OF VANCOMYCIN: CPT

## 2020-06-18 PROCEDURE — 99233 SBSQ HOSP IP/OBS HIGH 50: CPT | Performed by: PHYSICIAN ASSISTANT

## 2020-06-18 PROCEDURE — 82962 GLUCOSE BLOOD TEST: CPT

## 2020-06-18 PROCEDURE — 80048 BASIC METABOLIC PNL TOTAL CA: CPT | Performed by: PHYSICIAN ASSISTANT

## 2020-06-18 PROCEDURE — 94799 UNLISTED PULMONARY SVC/PX: CPT

## 2020-06-18 PROCEDURE — 97116 GAIT TRAINING THERAPY: CPT

## 2020-06-18 PROCEDURE — 85025 COMPLETE CBC W/AUTO DIFF WBC: CPT | Performed by: PHYSICIAN ASSISTANT

## 2020-06-18 PROCEDURE — 97162 PT EVAL MOD COMPLEX 30 MIN: CPT

## 2020-06-18 PROCEDURE — 83735 ASSAY OF MAGNESIUM: CPT | Performed by: PHYSICIAN ASSISTANT

## 2020-06-18 PROCEDURE — 63710000001 INSULIN ASPART PER 5 UNITS: Performed by: INTERNAL MEDICINE

## 2020-06-18 PROCEDURE — 25010000002 CEFEPIME PER 500 MG: Performed by: INTERNAL MEDICINE

## 2020-06-18 PROCEDURE — 84100 ASSAY OF PHOSPHORUS: CPT | Performed by: PHYSICIAN ASSISTANT

## 2020-06-18 PROCEDURE — 25010000002 VANCOMYCIN: Performed by: INTERNAL MEDICINE

## 2020-06-18 PROCEDURE — 25010000002 CEFTRIAXONE PER 250 MG: Performed by: PHYSICIAN ASSISTANT

## 2020-06-18 RX ADMIN — HEPARIN SODIUM 5000 UNITS: 5000 INJECTION INTRAVENOUS; SUBCUTANEOUS at 23:22

## 2020-06-18 RX ADMIN — SODIUM CHLORIDE, PRESERVATIVE FREE 10 ML: 5 INJECTION INTRAVENOUS at 10:39

## 2020-06-18 RX ADMIN — HEPARIN SODIUM 5000 UNITS: 5000 INJECTION INTRAVENOUS; SUBCUTANEOUS at 05:37

## 2020-06-18 RX ADMIN — METRONIDAZOLE 500 MG: 500 INJECTION, SOLUTION INTRAVENOUS at 05:37

## 2020-06-18 RX ADMIN — CEFTRIAXONE 2 G: 2 INJECTION, POWDER, FOR SOLUTION INTRAMUSCULAR; INTRAVENOUS at 14:19

## 2020-06-18 RX ADMIN — Medication 1 CAPSULE: at 10:38

## 2020-06-18 RX ADMIN — HEPARIN SODIUM 5000 UNITS: 5000 INJECTION INTRAVENOUS; SUBCUTANEOUS at 14:19

## 2020-06-18 RX ADMIN — SODIUM CHLORIDE, PRESERVATIVE FREE 10 ML: 5 INJECTION INTRAVENOUS at 23:22

## 2020-06-18 RX ADMIN — CEFEPIME 2 G: 2 INJECTION, POWDER, FOR SOLUTION INTRAVENOUS at 05:46

## 2020-06-18 RX ADMIN — INSULIN ASPART 2 UNITS: 100 INJECTION, SOLUTION INTRAVENOUS; SUBCUTANEOUS at 17:06

## 2020-06-18 RX ADMIN — PANTOPRAZOLE SODIUM 40 MG: 40 TABLET, DELAYED RELEASE ORAL at 05:38

## 2020-06-18 RX ADMIN — VANCOMYCIN HYDROCHLORIDE 1250 MG: 10 INJECTION, POWDER, LYOPHILIZED, FOR SOLUTION INTRAVENOUS at 10:39

## 2020-06-18 RX ADMIN — VANCOMYCIN HYDROCHLORIDE 1500 MG: 10 INJECTION, POWDER, LYOPHILIZED, FOR SOLUTION INTRAVENOUS at 23:22

## 2020-06-18 NOTE — PROGRESS NOTES
[facility] Medicine Services  PROGRESS NOTE     Patient Identification:  Name:  Davian Newell  Age:  60 y.o.  Sex:  male  :  1960  MRN:  [id number]  Visit Number:  [id number]  Primary Care Provider:  Provider, No Known    Length of stay:  2    ----------------------------------------------------------------------------------------------------------------------  Subjective     Chief Complaint:  Follow up for right foot abscess, diabetes     History of Presenting Illness/Hospital Course:  Patient is a 61 yo male with past medical history significant for recently diagnosed T2DM, recurrent right foot infection, and GERD that was admitted on 2020 secondary to right foot cellulitis/abscess with possible osteomyelitis. Please see admitting history and physical for further details. Patient was taken to the OR on 2020 per Dr. Daisy Gerard with podiatry and underwent incision and drainage of right foot abscess below the fascia, please see op note for details.  Wound culture from 2020 with a result of group B streptococcus.  Intraoperative wound culture pending.  Patient underwent MRI yesterday with no significant findings of osteomyelitis.  However, per podiatrist in setting of recurrent infection will treat as osteomyelitis.  Infectious diseases on board, appreciate the recommendations.    Subjective:  Today, the patient was resting in bed per my evaluation this morning.  Patient no acute distress.  Patient is of no complaints.  Patient was hoping to go home today but is agreeable to stay for IV antibiotic adjustments and infectious disease consultation.  Discussed findings of MRI, patient verbalized understanding.  Discussed possibility of long-term IV antibiotics, patient agreeable.  Patient denies any fevers or chills.  Denies any numbness, tingling, or paresthesias.  Pain is controlled.  Denies any abdominal pain, nausea, vomiting or change in bowel  movements.  Reports regular bowel movements.  Denies any urinary symptoms.  Has been ambulating with use of walker in room as well as crutches.  Denies any issues.    Present during exam: N/A   ----------------------------------------------------------------------------------------------------------------------  Objective     Consults:  • Podiatry   • Infectious disease    Procedures:  • 6/16/2020: Incision and drainage below fascia, right foot -- Dr. Daisy Gerard, podiatry   o Intraoperative wound cultures pending    Current Hospital Meds:    cefTRIAXone 2 g Intravenous Q24H   heparin (porcine) 5,000 Units Subcutaneous Q8H   insulin aspart 0-9 Units Subcutaneous TID AC   lactobacillus acidophilus 1 capsule Oral Daily   pantoprazole 40 mg Oral Q AM   sodium chloride 10 mL Intravenous Q12H   vancomycin 1,250 mg Intravenous Q12H        ----------------------------------------------------------------------------------------------------------------------  Vital Signs:  Temp:  [97.5 °F (36.4 °C)-98.9 °F (37.2 °C)] 97.5 °F (36.4 °C)  Heart Rate:  [52-81] 55  Resp:  [18] 18  BP: (111-145)/(60-73) 129/64    SpO2 Percentage    06/18/20 0640 06/18/20 0900 06/18/20 1056   SpO2: 98% 98% 98%     SpO2:  [97 %-98 %] 98 %  on  Flow (L/min):  [2] 2;   Device (Oxygen Therapy): room air    Body mass index is 24.27 kg/m².  Wt Readings from Last 3 Encounters:   06/16/20 76.7 kg (169 lb 2 oz)   06/05/20 81.6 kg (180 lb)   11/16/19 84.4 kg (186 lb)        Intake/Output Summary (Last 24 hours) at 6/18/2020 1225  Last data filed at 6/18/2020 0650  Gross per 24 hour   Intake 2609 ml   Output 400 ml   Net 2209 ml     Diet Regular; Consistent Carbohydrate  ----------------------------------------------------------------------------------------------------------------------  Physical exam:  Physical Exam   Constitutional: He is oriented to person, place, and time. He appears well-developed and well-nourished.  Non-toxic appearance. No distress.      Pleasant, sitting up on edge of bed, no acute distress noted.   HENT:   Head: Normocephalic and atraumatic.   Mouth/Throat: Mucous membranes are normal.   Eyes: Pupils are equal, round, and reactive to light. Conjunctivae are normal. No scleral icterus.   Neck: Normal range of motion. Neck supple. No muscular tenderness present.   Cardiovascular: Normal rate, regular rhythm, normal heart sounds and intact distal pulses. Exam reveals no gallop and no friction rub.   No murmur heard.  Pulses:       Dorsalis pedis pulses are 2+ on the left side. Right dorsalis pedis pulse not accessible.        Posterior tibial pulses are 2+ on the left side. Right posterior tibial pulse not accessible.   Unable to assess DP/PT of RLE d/t dressing. Cap refill is intact and less than 3 seconds.    Pulmonary/Chest: Effort normal and breath sounds normal. No accessory muscle usage. No tachypnea. No respiratory distress. He has no wheezes. He has no rhonchi. He has no rales. He exhibits no tenderness.   Abdominal: Soft. Bowel sounds are normal. He exhibits no distension. There is no tenderness. There is no rebound and no guarding.   Genitourinary:   Genitourinary Comments: No goodwin catheter in place.    Musculoskeletal: He exhibits no edema, tenderness or deformity.        Right lower leg: He exhibits no edema.        Left lower leg: He exhibits no edema.   RLE with ace wrap/bandage in place. No edema noted, no saturation of dressing, no erythema present. Cap refill intact. Skin on digits warm and dry.    Neurological: He is alert and oriented to person, place, and time. He has normal strength. No cranial nerve deficit or sensory deficit.   Awake and alert. Follows commands. Answers questions appropriately. Moves all extremities equally, strength and sensation intact. No focal neurological deficits on exam.    Skin: Skin is warm and dry. Capillary refill takes less than 2 seconds. No rash noted. No erythema. No pallor.   Psychiatric: He  has a normal mood and affect. His speech is normal and behavior is normal. Judgment and thought content normal. Cognition and memory are normal.   Nursing note and vitals reviewed.     ----------------------------------------------------------------------------------------------------------------------  Tele:    Sinus bradycardia 50s    I have personally reviewed the EKG/Telemetry strips   ----------------------------------------------------------------------------------------------------------------------            Results from last 7 days   Lab Units 06/18/20 0044 06/17/20 0427 06/16/20 1334 06/16/20  0934   CRP mg/dL  --   --   --  1.07*   LACTATE mmol/L  --   --  1.7  --    WBC 10*3/mm3 5.63 6.66  --  5.11   HEMOGLOBIN g/dL 11.7* 11.6*  --  13.4   HEMATOCRIT % 36.7* 37.0*  --  41.6   MCV fL 92.4 93.4  --  92.0   MCHC g/dL 31.9 31.4*  --  32.2   PLATELETS 10*3/mm3 419 416  --  496*     Results from last 7 days   Lab Units 06/18/20 0044 06/17/20 0427 06/16/20  1334   SODIUM mmol/L 137 136 137   POTASSIUM mmol/L 3.9 4.2 4.7   MAGNESIUM mg/dL 2.2 2.1  --    CHLORIDE mmol/L 106 106 104   CO2 mmol/L 22.2 21.9* 24.2   BUN mg/dL 13 15 19   CREATININE mg/dL 0.72* 0.81 0.93   EGFR IF NONAFRICN AM mL/min/1.73 111 97 83   CALCIUM mg/dL 8.6 8.6 8.9   GLUCOSE mg/dL 115* 125* 178*   ALBUMIN g/dL  --   --  3.07*   BILIRUBIN mg/dL  --   --  0.2   ALK PHOS U/L  --   --  77   AST (SGOT) U/L  --   --  16   ALT (SGPT) U/L  --   --  12   Estimated Creatinine Clearance: 118.4 mL/min (A) (by C-G formula based on SCr of 0.72 mg/dL (L)).    Glucose   Date/Time Value Ref Range Status   06/18/2020 1041 148 (H) 70 - 130 mg/dL Final   06/18/2020 0658 144 (H) 70 - 130 mg/dL Final   06/17/2020 2153 142 (H) 70 - 130 mg/dL Final   06/17/2020 1710 133 (H) 70 - 130 mg/dL Final   06/17/2020 1112 123 70 - 130 mg/dL Final   06/17/2020 0807 171 (H) 70 - 130 mg/dL Final   06/16/2020 2253 106 70 - 130 mg/dL Final   06/16/2020 1708 160 (H) 70 - 130  mg/dL Final     Lab Results   Component Value Date    HGBA1C 9.80 (H) 06/05/2020     Lab Results   Component Value Date    TSH 2.960 06/16/2020    FREET4 1.15 06/16/2020     Microbiology Results (last 10 days)     Procedure Component Value - Date/Time    Wound Culture - Swab, Foot, Right [[id number]] Collected:  06/16/20 1122    Lab Status:  Preliminary result Specimen:  Swab from Foot, Right Updated:  06/18/20 0648     Wound Culture No growth at 2 days     Gram Stain Rare (1+) WBCs seen      No organisms seen    Wound Culture - Wound, Foot, Right [[id number]] Collected:  06/16/20 1000    Lab Status:  Preliminary result Specimen:  Wound from Foot, Right Updated:  06/18/20 0651     Wound Culture No growth at 2 days     Gram Stain Few (2+) WBCs seen      No organisms seen    Blood Culture - Blood, Arm, Right [[id number]] Collected:  06/16/20 0934    Lab Status:  Preliminary result Specimen:  Blood from Arm, Right Updated:  06/18/20 0945     Blood Culture No growth at 2 days    Blood Culture - Blood, Arm, Left [[id number]] Collected:  06/16/20 0934    Lab Status:  Preliminary result Specimen:  Blood from Arm, Left Updated:  06/18/20 0945     Blood Culture No growth at 2 days    COVID PRE-OP / PRE-PROCEDURE SCREENING ORDER (NO ISOLATION) - Swab, Nasopharynx [[id number]] Collected:  06/12/20 1205    Lab Status:  Final result Specimen:  Swab from Nasopharynx Updated:  06/15/20 0748    Narrative:       The following orders were created for panel order COVID PRE-OP / PRE-PROCEDURE SCREENING ORDER (NO ISOLATION) - Swab, Nasopharynx.  Procedure                               Abnormality         Status                     ---------                               -----------         ------                     [facility], NP ...[[id number]]                      Final result                 Please view results for these tests on the individual orders.    [facility], NP SWAB IN [facility] MEDIA 24-30 HR TAT - Swab,  Nasopharynx [[id number]] Collected:  06/12/20 1205    Lab Status:  Final result Specimen:  Swab from Nasopharynx Updated:  06/15/20 0748     Reference Lab Report [facility]     COVID19 Not Detected           I have personally reviewed the above laboratory results.   ----------------------------------------------------------------------------------------------------------------------  Imaging Results (Last 24 Hours)     Procedure Component Value Units Date/Time    US Ankle / Brachial Indices Extremity Complete [[id number]] Collected:  06/17/20 0946     Updated:  06/17/20 0949    Narrative:       FINDINGS:  Right: Right index brachial pressure of 128 mmHg.  Posterior tibialis arterial pressure of 150 mmHg for an  index of 1.07.  Dorsalis pedis artery  pressure of 164 mmHg for an  index of 1.17.  Left: Left index brachial pressure of 140 mmHg.  Posterior tibialis artery pressure of 168 mmHg for an  index of 1.20.  Dorsalis pedis artery pressure of 167 mmHg for an  index of 1.19.    Impression:       Composite right ankle brachial index of 1.17and left of  1.20.     This report was finalized on 6/17/2020 9:47 AM by Dr. Mayito Baca MD.            I have personally reviewed the above radiology results.   ----------------------------------------------------------------------------------------------------------------------  Assessment/Plan     Active Hospital Problems    Diagnosis POA   • **Abscess of right foot [L02.611] Yes     Added automatically from request for surgery [id number]     • T2DM (type 2 diabetes mellitus) (CMS/[facility]) [E11.9] Yes   • GERD (gastroesophageal reflux disease) [K21.9] Yes   • Hypoalbuminemia [E88.09] Yes     · Right foot abscess with cellulitis, rule out osteomyelitis: Blood cultures with NGTD. Wound cx from 6/5/2020 with growth of group B strep. Intraoperative wound culture pending. Appreciate podiatry input/assistance.  MRI without significant changes of osteomyelitis, however per podiatry will  proceed with treating as osteomyelitis secondary to recurrent infection.  Infectious disease consult placed, IV antibiotics have been adjusted to high-dose IV Rocephin and pharmacy to dose vancomycin.  Discussed with patient, he is agreeable.  PICC line consult placed.  Continue lactobacillus for gut protection.  Continue wound care.  ABIs reviewed without significant peripheral arterial disease.  · Asymptomatic bradycardia: Monitor on tele.   · Recently diagnosed type II diabetes mellitus: Hemoglobin A1C 9.8. Blood glucose levels adequately controlled. Continue current SSI dose, titrate as necessary. Monitor blood glucose levels closely with accuchecks.   · Mild normocytic anemia: No active bleeding, no hematoma. Closely monitor H&H, transfuse if necessary. Daily CBC.   · GERD: PPI   · Hypoalbuminemia: Likely multifactorial. Monitor closely, repeat chem panel in AM.     --------------------------------------------------  DVT Prophylaxis: SQ heparin   GI Prophylaxis: PPI   FEN: No IV fluids. Replace electrolytes per protocol as necessary. Consistent carbohydrate diet.   Activity: Up with assistance as tolerated   Disposition: Pending MRI, continues on IV abx  --------------------------------------------------    The patient is high risk due to the following diagnoses/reasons:  Right foot abscess, ? osteomyelitis, recently dx T2DM    I have discussed the patient's assessment and plan with the patient, AM POLO Summers, and attending physician Dr. Sutton.       Paulina Yip PA-C  Hospitalist Service -- [facility]   Pager: [phone]    06/18/20  12:25    Attending Physician: Andriy Sutton MD    ----------------------------------------------------------------------------------------------------------------------

## 2020-06-18 NOTE — CONSULTS
INFECTIOUS DISEASE CONSULTATION REPORT        Patient Identification:  Name:  Davian Newell  Age:  60 y.o.  Sex:  male  :  1960  MRN:  [id number]   Visit Number:  [id number]  Primary Care Physician:  Provider, No Known       LOS: 2 days        Subjective       Subjective     History of present illness:      Thank you Dr. Sutton for allowing us to participate in the care of your patient.  As you well know, Mr. Davian Newell is a 60 y.o. male with past medical history significant for diabetes mellitus, who presented to [facility] Emergency Department on 2020 for cellulitis and possible osteomyelitis.  Patient originally presented to outpatient surgery on 2020 with Dr. Gerard.  Recently hospitalized from  through 2020 with right foot cellulitis hyperglycemia with newly diagnosed diabetes mellitis.  Discharged home on Augmentin and doxycycline which he reports he completed.  He had a follow-up with Dr. Gerard on 2020 with purulent drainage and suspected abscess noted.  Scheduled for incision and drainage on 2020 and feel he would benefit from admission for IV antibiotic therapy and a bone scan to rule out underlying osteomyelitis.  Wound culture from 2020 showing growth of group B strep with intraoperative cultures from 2020 in progress.  CRP low at 1.07 with normal white count. Denies any fever, chills, nausea, vomiting or diarrhea.       Infectious Disease consultation was requested for antimicrobial management.      ---------------------------------------------------------------------------------------------------------------------     Review Of Systems:    Constitutional: no fever, chills and night sweats. No appetite change or unexpected weight change. No fatigue.  Eyes: no eye drainage, itching or redness.  HEENT: no mouth sores, dysphagia or nose bleed.  Respiratory: no for shortness of breath, cough or production of sputum.  Cardiovascular: no  chest pain, no palpitations, no orthopnea.  Gastrointestinal: no nausea, vomiting or diarrhea. No abdominal pain, hematemesis or rectal bleeding.  Genitourinary: no dysuria or polyuria.  Hematologic/lymphatic: no lymph node abnormalities, no easy bruising or easy bleeding.  Musculoskeletal: See HPI  Skin: See HPI  Neurological: no loss of consciousness, no seizure, no headache.  Behavioral/Psych: no depression or suicidal ideation.  Endocrine: no hot flashes.  Immunologic: negative.    ---------------------------------------------------------------------------------------------------------------------     Past Medical History    Past Medical History:   Diagnosis Date   • Arthritis    • Diabetes mellitus (CMS/[facility])    • GERD (gastroesophageal reflux disease) 6/17/2020   • Heartburn        Past Surgical History    Past Surgical History:   Procedure Laterality Date   • ANKLE ARTHROSCOPY     • COLONOSCOPY     • FRACTURE SURGERY     • LEG EXCISION LESION/CYST Right 6/16/2020    Procedure: INCISION AND DRAINAGE LOWER EXTREMITY;  Surgeon: Daisy Gerard DPM;  Location: [facility];  Service: Podiatry;  Laterality: Right;       Family History    History reviewed. No pertinent family history.    Social History    Social History     Tobacco Use   • Smoking status: Never Smoker   • Smokeless tobacco: Never Used   Substance Use Topics   • Alcohol use: Yes   • Drug use: No       Allergies    Patient has no known allergies.  ---------------------------------------------------------------------------------------------------------------------     Home Medications:    Prior to Admission Medications     Prescriptions Last Dose Informant Patient Reported? Taking?    metFORMIN (GLUCOPHAGE) 500 MG tablet 6/16/2020  No Yes    Take 1 tablet by mouth 2 (Two) Times a Day With Meals.        ---------------------------------------------------------------------------------------------------------------------    Objective       Objective          [facility] Scheduled Meds:    cefepime 2 g Intravenous Q8H   heparin (porcine) 5,000 Units Subcutaneous Q8H   insulin aspart 0-9 Units Subcutaneous TID AC   lactobacillus acidophilus 1 capsule Oral Daily   metroNIDAZOLE 500 mg Intravenous Q8H   pantoprazole 40 mg Oral Q AM   sodium chloride 10 mL Intravenous Q12H   vancomycin 1,250 mg Intravenous Q12H        ---------------------------------------------------------------------------------------------------------------------   Vital Signs:  Temp:  [97.5 °F (36.4 °C)-98.9 °F (37.2 °C)] 97.5 °F (36.4 °C)  Heart Rate:  [52-81] 55  Resp:  [18] 18  BP: (111-145)/(60-73) 129/64  No data found.  SpO2 Percentage    06/18/20 0640 06/18/20 0900 06/18/20 1056   SpO2: 98% 98% 98%     SpO2:  [97 %-98 %] 98 %  on  Flow (L/min):  [2] 2;   Device (Oxygen Therapy): room air    Body mass index is 24.27 kg/m².  Wt Readings from Last 3 Encounters:   06/16/20 76.7 kg (169 lb 2 oz)   06/05/20 81.6 kg (180 lb)   11/16/19 84.4 kg (186 lb)     ---------------------------------------------------------------------------------------------------------------------     Physical Exam:    Constitutional:  Well-developed and well-nourished.  No respiratory distress.      HENT:  Head: Normocephalic and atraumatic.  Mouth:  Moist mucous membranes.    Eyes:  Conjunctivae and EOM are normal.  No scleral icterus.  Neck:  Neck supple.  No JVD present.    Cardiovascular:  Normal rate, regular rhythm and normal heart sounds with no murmur. No edema.  Pulmonary/Chest:  No respiratory distress, no wheezes, no crackles, with normal breath sounds and good air movement.  Abdominal:  Soft.  Bowel sounds are normal.  No distension and no tenderness.   Musculoskeletal: Right foot in surgical dressing  Neurological:  Alert and oriented to person, place, and time.  No facial droop.  No slurred speech.   Skin:  Skin is warm and dry.  No rash noted.  No pallor.   Psychiatric:  Normal mood and affect.  Behavior is  normal.    ---------------------------------------------------------------------------------------------------------------------              Results from last 7 days   Lab Units 06/18/20 0044 06/17/20 0427 06/16/20  1334 06/16/20  0934   CRP mg/dL  --   --   --  1.07*   LACTATE mmol/L  --   --  1.7  --    WBC 10*3/mm3 5.63 6.66  --  5.11   HEMOGLOBIN g/dL 11.7* 11.6*  --  13.4   HEMATOCRIT % 36.7* 37.0*  --  41.6   MCV fL 92.4 93.4  --  92.0   MCHC g/dL 31.9 31.4*  --  32.2   PLATELETS 10*3/mm3 419 416  --  496*     Results from last 7 days   Lab Units 06/18/20 0044 06/17/20 0427 06/16/20  1334   SODIUM mmol/L 137 136 137   POTASSIUM mmol/L 3.9 4.2 4.7   MAGNESIUM mg/dL 2.2 2.1  --    CHLORIDE mmol/L 106 106 104   CO2 mmol/L 22.2 21.9* 24.2   BUN mg/dL 13 15 19   CREATININE mg/dL 0.72* 0.81 0.93   EGFR IF NONAFRICN AM mL/min/1.73 111 97 83   CALCIUM mg/dL 8.6 8.6 8.9   GLUCOSE mg/dL 115* 125* 178*   ALBUMIN g/dL  --   --  3.07*   BILIRUBIN mg/dL  --   --  0.2   ALK PHOS U/L  --   --  77   AST (SGOT) U/L  --   --  16   ALT (SGPT) U/L  --   --  12   Estimated Creatinine Clearance: 118.4 mL/min (A) (by C-G formula based on SCr of 0.72 mg/dL (L)).  No results found for: AMMONIA    Glucose   Date/Time Value Ref Range Status   06/18/2020 1041 148 (H) 70 - 130 mg/dL Final   06/18/2020 0658 144 (H) 70 - 130 mg/dL Final   06/17/2020 2153 142 (H) 70 - 130 mg/dL Final   06/17/2020 1710 133 (H) 70 - 130 mg/dL Final   06/17/2020 1112 123 70 - 130 mg/dL Final   06/17/2020 0807 171 (H) 70 - 130 mg/dL Final   06/16/2020 2253 106 70 - 130 mg/dL Final   06/16/2020 1708 160 (H) 70 - 130 mg/dL Final     Lab Results   Component Value Date    HGBA1C 9.80 (H) 06/05/2020     Lab Results   Component Value Date    TSH 2.960 06/16/2020    FREET4 1.15 06/16/2020       Blood Culture   Date Value Ref Range Status   06/16/2020 No growth at 2 days  Preliminary   06/16/2020 No growth at 2 days  Preliminary     No results found for:  URINECX  Wound Culture   Date Value Ref Range Status   06/16/2020 No growth at 2 days  Preliminary   06/16/2020 No growth at 2 days  Preliminary     No results found for: STOOLCX  No results found for: RESPCX  Pain Management Panel     There is no flowsheet data to display.        I have personally reviewed the above laboratory results.   ---------------------------------------------------------------------------------------------------------------------  Imaging Results (Last 7 Days)     Procedure Component Value Units Date/Time    MRI Foot Right With Contrast [[id number]] Collected:  06/18/20 0755     Updated:  06/18/20 0800    Narrative:       EXAMINATION: MRI FOOT RIGHT W CONTRAST-      CLINICAL INDICATION:     Osteomyelitis suspected, foot swelling,  diabetic; L02.611-Cutaneous abscess of right foot     TECHNIQUE:  MRI FOOT RIGHT W CONTRAST-      COMPARISON: NONE      FINDINGS:   No soft tissue abscess identified. Soft tissue edema noted in region of  the fifth metatarsal head. Very subtle cortical edema of the fifth  metatarsal head versus some region of fat suppression loss without  definite marrow signal changes to suggest osteomyelitis at this time.  Muscles and tendons appear unremarkable. Other bones appear  unremarkable.    Impression:       No soft tissue abscess identified. Soft tissue edema noted  in region of the fifth metatarsal head. Very subtle cortical edema of  the fifth metatarsal head versus some region of fat suppression loss  without definite marrow signal changes to suggest osteomyelitis at this  time.     This report was finalized on 6/18/2020 7:58 AM by Dr. Mayito Baca MD.       US Ankle / Brachial Indices Extremity Complete [[id number]] Collected:  06/17/20 0946     Updated:  06/17/20 0949    Narrative:       EXAMINATION: US ANKLE / BRACHIAL INDICES EXTREMITY COMPLETE-      Technique: Ankle/brachial index pressures performed by noninvasive  vascular laboratory.     COMPARISON:    None  available.     CLINICAL INDICATION:    Peripheral vascular disease.     FINDINGS:     Right: Right index brachial pressure of 128 mmHg.     Posterior tibialis arterial pressure of 150 mmHg for an  index of 1.07.     Dorsalis pedis artery  pressure of 164 mmHg for an  index of 1.17.     Left: Left index brachial pressure of 140 mmHg.     Posterior tibialis artery pressure of 168 mmHg for an  index of 1.20.     Dorsalis pedis artery pressure of 167 mmHg for an  index of 1.19.       Impression:       Composite right ankle brachial index of 1.17and left of  1.20.     This report was finalized on 6/17/2020 9:47 AM by Dr. Mayito Baca MD.           I have personally reviewed the above radiology results.   ---------------------------------------------------------------------------------------------------------------------      Assessment & Plan        Assessment/Plan       ASSESSMENT:    1. Osteomyelitis of the right foot    PLAN:    Sp incision and drainage on 6/16/2020 and feel he would benefit from admission for IV antibiotic therapy and a bone scan to rule out underlying osteomyelitis.  Wound culture from 6/5/2020 showing growth of group B strep with intraoperative cultures from 6/16/2020 in progress.  CRP low at 1.07 with normal white count.  MRI of the right foot does not suggest osteomyelitis at this time as well as no soft tissue abscess.    Case discussed with Dr. Sutton and Dr. Gerard.  As there is concern for osteomyelitis with recurrent infection would recommend to treat as such for a prolonged course of 4 weeks.  Based on previous cultures would change antibiotic therapy to high-dose Rocephin 2 g IV every 24 hours and continue vancomycin per pharmacy dosing.    We will follow intraoperative culture results and adjust antibiotic therapy appropriately.  CRP ordered for a.m.    Again, thank you Dr. Sutton for allowing us to participate in the care of your patient and please feel free to call for any questions you  may have.        Code Status:   Code Status and Medical Interventions:   Ordered at: 06/16/20 0948     Code Status:    CPR     Medical Interventions (Level of Support Prior to Arrest):    Full           Maricruz Alston PA-C  06/18/20  11:39

## 2020-06-18 NOTE — PLAN OF CARE
Problem: Patient Care Overview  Goal: Plan of Care Review  Flowsheets (Taken 6/18/2020 8581)  Plan of Care Reviewed With: patient  Outcome Summary: Pt has had no new complaints this shift. Has been getting up with walker. Pt states he is not having pain. Will continue to monitor.

## 2020-06-18 NOTE — PROGRESS NOTES
PODIATRIC SURGERY PROGRESS NOTE     LOS: 2 days     Subjective     Interval History:     2 days s/p right foot incision and drainage. No complaints today.    Objective     Vital Signs  Temp:  [97.5 °F (36.4 °C)-98.9 °F (37.2 °C)] 97.5 °F (36.4 °C)  Heart Rate:  [52-81] 55  Resp:  [18] 18  BP: (111-145)/(60-73) 129/64    Labs & Rads    Imaging Results (Last 72 Hours)     Procedure Component Value Units Date/Time    MRI Foot Right With Contrast [[id number]] Collected:  06/18/20 0755     Updated:  06/18/20 0800    Narrative:       EXAMINATION: MRI FOOT RIGHT W CONTRAST-      CLINICAL INDICATION:     Osteomyelitis suspected, foot swelling,  diabetic; L02.611-Cutaneous abscess of right foot     TECHNIQUE:  MRI FOOT RIGHT W CONTRAST-      COMPARISON: NONE      FINDINGS:   No soft tissue abscess identified. Soft tissue edema noted in region of  the fifth metatarsal head. Very subtle cortical edema of the fifth  metatarsal head versus some region of fat suppression loss without  definite marrow signal changes to suggest osteomyelitis at this time.  Muscles and tendons appear unremarkable. Other bones appear  unremarkable.    Impression:       No soft tissue abscess identified. Soft tissue edema noted  in region of the fifth metatarsal head. Very subtle cortical edema of  the fifth metatarsal head versus some region of fat suppression loss  without definite marrow signal changes to suggest osteomyelitis at this  time.     This report was finalized on 6/18/2020 7:58 AM by Dr. Mayito Baca MD.       US Ankle / Brachial Indices Extremity Complete [[id number]] Collected:  06/17/20 0946     Updated:  06/17/20 0949    Narrative:       EXAMINATION: US ANKLE / BRACHIAL INDICES EXTREMITY COMPLETE-      Technique: Ankle/brachial index pressures performed by noninvasive  vascular laboratory.     COMPARISON:    None available.     CLINICAL INDICATION:    Peripheral vascular disease.     FINDINGS:     Right: Right index brachial pressure  of 128 mmHg.     Posterior tibialis arterial pressure of 150 mmHg for an  index of 1.07.     Dorsalis pedis artery  pressure of 164 mmHg for an  index of 1.17.     Left: Left index brachial pressure of 140 mmHg.     Posterior tibialis artery pressure of 168 mmHg for an  index of 1.20.     Dorsalis pedis artery pressure of 167 mmHg for an  index of 1.19.       Impression:       Composite right ankle brachial index of 1.17and left of  1.20.     This report was finalized on 6/17/2020 9:47 AM by Dr. Mayito Baca MD.           Results from last 7 days   Lab Units 06/18/20  0044 06/17/20  0427 06/16/20  0934   WBC 10*3/mm3 5.63 6.66 5.11   CRP mg/dL  --   --  1.07*     Lab Results   Component Value Date    NEUTROABS 2.71 06/18/2020     Results from last 7 days   Lab Units 06/18/20  0044   CREATININE mg/dL 0.72*       Blood Culture   Date Value Ref Range Status   06/16/2020 No growth at 24 hours  Preliminary   06/16/2020 No growth at 24 hours  Preliminary     No results found for: BCIDPCR, CXREFLEX, CSFCX, CULTURETIS  No results found for: CULTURES, HSVCX, URCX  No results found for: EYECULTURE, GCCX, LABHSV  No results found for: LEGIONELLA, MRSACX, MUMPSCX, MYCOPLASCX  No results found for: NOCARDIACX, STOOLCX  No results found for: THROATCX, UNSTIMCULT, URINECX, CULTURE, VZVCULTUR  Wound Culture   Date Value Ref Range Status   06/16/2020 No growth  Preliminary       Results Review:    I have personally reviewed laboratory data, culture results, radiology studies and antimicrobial therapy.      Physical Exam:  Sutures in lateral right foot. Minimal periwound erythema and edema, no warmth, no fluctuance. Minimal serosanguinous drainage. No malodor.     Results Review:     I reviewed the patient's new clinical results.      Assessment:  Principal Problem:    Abscess of right foot  Active Problems:    T2DM (type 2 diabetes mellitus) (CMS/[facility])    GERD (gastroesophageal reflux disease)    Hypoalbuminemia      Plan:  S/p  right foot incision and drainge 6/16/20.     -Dressing changed. Nursing to change dressing daily - betadine soaked guaze, continue upon discharge.  -DMITRY 6/17: mildly elevated, normal waveforms.    -MRI right foot 6/17: 5th metatarsal head subtle cortical edema without marrow changes  -Discussed case with Dr. Sutton and Dr. Douglas. There is concern for early osteomyelitis as well as recent history of recurrent abscess formation with cellulitis not resolving with PO antibiotics. Agree with a prolonged course of antibiotics based on previous admission culture results as current admission cultures negative likely due to recent antibiotics.  -Continue medical management per hospitalist  -Heel touch WB RLE.       Daisy Gerard DPM  06/18/20  12:11

## 2020-06-18 NOTE — PAYOR COMM NOTE
"Contact: Gladys Chong RN @ [facility]  Phone: [phone]  Fax: [phone]    Auth# [id number]  Clinical update       Davian Zazueta (60 y.o. Male)     Date of Birth Social Security Number Address Home Phone MRN    1960  [address] [id number] [phone] [id number]    Taoism Marital Status          [facility] Single       Admission Date Admission Type Admitting Provider Attending Provider Department, Room/Bed    6/16/20 Elective Daisy Gerard DPM Hacker, Bill J, MD [address], 3343/2S    Discharge Date Discharge Disposition Discharge Destination                       Attending Provider:  Andriy Sutton MD    Allergies:  No Known Allergies    Isolation:  None   Infection:  None   Code Status:  CPR    Ht:  177.8 cm (70\")   Wt:  76.7 kg (169 lb 2 oz)    Admission Cmt:  None   Principal Problem:  Abscess of right foot [L02.611] More...                 Active Insurance as of 6/16/2020     Primary Coverage     Payor Plan Insurance Group Employer/Plan Group    CIGNA MISC CIGNA [id number]     Coverage Address Coverage Phone Number Coverage Fax Number Effective Dates    [id number] [phone]  1/1/2018 - None Entered    [facility] [id number]       Subscriber Name Subscriber Birth Date Member ID       DAVIAN ZAZUETA 1960 [id number]                 Emergency Contacts      (Rel.) Home Phone Work Phone Mobile Phone    GRIS HALL (Sister) [phone] -- --               Physician Progress Notes (last 24 hours) (Notes from 06/17/20 1208 through 06/18/20 1208)      Daisy Gerard DPM at 06/17/20 1236          PODIATRIC SURGERY PROGRESS NOTE     LOS: 1 day     Subjective     Interval History:     1 day s/p right foot incision and drainage. Minimal right foot pain. No nausea, chills.    Objective     Vital Signs  Temp:  [97.5 °F (36.4 °C)-98.1 °F (36.7 °C)] 97.5 °F (36.4 °C)  Heart Rate:  [50-86] 57  Resp:  [16-18] 18  BP: (109-137)/(58-73) " 126/63    Labs & Rads    Imaging Results (Last 72 Hours)     Procedure Component Value Units Date/Time    US Ankle / Brachial Indices Extremity Complete [[id number]] Collected:  06/17/20 0946     Updated:  06/17/20 0949    Narrative:       EXAMINATION: US ANKLE / BRACHIAL INDICES EXTREMITY COMPLETE-      Technique: Ankle/brachial index pressures performed by noninvasive  vascular laboratory.     COMPARISON:    None available.     CLINICAL INDICATION:    Peripheral vascular disease.     FINDINGS:     Right: Right index brachial pressure of 128 mmHg.     Posterior tibialis arterial pressure of 150 mmHg for an  index of 1.07.     Dorsalis pedis artery  pressure of 164 mmHg for an  index of 1.17.     Left: Left index brachial pressure of 140 mmHg.     Posterior tibialis artery pressure of 168 mmHg for an  index of 1.20.     Dorsalis pedis artery pressure of 167 mmHg for an  index of 1.19.       Impression:       Composite right ankle brachial index of 1.17and left of  1.20.     This report was finalized on 6/17/2020 9:47 AM by Dr. Mayito Baca MD.           Results from last 7 days   Lab Units 06/17/20  0427 06/16/20  0934   WBC 10*3/mm3 6.66 5.11   CRP mg/dL  --  1.07*     Lab Results   Component Value Date    NEUTROABS 4.12 06/17/2020     Results from last 7 days   Lab Units 06/17/20  0427   CREATININE mg/dL 0.81       Blood Culture   Date Value Ref Range Status   06/16/2020 No growth at 24 hours  Preliminary   06/16/2020 No growth at 24 hours  Preliminary     No results found for: BCIDPCR, CXREFLEX, CSFCX, CULTURETIS  No results found for: CULTURES, HSVCX, URCX  No results found for: EYECULTURE, GCCX, LABHSV  No results found for: LEGIONELLA, MRSACX, MUMPSCX, MYCOPLASCX  No results found for: NOCARDIACX, STOOLCX  No results found for: THROATCX, UNSTIMCULT, URINECX, CULTURE, VZVCULTUR  Wound Culture   Date Value Ref Range Status   06/16/2020 No growth  Preliminary       Results Review:    I have personally reviewed  laboratory data, culture results, radiology studies and antimicrobial therapy.      Physical Exam:  Suture in lateral right foot with packing to proximal incision. Mild periwound erythema and edema, no warmth. Mild serosanguinous drainage. No malodor.     Results Review:     I reviewed the patient's new clinical results.      Assessment:  Principal Problem:    Abscess of right foot  Active Problems:    T2DM (type 2 diabetes mellitus) (CMS/[facility])    GERD (gastroesophageal reflux disease)    Hypoalbuminemia      Plan:  S/p right foot incision and drainge 20.     -Packing removed and new dressing applied.  -Cultures negative to date  -DMITRY : mildly elevated, normal waveforms.    -MRI pending to evaluate for osteomyelitis  -Continue medical management per hospitalist  -Heel touch WB RLE.       Daisy Gerard DPM  20  12:37      Electronically signed by Diasy Gerard DPM at 20 1316          Consult Notes (last 24 hours) (Notes from 20 1208 through 20 1208)      Maricruz Alston PA-C at 20 1138      Consult Orders    1. Inpatient Infectious Diseases Consult [[id number]] ordered by Andriy Sutton MD at 20 0819                        INFECTIOUS DISEASE CONSULTATION REPORT        Patient Identification:  Name:  Davian Newell  Age:  60 y.o.  Sex:  male  :  1960  MRN:  [id number]   Visit Number:  [id number]  Primary Care Physician:  Provider, No Known       LOS: 2 days        Subjective       Subjective     History of present illness:      Thank you Dr. Sutton for allowing us to participate in the care of your patient.  As you well know, Mr. Davian Newell is a 60 y.o. male with past medical history significant for diabetes mellitus, who presented to [facility] Emergency Department on 2020 for cellulitis and possible osteomyelitis.  Patient originally presented to outpatient surgery on 2020 with Dr. Gerard.  Recently hospitalized from   through June 7, 2020 with right foot cellulitis hyperglycemia with newly diagnosed diabetes mellitis.  Discharged home on Augmentin and doxycycline which he reports he completed.  He had a follow-up with Dr. Gerard on 6/12/2020 with purulent drainage and suspected abscess noted.  Scheduled for incision and drainage on 6/16/2020 and feel he would benefit from admission for IV antibiotic therapy and a bone scan to rule out underlying osteomyelitis.  Wound culture from 6/5/2020 showing growth of group B strep with intraoperative cultures from 6/16/2020 in progress.  CRP low at 1.07 with normal white count. Denies any fever, chills, nausea, vomiting or diarrhea.       Infectious Disease consultation was requested for antimicrobial management.      ---------------------------------------------------------------------------------------------------------------------     Review Of Systems:    Constitutional: no fever, chills and night sweats. No appetite change or unexpected weight change. No fatigue.  Eyes: no eye drainage, itching or redness.  HEENT: no mouth sores, dysphagia or nose bleed.  Respiratory: no for shortness of breath, cough or production of sputum.  Cardiovascular: no chest pain, no palpitations, no orthopnea.  Gastrointestinal: no nausea, vomiting or diarrhea. No abdominal pain, hematemesis or rectal bleeding.  Genitourinary: no dysuria or polyuria.  Hematologic/lymphatic: no lymph node abnormalities, no easy bruising or easy bleeding.  Musculoskeletal: See HPI  Skin: See HPI  Neurological: no loss of consciousness, no seizure, no headache.  Behavioral/Psych: no depression or suicidal ideation.  Endocrine: no hot flashes.  Immunologic: negative.    ---------------------------------------------------------------------------------------------------------------------     Past Medical History    Past Medical History:   Diagnosis Date   • Arthritis    • Diabetes mellitus (CMS/HCC)    • GERD (gastroesophageal  reflux disease) 6/17/2020   • Heartburn        Past Surgical History    Past Surgical History:   Procedure Laterality Date   • ANKLE ARTHROSCOPY     • COLONOSCOPY     • FRACTURE SURGERY     • LEG EXCISION LESION/CYST Right 6/16/2020    Procedure: INCISION AND DRAINAGE LOWER EXTREMITY;  Surgeon: Daisy Gerard DPM;  Location: [facility];  Service: Podiatry;  Laterality: Right;       Family History    History reviewed. No pertinent family history.    Social History    Social History     Tobacco Use   • Smoking status: Never Smoker   • Smokeless tobacco: Never Used   Substance Use Topics   • Alcohol use: Yes   • Drug use: No       Allergies    Patient has no known allergies.  ---------------------------------------------------------------------------------------------------------------------     Home Medications:    Prior to Admission Medications     Prescriptions Last Dose Informant Patient Reported? Taking?    metFORMIN (GLUCOPHAGE) 500 MG tablet 6/16/2020  No Yes    Take 1 tablet by mouth 2 (Two) Times a Day With Meals.        ---------------------------------------------------------------------------------------------------------------------    Objective       Objective     Hospital Scheduled Meds:    cefepime 2 g Intravenous Q8H   heparin (porcine) 5,000 Units Subcutaneous Q8H   insulin aspart 0-9 Units Subcutaneous TID AC   lactobacillus acidophilus 1 capsule Oral Daily   metroNIDAZOLE 500 mg Intravenous Q8H   pantoprazole 40 mg Oral Q AM   sodium chloride 10 mL Intravenous Q12H   vancomycin 1,250 mg Intravenous Q12H        ---------------------------------------------------------------------------------------------------------------------   Vital Signs:  Temp:  [97.5 °F (36.4 °C)-98.9 °F (37.2 °C)] 97.5 °F (36.4 °C)  Heart Rate:  [52-81] 55  Resp:  [18] 18  BP: (111-145)/(60-73) 129/64  No data found.  SpO2 Percentage    06/18/20 0640 06/18/20 0900 06/18/20 1056   SpO2: 98% 98% 98%     SpO2:  [97 %-98 %] 98 %   on  Flow (L/min):  [2] 2;   Device (Oxygen Therapy): room air    Body mass index is 24.27 kg/m².  Wt Readings from Last 3 Encounters:   06/16/20 76.7 kg (169 lb 2 oz)   06/05/20 81.6 kg (180 lb)   11/16/19 84.4 kg (186 lb)     ---------------------------------------------------------------------------------------------------------------------     Physical Exam:    Constitutional:  Well-developed and well-nourished.  No respiratory distress.      HENT:  Head: Normocephalic and atraumatic.  Mouth:  Moist mucous membranes.    Eyes:  Conjunctivae and EOM are normal.  No scleral icterus.  Neck:  Neck supple.  No JVD present.    Cardiovascular:  Normal rate, regular rhythm and normal heart sounds with no murmur. No edema.  Pulmonary/Chest:  No respiratory distress, no wheezes, no crackles, with normal breath sounds and good air movement.  Abdominal:  Soft.  Bowel sounds are normal.  No distension and no tenderness.   Musculoskeletal: Right foot in surgical dressing  Neurological:  Alert and oriented to person, place, and time.  No facial droop.  No slurred speech.   Skin:  Skin is warm and dry.  No rash noted.  No pallor.   Psychiatric:  Normal mood and affect.  Behavior is normal.    ---------------------------------------------------------------------------------------------------------------------              Results from last 7 days   Lab Units 06/18/20  0044 06/17/20  0427 06/16/20  1334 06/16/20  0934   CRP mg/dL  --   --   --  1.07*   LACTATE mmol/L  --   --  1.7  --    WBC 10*3/mm3 5.63 6.66  --  5.11   HEMOGLOBIN g/dL 11.7* 11.6*  --  13.4   HEMATOCRIT % 36.7* 37.0*  --  41.6   MCV fL 92.4 93.4  --  92.0   MCHC g/dL 31.9 31.4*  --  32.2   PLATELETS 10*3/mm3 419 416  --  496*     Results from last 7 days   Lab Units 06/18/20 0044 06/17/20 0427 06/16/20  1334   SODIUM mmol/L 137 136 137   POTASSIUM mmol/L 3.9 4.2 4.7   MAGNESIUM mg/dL 2.2 2.1  --    CHLORIDE mmol/L 106 106 104   CO2 mmol/L 22.2 21.9* 24.2   BUN  mg/dL 13 15 19   CREATININE mg/dL 0.72* 0.81 0.93   EGFR IF NONAFRICN AM mL/min/1.73 111 97 83   CALCIUM mg/dL 8.6 8.6 8.9   GLUCOSE mg/dL 115* 125* 178*   ALBUMIN g/dL  --   --  3.07*   BILIRUBIN mg/dL  --   --  0.2   ALK PHOS U/L  --   --  77   AST (SGOT) U/L  --   --  16   ALT (SGPT) U/L  --   --  12   Estimated Creatinine Clearance: 118.4 mL/min (A) (by C-G formula based on SCr of 0.72 mg/dL (L)).  No results found for: AMMONIA    Glucose   Date/Time Value Ref Range Status   06/18/2020 1041 148 (H) 70 - 130 mg/dL Final   06/18/2020 0658 144 (H) 70 - 130 mg/dL Final   06/17/2020 2153 142 (H) 70 - 130 mg/dL Final   06/17/2020 1710 133 (H) 70 - 130 mg/dL Final   06/17/2020 1112 123 70 - 130 mg/dL Final   06/17/2020 0807 171 (H) 70 - 130 mg/dL Final   06/16/2020 2253 106 70 - 130 mg/dL Final   06/16/2020 1708 160 (H) 70 - 130 mg/dL Final     Lab Results   Component Value Date    HGBA1C 9.80 (H) 06/05/2020     Lab Results   Component Value Date    TSH 2.960 06/16/2020    FREET4 1.15 06/16/2020       Blood Culture   Date Value Ref Range Status   06/16/2020 No growth at 2 days  Preliminary   06/16/2020 No growth at 2 days  Preliminary     No results found for: URINECX  Wound Culture   Date Value Ref Range Status   06/16/2020 No growth at 2 days  Preliminary   06/16/2020 No growth at 2 days  Preliminary     No results found for: STOOLCX  No results found for: RESPCX  Pain Management Panel     There is no flowsheet data to display.        I have personally reviewed the above laboratory results.   ---------------------------------------------------------------------------------------------------------------------  Imaging Results (Last 7 Days)     Procedure Component Value Units Date/Time    MRI Foot Right With Contrast [[id number]] Collected:  06/18/20 7005     Updated:  06/18/20 0800    Narrative:       EXAMINATION: MRI FOOT RIGHT W CONTRAST-      CLINICAL INDICATION:     Osteomyelitis suspected, foot  swelling,  diabetic; L02.611-Cutaneous abscess of right foot     TECHNIQUE:  MRI FOOT RIGHT W CONTRAST-      COMPARISON: NONE      FINDINGS:   No soft tissue abscess identified. Soft tissue edema noted in region of  the fifth metatarsal head. Very subtle cortical edema of the fifth  metatarsal head versus some region of fat suppression loss without  definite marrow signal changes to suggest osteomyelitis at this time.  Muscles and tendons appear unremarkable. Other bones appear  unremarkable.    Impression:       No soft tissue abscess identified. Soft tissue edema noted  in region of the fifth metatarsal head. Very subtle cortical edema of  the fifth metatarsal head versus some region of fat suppression loss  without definite marrow signal changes to suggest osteomyelitis at this  time.     This report was finalized on 6/18/2020 7:58 AM by Dr. Mayito Baca MD.       US Ankle / Brachial Indices Extremity Complete [[id number]] Collected:  06/17/20 0946     Updated:  06/17/20 0949    Narrative:       EXAMINATION: US ANKLE / BRACHIAL INDICES EXTREMITY COMPLETE-      Technique: Ankle/brachial index pressures performed by noninvasive  vascular laboratory.     COMPARISON:    None available.     CLINICAL INDICATION:    Peripheral vascular disease.     FINDINGS:     Right: Right index brachial pressure of 128 mmHg.     Posterior tibialis arterial pressure of 150 mmHg for an  index of 1.07.     Dorsalis pedis artery  pressure of 164 mmHg for an  index of 1.17.     Left: Left index brachial pressure of 140 mmHg.     Posterior tibialis artery pressure of 168 mmHg for an  index of 1.20.     Dorsalis pedis artery pressure of 167 mmHg for an  index of 1.19.       Impression:       Composite right ankle brachial index of 1.17and left of  1.20.     This report was finalized on 6/17/2020 9:47 AM by Dr. Mayito Baca MD.           I have personally reviewed the above radiology results.    ---------------------------------------------------------------------------------------------------------------------      Assessment & Plan        Assessment/Plan       ASSESSMENT:    1. Osteomyelitis of the right foot    PLAN:    Sp incision and drainage on 6/16/2020 and feel he would benefit from admission for IV antibiotic therapy and a bone scan to rule out underlying osteomyelitis.  Wound culture from 6/5/2020 showing growth of group B strep with intraoperative cultures from 6/16/2020 in progress.  CRP low at 1.07 with normal white count.  MRI of the right foot does not suggest osteomyelitis at this time as well as no soft tissue abscess.    Case discussed with Dr. Sutton and Dr. Gerard.  As there is concern for osteomyelitis with recurrent infection would recommend to treat as such for a prolonged course of 4 weeks.  Based on previous cultures would change antibiotic therapy to high-dose Rocephin 2 g IV every 24 hours and continue vancomycin per pharmacy dosing.    We will follow intraoperative culture results and adjust antibiotic therapy appropriately.  CRP ordered for a.m.    Again, thank you Dr. Sutton for allowing us to participate in the care of your patient and please feel free to call for any questions you may have.        Code Status:   Code Status and Medical Interventions:   Ordered at: 06/16/20 0948     Code Status:    CPR     Medical Interventions (Level of Support Prior to Arrest):    Full           Maricruz Alston PA-C  06/18/20  11:39      Electronically signed by Maricruz Alston PA-C at 06/18/20 1146       Only active medications are shown.   Scheduled Meds Sorted by Name   for NewellMarquiseDavian S as of 06/18/20 1208    Legend:                           Inactive     Active     Linked               Medications 06/18/20 06/19/20 06/20/20 06/21/20 06/22/20 06/23/20 06/24/20   cefTRIAXone (ROCEPHIN) 2 g/100 mL 0.9% NS VTB (PRAKASH)   Dose: 2 g  Freq: Every 24 Hours Route: IV  Indications of Use:  BONE AND/OR JOINT INFECTION,SKIN AND SOFT TISSUE INFECTION  Start: 06/18/20 1400 End: 07/16/20 1359    Admin Instructions:   Caution: Look alike/sound alike drug alert. Break seal and mix to activiate vial before use.    1400      1400      1400      1400      1400      1400      1400        heparin (porcine) 5000 UNIT/ML injection 5,000 Units   Dose: 5,000 Units  Freq: Every 8 Hours Scheduled Route: SC  Indications Comment: Prophylaxis of Venous Thromboembolism  Start: 06/16/20 1400    0537     1400     2200      0600     1400     2200      0600     1400     2200      0600     1400     2200      0600     1400     2200      0600     1400     2200      0600     1400     2200        insulin aspart (novoLOG) injection 0-9 Units   Dose: 0-9 Units  Freq: 3 Times Daily Before Meals Route: SC  Start: 06/16/20 1230    Admin Instructions:   Correction - Moderate Dose.  40-60 units/day total insulin dose or average weight, on oral agents    Blood glucose 150-199 mg/dL - 2 units  Blood glucose 200-249 mg/dL - 4 units  Blood glucose 250-299 mg/dL - 6 units  Blood glucose 300-349 mg/dL - 7 units  Blood glucose 350-400 mg/dL - 8 units  Blood glucose greater than 400 mg/dL - 9 units and call provider      (3705) [C]     (6637) [C]     1730      0730     1130     1730      0730     1130     1730      0730     1130     1730      0730     1130     1730      0730     1130     1730      0730     1130     1730        lactobacillus acidophilus (RISAQUAD) capsule 1 capsule   Dose: 1 capsule  Freq: Daily Route: PO  Start: 06/17/20 1800    1038      0900      0900      0900      0900      0900      0900        pantoprazole (PROTONIX) EC tablet 40 mg   Dose: 40 mg  Freq: Every Early Morning Route: PO  Start: 06/18/20 0600    Admin Instructions:   Swallow whole; do not crush, split, or chew.    0538      0600      0600      0600      0600      0600      0600        sodium chloride 0.9 % flush 10 mL   Dose: 10 mL  Freq: Every 12 Hours  Scheduled Route: IV  Start: 06/16/20 0950    1039     2100      0900 2100 0900 2100      0900     2100      0900 2100 0900 2100 0900 2100        vancomycin 1250 mg/250 mL 0.9% NS IVPB ([facility])   Dose: 1,250 mg  Freq: Every 12 Hours Route: IV  Indications of Use: SKIN AND SOFT TISSUE INFECTION  Start: 06/16/20 2200 End: 06/21/20 2159    1039     2200      1000     2200      1000     2200      1000     2159-D/C'd         Medications 06/18/20 06/19/20 06/20/20 06/21/20 06/22/20 06/23/20 06/24/20       Continuous Meds Sorted by Name   for Davian Newell as of 06/18/20 1208    Legend:                           Inactive     Active     Linked               Medications 06/18/20 06/19/20 06/20/20 06/21/20 06/22/20 06/23/20 06/24/20       PRN Meds Sorted by Name   for Davian Newell as of 06/18/20 1208    Legend:                           Inactive     Active     Linked               Medications 06/18/20 06/19/20 06/20/20 06/21/20 06/22/20 06/23/20 06/24/20   acetaminophen (TYLENOL) tablet 650 mg   Dose: 650 mg  Freq: Every 4 Hours PRN Route: PO  PRN Reason: Mild Pain   Start: 06/16/20 0946    Admin Instructions:   Do not exceed 4 grams of acetaminophen in a 24 hr period.    If given for pain, use the following pain scale:   Mild Pain = Pain Score of 1-3, CPOT 1-2  Moderate Pain = Pain Score of 4-6, CPOT 3-4  Severe Pain = Pain Score of 7-10, CPOT 5-8             Or  acetaminophen (TYLENOL) 160 MG/5ML solution 650 mg   Dose: 650 mg  Freq: Every 4 Hours PRN Route: PO  PRN Reason: Mild Pain   Start: 06/16/20 0946    Admin Instructions:   Do not exceed 4 grams of acetaminophen in a 24 hr period.    If given for pain, use the following pain scale:   Mild Pain = Pain Score of 1-3, CPOT 1-2  Moderate Pain = Pain Score of 4-6, CPOT 3-4  Severe Pain = Pain Score of 7-10, CPOT 5-8             Or  acetaminophen (TYLENOL) suppository 650 mg   Dose: 650 mg  Freq: Every 4 Hours PRN Route: RE  PRN Reason:  Mild Pain   Start: 06/16/20 0946    Admin Instructions:   Do not exceed 4 grams of acetaminophen in a 24 hr period.    If given for pain, use the following pain scale:   Mild Pain = Pain Score of 1-3, CPOT 1-2  Moderate Pain = Pain Score of 4-6, CPOT 3-4  Severe Pain = Pain Score of 7-10, CPOT 5-8             dextrose (D50W) 25 g/ 50mL Intravenous Solution 25 g   Dose: 25 g  Freq: Every 15 Minutes PRN Route: IV  PRN Reason: Low Blood Sugar  PRN Comment: Blood Sugar Less Than 70  Start: 06/16/20 0947    Admin Instructions:   Blood sugar less than 70; patient has IV access - Unresponsive, NPO or Unable To Safely Swallow             dextrose (GLUTOSE) oral gel 15 g   Dose: 15 g  Freq: Every 15 Minutes PRN Route: PO  PRN Reason: Low Blood Sugar  PRN Comment: Blood sugar less than 70  Start: 06/16/20 0947    Admin Instructions:   BS<70, Patient Alert, Is not NPO, Can safely swallow.             glucagon (human recombinant) (GLUCAGEN DIAGNOSTIC) injection 1 mg   Dose: 1 mg  Freq: Every 15 Minutes PRN Route: SC  PRN Reason: Low Blood Sugar  PRN Comment: Blood Glucose Less Than 70  Start: 06/16/20 0947    Admin Instructions:   Blood Glucose Less Than 70 - Patient Without IV Access - Unresponsive, NPO or Unable To Safely Swallow             nitroglycerin (NITROSTAT) SL tablet 0.4 mg   Dose: 0.4 mg  Freq: Every 5 Minutes PRN Route: SL  PRN Reason: Chest Pain  PRN Comment: Only if SBP Greater Than 100  Start: 06/17/20 0953    Admin Instructions:   If Pain Unrelieved After 3 Doses Notify MD             sodium chloride 0.9 % flush 10 mL   Dose: 10 mL  Freq: As Needed Route: IV  PRN Reason: Line Care  Start: 06/16/20 0940             Medications 06/18/20 06/19/20 06/20/20 06/21/20 06/22/20 06/23/20 06/24/20

## 2020-06-18 NOTE — PROGRESS NOTES
Patient continues on day 3 vancomycin. Vancomycin trough level was reported as 11 mg/L today. Based on this level, will increase vancomycin dose to 1500 mg IV q12h to target troughs of 15-20 for osteomyelitis. Will continue to follow and recheck a trough level when appropriate.    Thank you,    Radha Neri, PharmD

## 2020-06-18 NOTE — THERAPY EVALUATION
Acute Care - Physical Therapy Treatment Note  [facility]     Patient Name: Davian Newell  : 1960  MRN: [id number]  Today's Date: 2020  Onset of Illness/Injury or Date of Surgery: 20  Date of Referral to PT: 20  Referring Physician: Dr. Sutton    Admit Date: 2020    Visit Dx:    ICD-10-CM ICD-9-CM   1. Abscess of right foot L02.611 682.7     Patient Active Problem List   Diagnosis   • Cellulitis of foot, right   • Hyperglycemia   • Abscess of right foot   • T2DM (type 2 diabetes mellitus) (CMS/[facility])   • GERD (gastroesophageal reflux disease)   • Hypoalbuminemia       Therapy Treatment        Wound 20 1130 Right foot Incision (Active)   Dressing Appearance dry;intact 2020  9:40 PM   Closure KYLIE 2020  9:15 AM   Base dressing in place, unable to visualize 2020  9:15 AM   Dressing Care, Wound elastic bandage 2020  9:40 PM           Physical Therapy Education                 Title: PT OT SLP Therapies (Done)     Topic: Physical Therapy (Done)     Point: Mobility training (Done)     Description:   Instruct learner(s) on safety and technique for assisting patient out of bed, chair or wheelchair.  Instruct in the proper use of assistive devices, such as walker, crutches, cane or brace.              Patient Friendly Description:   It's important to get you on your feet again, but we need to do so in a way that is safe for you. Falling has serious consequences, and your personal safety is the most important thing of all.        When it's time to get out of bed, one of us or a family member will sit next to you on the bed to give you support.     If your doctor or nurse tells you to use a walker, crutches, a cane, or a brace, be sure you use it every time you get out of bed, even if you think you don't need it.    Learning Progress Summary           Patient Acceptance, E, VU by BC at 2020 1040                   Point: Home exercise program (Done)     Description:    Instruct learner(s) on appropriate technique for monitoring, assisting and/or progressing patient with therapeutic exercises and activities.              Learning Progress Summary           Patient Acceptance, E, VU by BC at 6/18/2020 1040                   Point: Body mechanics (Done)     Description:   Instruct learner(s) on proper positioning and spine alignment for patient and/or caregiver during mobility tasks and/or exercises.              Learning Progress Summary           Patient Acceptance, E, VU by BC at 6/18/2020 1040                   Point: Precautions (Done)     Description:   Instruct learner(s) on prescribed precautions during mobility and gait tasks              Learning Progress Summary           Patient Acceptance, E, VU by BC at 6/18/2020 1040                               User Key     Initials Effective Dates Name Provider Type [facility] 03/14/16 -  Beckie Freeman PT Physical Therapist PT                PT Recommendation and Plan     Plan of Care Reviewed With: patient     Time Calculation:   PT Charges     Row Name 06/18/20 1042             Time Calculation    PT Received On  06/18/20  -BC      PT Goal Re-Cert Due Date  07/02/20  -BC         Time Calculation- PT    Total Timed Code Minutes- PT  60 minute(s)  -BC         Timed Charges    12855 - Gait Training Minutes   15  -BC        User Key  (r) = Recorded By, (t) = Taken By, (c) = Cosigned By    Initials Name Provider Type    BC Beckie Freeman PT Physical Therapist        Therapy Charges for Today     Code Description Service Date Service Provider Modifiers Qty    [id number] HC GAIT TRAINING EA 15 MIN 6/18/2020 Beckie Freeman PT GP 1    [id number] HC PT EVAL MOD COMPLEXITY 3 6/18/2020 Beckie Freeman PT GP 1               Beckie Freeman PT  6/18/2020

## 2020-06-19 VITALS
WEIGHT: 169.13 LBS | HEIGHT: 70 IN | HEART RATE: 63 BPM | DIASTOLIC BLOOD PRESSURE: 69 MMHG | BODY MASS INDEX: 24.21 KG/M2 | RESPIRATION RATE: 18 BRPM | OXYGEN SATURATION: 97 % | TEMPERATURE: 97.7 F | SYSTOLIC BLOOD PRESSURE: 148 MMHG

## 2020-06-19 LAB
ANION GAP SERPL CALCULATED.3IONS-SCNC: 8.7 MMOL/L (ref 5–15)
BACTERIA SPEC AEROBE CULT: NORMAL
BACTERIA SPEC AEROBE CULT: NORMAL
BASOPHILS # BLD AUTO: 0.04 10*3/MM3 (ref 0–0.2)
BASOPHILS NFR BLD AUTO: 0.9 % (ref 0–1.5)
BUN BLD-MCNC: 12 MG/DL (ref 8–23)
BUN/CREAT SERPL: 17.1 (ref 7–25)
CALCIUM SPEC-SCNC: 8.7 MG/DL (ref 8.6–10.5)
CHLORIDE SERPL-SCNC: 106 MMOL/L (ref 98–107)
CO2 SERPL-SCNC: 23.3 MMOL/L (ref 22–29)
CREAT BLD-MCNC: 0.7 MG/DL (ref 0.76–1.27)
CRP SERPL-MCNC: 0.96 MG/DL (ref 0–0.5)
DEPRECATED RDW RBC AUTO: 40.6 FL (ref 37–54)
EOSINOPHIL # BLD AUTO: 0.12 10*3/MM3 (ref 0–0.4)
EOSINOPHIL NFR BLD AUTO: 2.6 % (ref 0.3–6.2)
ERYTHROCYTE [DISTWIDTH] IN BLOOD BY AUTOMATED COUNT: 12 % (ref 12.3–15.4)
GFR SERPL CREATININE-BSD FRML MDRD: 115 ML/MIN/1.73
GLUCOSE BLD-MCNC: 141 MG/DL (ref 65–99)
GLUCOSE BLDC GLUCOMTR-MCNC: 128 MG/DL (ref 70–130)
GLUCOSE BLDC GLUCOMTR-MCNC: 138 MG/DL (ref 70–130)
GRAM STN SPEC: NORMAL
HCT VFR BLD AUTO: 36.9 % (ref 37.5–51)
HGB BLD-MCNC: 11.8 G/DL (ref 13–17.7)
IMM GRANULOCYTES # BLD AUTO: 0.01 10*3/MM3 (ref 0–0.05)
IMM GRANULOCYTES NFR BLD AUTO: 0.2 % (ref 0–0.5)
LYMPHOCYTES # BLD AUTO: 1.72 10*3/MM3 (ref 0.7–3.1)
LYMPHOCYTES NFR BLD AUTO: 37 % (ref 19.6–45.3)
MAGNESIUM SERPL-MCNC: 2.1 MG/DL (ref 1.6–2.4)
MCH RBC QN AUTO: 29.4 PG (ref 26.6–33)
MCHC RBC AUTO-ENTMCNC: 32 G/DL (ref 31.5–35.7)
MCV RBC AUTO: 91.8 FL (ref 79–97)
MONOCYTES # BLD AUTO: 0.66 10*3/MM3 (ref 0.1–0.9)
MONOCYTES NFR BLD AUTO: 14.2 % (ref 5–12)
NEUTROPHILS # BLD AUTO: 2.1 10*3/MM3 (ref 1.7–7)
NEUTROPHILS NFR BLD AUTO: 45.1 % (ref 42.7–76)
NRBC BLD AUTO-RTO: 0 /100 WBC (ref 0–0.2)
PHOSPHATE SERPL-MCNC: 2.8 MG/DL (ref 2.5–4.5)
PLATELET # BLD AUTO: 407 10*3/MM3 (ref 140–450)
PMV BLD AUTO: 9.5 FL (ref 6–12)
POTASSIUM BLD-SCNC: 4 MMOL/L (ref 3.5–5.2)
RBC # BLD AUTO: 4.02 10*6/MM3 (ref 4.14–5.8)
SODIUM BLD-SCNC: 138 MMOL/L (ref 136–145)
WBC NRBC COR # BLD: 4.65 10*3/MM3 (ref 3.4–10.8)

## 2020-06-19 PROCEDURE — 99239 HOSP IP/OBS DSCHRG MGMT >30: CPT | Performed by: PHYSICIAN ASSISTANT

## 2020-06-19 PROCEDURE — 80048 BASIC METABOLIC PNL TOTAL CA: CPT | Performed by: PHYSICIAN ASSISTANT

## 2020-06-19 PROCEDURE — C1751 CATH, INF, PER/CENT/MIDLINE: HCPCS

## 2020-06-19 PROCEDURE — 86140 C-REACTIVE PROTEIN: CPT | Performed by: PHYSICIAN ASSISTANT

## 2020-06-19 PROCEDURE — 97165 OT EVAL LOW COMPLEX 30 MIN: CPT

## 2020-06-19 PROCEDURE — 02HV33Z INSERTION OF INFUSION DEVICE INTO SUPERIOR VENA CAVA, PERCUTANEOUS APPROACH: ICD-10-PCS | Performed by: INTERNAL MEDICINE

## 2020-06-19 PROCEDURE — 97530 THERAPEUTIC ACTIVITIES: CPT

## 2020-06-19 PROCEDURE — 25010000002 HEPARIN (PORCINE) PER 1000 UNITS: Performed by: INTERNAL MEDICINE

## 2020-06-19 PROCEDURE — 97116 GAIT TRAINING THERAPY: CPT

## 2020-06-19 PROCEDURE — 25010000002 DAPTOMYCIN PER 1 MG: Performed by: PHYSICIAN ASSISTANT

## 2020-06-19 PROCEDURE — 25010000002 CEFTRIAXONE PER 250 MG: Performed by: PHYSICIAN ASSISTANT

## 2020-06-19 PROCEDURE — 85025 COMPLETE CBC W/AUTO DIFF WBC: CPT | Performed by: PHYSICIAN ASSISTANT

## 2020-06-19 PROCEDURE — 82962 GLUCOSE BLOOD TEST: CPT

## 2020-06-19 PROCEDURE — 94799 UNLISTED PULMONARY SVC/PX: CPT

## 2020-06-19 PROCEDURE — 25010000002 VANCOMYCIN 5 G RECONSTITUTED SOLUTION: Performed by: INTERNAL MEDICINE

## 2020-06-19 PROCEDURE — 84100 ASSAY OF PHOSPHORUS: CPT | Performed by: PHYSICIAN ASSISTANT

## 2020-06-19 PROCEDURE — 83735 ASSAY OF MAGNESIUM: CPT | Performed by: PHYSICIAN ASSISTANT

## 2020-06-19 RX ORDER — SODIUM CHLORIDE 0.9 % (FLUSH) 0.9 %
10 SYRINGE (ML) INJECTION EVERY 12 HOURS SCHEDULED
Status: DISCONTINUED | OUTPATIENT
Start: 2020-06-19 | End: 2020-06-19 | Stop reason: HOSPADM

## 2020-06-19 RX ORDER — SODIUM CHLORIDE 0.9 % (FLUSH) 0.9 %
10 SYRINGE (ML) INJECTION AS NEEDED
Status: DISCONTINUED | OUTPATIENT
Start: 2020-06-19 | End: 2020-06-19 | Stop reason: HOSPADM

## 2020-06-19 RX ORDER — L.ACID,PARA/B.BIFIDUM/S.THERM 8B CELL
1 CAPSULE ORAL DAILY
Qty: 30 CAPSULE | Refills: 0 | Status: SHIPPED | OUTPATIENT
Start: 2020-06-20 | End: 2020-07-20

## 2020-06-19 RX ORDER — SODIUM CHLORIDE 0.9 % (FLUSH) 0.9 %
20 SYRINGE (ML) INJECTION AS NEEDED
Status: DISCONTINUED | OUTPATIENT
Start: 2020-06-19 | End: 2020-06-19 | Stop reason: HOSPADM

## 2020-06-19 RX ADMIN — DAPTOMYCIN 450 MG: 500 INJECTION, POWDER, LYOPHILIZED, FOR SOLUTION INTRAVENOUS at 13:46

## 2020-06-19 RX ADMIN — HEPARIN SODIUM 5000 UNITS: 5000 INJECTION INTRAVENOUS; SUBCUTANEOUS at 05:08

## 2020-06-19 RX ADMIN — SODIUM CHLORIDE, PRESERVATIVE FREE 10 ML: 5 INJECTION INTRAVENOUS at 09:25

## 2020-06-19 RX ADMIN — PANTOPRAZOLE SODIUM 40 MG: 40 TABLET, DELAYED RELEASE ORAL at 05:09

## 2020-06-19 RX ADMIN — Medication 1 CAPSULE: at 09:25

## 2020-06-19 RX ADMIN — VANCOMYCIN HYDROCHLORIDE 1500 MG: 10 INJECTION, POWDER, LYOPHILIZED, FOR SOLUTION INTRAVENOUS at 09:24

## 2020-06-19 RX ADMIN — SODIUM CHLORIDE 2 G: 900 INJECTION INTRAVENOUS at 15:13

## 2020-06-19 NOTE — DISCHARGE SUMMARY
[facility] Medicine Services  DISCHARGE SUMMARY    Patient Identification:  Name:  Davian Newell  Age:  60 y.o.  Sex:  male  :  1960  MRN:  [id number]  Visit Number:  [id number]    Date of Admission: 2020  Date of Discharge: 2020    PCP: Provider, No Known    Discharging Provider: Paulina Yip PA-C    Admission/Discharge Diagnoses     Discharge Diagnoses:  · Right foot abscess with cellulitis with signs of early osteomyelitis of fifth metatarsal head with recurrent infection  · Asymptomatic bradycardia, stable  · Recently diagnosed type 2 diabetes mellitus, non insulin-dependent with hemoglobin A1c 9.8  · Mild normocytic anemia, stable  · GERD    Consults/Procedures     Consults:   · Infectious disease -- Dr. Pagan   · Podiatry -- Dr. Daisy Gerard     Procedures Performed:  · 2020: Incision and drainage below fascia, right foot -- Dr. Daisy Gerard, podiatry   ? Intraoperative revealed no growth but recent wound culture from 2020 revealed growth of Group B streptococcus      History of Presenting Illness     Davian Newell is a 60 y.o. male with past medical history significant for recently diagnosed non-insulin-dependent type 2 diabetes mellitus, recurrent right foot infection, angered that was admitted from outpatient surgery for evaluation of right foot infection.  Please see the admitting history and physical for further details.  Patient had recently been diagnosed this facility 2020 through 2020 with right foot cellulitis and newly diagnosed type 2 diabetes mellitus.  Patient was discharged with Augmentin, doxycycline, and lactobacillus at that time.  Patient followed up with podiatrist Dr. Gerard on 2020 where he had suspected abscess in the right foot, patient was scheduled to undergo outpatient incision and drainage of the right foot on 2020 however it was felt that he would likely benefit from admission for IV antibiotics post procedure and  work-up for possible underlying osteomyelitis in setting of recurrent infection.     Hospital Course     Patient was admitted to the medical surgical floor for further evaluation and treatment.  Blood cultures were obtained and remain negative.  Wound culture from 6/5/2020 was reviewed that revealed growth of group B streptococcus.  Patient was taken to the OR and underwent incision and drainage below the fascia of the right foot, intraoperative wound cultures were obtained and revealed no growth at time of discharge.  Patient was continued on IV antibiotics empirically.  Patient did undergo MRI of the right foot that revealed soft tissue edema noted in the region of the fifth metatarsal head with very subtle cortical edema.  Per Dr. Gerard with podiatry, it was recommended to go ahead and treat the patient as if this were osteomyelitis in setting of recurrent infection.  As such, infectious disease was consulted and they evaluated the patient.  Patient's antibiotic regimen was adjusted appropriately.  PICC line was placed.  Per patient request, he would like to do his IV antibiotics at home with the aid of his sister who is a nurse at this facility.  Patient and family agreeable.    On day of discharge, it was felt that he had reached maximal inpatient benefit and was stable for discharge.  Patient was eager for discharge and agreeable to discharge plan.  IV antibiotics were sent to pharmacy, he will resume on 2 g IV Rocephin and IV daptomycin once daily through 7/14/2020 per infectious disease recommendations.  Lactobacillus to be continued for gut protection.  Patient will resume all of his home medications as previously prescribed.  Patient educated on signs or symptoms warranting emergent return to care.  Patient will continue daily wound care as prescribed per podiatrist, patient states his sister will help.  Patient was prescribed a rolling walker at time of discharge.  Patient is to continue heel touch  weightbearing status on the right lower extremity.  Patient will follow-up with podiatry in 1 to 2 weeks post discharge as well as establish with PCP for close follow-up.    Discharge Vitals/Physical Examination     Vital Signs:  Temp:  [97.7 °F (36.5 °C)-98.5 °F (36.9 °C)] 97.7 °F (36.5 °C)  Heart Rate:  [50-60] 60  Resp:  [18] 18  BP: (114-141)/(56-71) 114/67    SpO2 Percentage    06/19/20 0634 06/19/20 0930 06/19/20 1100   SpO2: 98% 98% 96%     SpO2:  [96 %-98 %] 96 %  on   ;   Device (Oxygen Therapy): room air    Body mass index is 24.27 kg/m².  Wt Readings from Last 3 Encounters:   06/16/20 76.7 kg (169 lb 2 oz)   06/05/20 81.6 kg (180 lb)   11/16/19 84.4 kg (186 lb)       Physical Exam:  Physical Exam   Constitutional: He is oriented to person, place, and time. He appears well-developed and well-nourished.  Non-toxic appearance. No distress.   Sitting up in bed, no acute distress noted.   HENT:   Head: Normocephalic and atraumatic.   Mouth/Throat: Mucous membranes are normal.   Eyes: Pupils are equal, round, and reactive to light. Conjunctivae are normal. No scleral icterus.   Neck: Normal range of motion. Neck supple.   Cardiovascular: Normal rate, regular rhythm, normal heart sounds and intact distal pulses. Exam reveals no gallop and no friction rub.   No murmur heard.  Pulses:       Dorsalis pedis pulses are 2+ on the right side, and 2+ on the left side.        Posterior tibial pulses are 2+ on the right side, and 2+ on the left side.   Pulmonary/Chest: Effort normal and breath sounds normal. No accessory muscle usage. No tachypnea. No respiratory distress. He has no wheezes. He has no rhonchi. He has no rales. He exhibits no tenderness.   Abdominal: Soft. Bowel sounds are normal. He exhibits no distension. There is no tenderness. There is no rebound and no guarding.   Genitourinary:   Genitourinary Comments: No goodwin catheter in place, making urine.    Musculoskeletal: He exhibits no edema, tenderness or  deformity.        Right lower leg: He exhibits no edema.        Left lower leg: He exhibits no edema.   Right foot wound with good healing.  No surrounding erythema.  No bleeding.  No purulent drainage.     Vascular Status -  His right foot exhibits normal foot vasculature . His left foot exhibits normal foot vasculature .  Neurological: He is alert and oriented to person, place, and time. He has normal strength. No cranial nerve deficit or sensory deficit.   Awake and alert.  Follows commands.  Answers questions probably.  Moves all extremities equally.  Strength and sensation intact.  No focal neurological deficits on exam.   Skin: Skin is warm and dry. Capillary refill takes less than 2 seconds. No rash noted. No erythema. No pallor.   Psychiatric: He has a normal mood and affect. His speech is normal and behavior is normal. Judgment and thought content normal. Cognition and memory are normal.   Nursing note and vitals reviewed.    Pertinent Laboratory/Radiology Results     Pertinent Laboratory Results:            Results from last 7 days   Lab Units 06/19/20  0538 06/18/20  0044 06/17/20  0427 06/16/20  1334 06/16/20  0934   CRP mg/dL 0.96*  --   --   --  1.07*   LACTATE mmol/L  --   --   --  1.7  --    WBC 10*3/mm3 4.65 5.63 6.66  --  5.11   HEMOGLOBIN g/dL 11.8* 11.7* 11.6*  --  13.4   HEMATOCRIT % 36.9* 36.7* 37.0*  --  41.6   MCV fL 91.8 92.4 93.4  --  92.0   MCHC g/dL 32.0 31.9 31.4*  --  32.2   PLATELETS 10*3/mm3 407 419 416  --  496*     Results from last 7 days   Lab Units 06/19/20  0538 06/18/20  0044 06/17/20  0427 06/16/20  1334   SODIUM mmol/L 138 137 136 137   POTASSIUM mmol/L 4.0 3.9 4.2 4.7   MAGNESIUM mg/dL 2.1 2.2 2.1  --    CHLORIDE mmol/L 106 106 106 104   CO2 mmol/L 23.3 22.2 21.9* 24.2   BUN mg/dL 12 13 15 19   CREATININE mg/dL 0.70* 0.72* 0.81 0.93   EGFR IF NONAFRICN AM mL/min/1.73 115 111 97 83   CALCIUM mg/dL 8.7 8.6 8.6 8.9   GLUCOSE mg/dL 141* 115* 125* 178*   ALBUMIN g/dL  --   --    --  3.07*   BILIRUBIN mg/dL  --   --   --  0.2   ALK PHOS U/L  --   --   --  77   AST (SGOT) U/L  --   --   --  16   ALT (SGPT) U/L  --   --   --  12   Estimated Creatinine Clearance: 121.7 mL/min (A) (by C-G formula based on SCr of 0.7 mg/dL (L)).    Glucose   Date/Time Value Ref Range Status   06/19/2020 1043 138 (H) 70 - 130 mg/dL Final   06/19/2020 0635 128 70 - 130 mg/dL Final   06/18/2020 2320 121 70 - 130 mg/dL Final   06/18/2020 1643 172 (H) 70 - 130 mg/dL Final   06/18/2020 1041 148 (H) 70 - 130 mg/dL Final   06/18/2020 0658 144 (H) 70 - 130 mg/dL Final   06/17/2020 2153 142 (H) 70 - 130 mg/dL Final   06/17/2020 1710 133 (H) 70 - 130 mg/dL Final     Lab Results   Component Value Date    HGBA1C 9.80 (H) 06/05/2020     Lab Results   Component Value Date    TSH 2.960 06/16/2020    FREET4 1.15 06/16/2020     Microbiology Results (last 10 days)     Procedure Component Value - Date/Time    Wound Culture - Swab, Foot, Right [[id number]] Collected:  06/16/20 1122    Lab Status:  Final result Specimen:  Swab from Foot, Right Updated:  06/19/20 0847     Wound Culture No growth at 3 days     Gram Stain Rare (1+) WBCs seen      No organisms seen    Wound Culture - Wound, Foot, Right [[id number]] Collected:  06/16/20 1000    Lab Status:  Final result Specimen:  Wound from Foot, Right Updated:  06/19/20 0756     Wound Culture Rare Normal Skin Winter     Gram Stain Few (2+) WBCs seen      No organisms seen    Blood Culture - Blood, Arm, Right [[id number]] Collected:  06/16/20 0934    Lab Status:  Preliminary result Specimen:  Blood from Arm, Right Updated:  06/19/20 0946     Blood Culture No growth at 3 days    Blood Culture - Blood, Arm, Left [[id number]] Collected:  06/16/20 0934    Lab Status:  Preliminary result Specimen:  Blood from Arm, Left Updated:  06/19/20 0946     Blood Culture No growth at 3 days    COVID PRE-OP / PRE-PROCEDURE SCREENING ORDER (NO ISOLATION) - Swab, Nasopharynx [[id number]] Collected:  06/12/20 1207     Lab Status:  Final result Specimen:  Swab from Nasopharynx Updated:  06/15/20 0748    Narrative:       The following orders were created for panel order COVID PRE-OP / PRE-PROCEDURE SCREENING ORDER (NO ISOLATION) - Swab, Nasopharynx.  Procedure                               Abnormality         Status                     ---------                               -----------         ------                     COVID-19,[facility], NP ...[[id number]]                      Final result                 Please view results for these tests on the individual orders.    COVID-19,[facility], NP SWAB IN [facility]EXAR SALINE MEDIA 24-30 HR TAT - Swab, Nasopharynx [[id number]] Collected:  06/12/20 1205    Lab Status:  Final result Specimen:  Swab from Nasopharynx Updated:  06/15/20 0748     Reference Lab Report [facility]     COVID19 Not Detected        Pertinent Radiology Results:  Imaging Results (All)     Procedure Component Value Units Date/Time    MRI Foot Right With Contrast [[id number]] Collected:  06/18/20 0755     Updated:  06/18/20 0800    Narrative:       FINDINGS:   No soft tissue abscess identified. Soft tissue edema noted in region of  the fifth metatarsal head. Very subtle cortical edema of the fifth  metatarsal head versus some region of fat suppression loss without  definite marrow signal changes to suggest osteomyelitis at this time.  Muscles and tendons appear unremarkable. Other bones appear  unremarkable.    Impression:       No soft tissue abscess identified. Soft tissue edema noted  in region of the fifth metatarsal head. Very subtle cortical edema of  the fifth metatarsal head versus some region of fat suppression loss  without definite marrow signal changes to suggest osteomyelitis at this  time.     This report was finalized on 6/18/2020 7:58 AM by Dr. Mayito Baca MD.     Ankle / Brachial Indices Extremity Complete [[id number]] Collected:  06/17/20 0946     Updated:  06/17/20 0949    Narrative:        FINDINGS:  Right: Right index brachial pressure of 128 mmHg.  Posterior tibialis arterial pressure of 150 mmHg for an  index of 1.07.  Dorsalis pedis artery  pressure of 164 mmHg for an  index of 1.17.  Left: Left index brachial pressure of 140 mmHg.  Posterior tibialis artery pressure of 168 mmHg for an  index of 1.20.  Dorsalis pedis artery pressure of 167 mmHg for an  index of 1.19.    Impression:       Composite right ankle brachial index of 1.17and left of  1.20.     This report was finalized on 6/17/2020 9:47 AM by Dr. Mayito Baca MD.     Test Results Pending at Discharge:   Order Current Status    Blood Culture - Blood, Arm, Left Preliminary result    Blood Culture - Blood, Arm, Right Preliminary result        Discharge Disposition/Discharge Medications/Discharge Appointments     Discharge Disposition:   Home or Self Care    Condition at Discharge:  Stable     DME Prescribed at Discharge:  Rolling walker     Discharge Diet:  Diet Instructions     Diet: Consistent Carbohydrate      Discharge Diet:  Consistent Carbohydrate        Discharge Activity:  Activity Instructions     Activity as Tolerated      Other Activity Instructions      Activity Instructions: Heel touch weight bearing RLE        Code Status While Inpatient:  Code Status and Medical Interventions:   Ordered at: 06/16/20 0948     Code Status:    CPR     Medical Interventions (Level of Support Prior to Arrest):    Full     Discharge Medications:     Discharge Medications      New Medications      Instructions Start Date   cefTRIAXone  Commonly known as:  ROCEPHIN   2 g, Intravenous, Every 24 Hours      DAPTOmycin 450 mg in sodium chloride 0.9 % 50 mL   6 mg/kg (450 mg), Intravenous, Every 24 Hours   Start Date:  June 20, 2020     lactobacillus acidophilus capsule capsule   1 capsule, Oral, Daily   Start Date:  June 20, 2020        Continue These Medications      Instructions Start Date   metFORMIN 500 MG tablet  Commonly known as:  GLUCOPHAGE    500 mg, Oral, 2 Times Daily With Meals           Discharge Appointments:  Additional Instructions for the Follow-ups that You Need to Schedule     Call MD With Problems / Concerns   As directed      Call PCP or return to the ED with recurrent or worsening symptoms.    Order Comments:  Call PCP or return to the ED with recurrent or worsening symptoms.          Discharge Follow-up with PCP   As directed       Currently Documented PCP:    Provider, No Known    PCP Phone Number:    [phone]     Follow Up Details:  1 week         Discharge Follow-up with Specified Provider: Dr. Daisy Gerard, podiatry; 1 Week   As directed      To:  Dr. Daisy Gerard, podiatry    Follow Up:  1 Week               Attestation: I personally discussed the patient's hospital course, disposition, discharge planning, and discharge medications with attending physician, Andriy Mercado MD, prior to time of discharge.       Paulina Yip PA-C  Hospitalist Service -- [facility]       06/19/20  15:08    Discharge Time: Greater than 30 minutes    Please send a copy of this dictation to the following providers:  Provider, No Known

## 2020-06-19 NOTE — THERAPY EVALUATION
Acute Care - Occupational Therapy Initial Evaluation   [place]     Patient Name: Davian Newell  : 1960  MRN: [id number]  Today's Date: 2020  Onset of Illness/Injury or Date of Surgery: 20     Referring Physician: Dr. Sutton    Admit Date: 2020       ICD-10-CM ICD-9-CM   1. Abscess of right foot L02.611 682.7     Patient Active Problem List   Diagnosis   • Cellulitis of foot, right   • Hyperglycemia   • Abscess of right foot   • T2DM (type 2 diabetes mellitus) (CMS/HCC)   • GERD (gastroesophageal reflux disease)   • Hypoalbuminemia     Past Medical History:   Diagnosis Date   • Arthritis    • Diabetes mellitus (CMS/HCC)    • GERD (gastroesophageal reflux disease) 2020   • Heartburn      Past Surgical History:   Procedure Laterality Date   • ANKLE ARTHROSCOPY     • COLONOSCOPY     • FRACTURE SURGERY     • LEG EXCISION LESION/CYST Right 2020    Procedure: INCISION AND DRAINAGE LOWER EXTREMITY;  Surgeon: Daisy Gerard DPM;  Location: [facility];  Service: Podiatry;  Laterality: Right;          OT ASSESSMENT FLOWSHEET (last 12 hours)      Occupational Therapy Evaluation     Row Name 20 1130                   OT Evaluation Time/Intention    Subjective Information  no complaints  -LM        Document Type  evaluation  -LM        Mode of Treatment  occupational therapy  -LM        Patient Effort  good  -LM           General Information    Onset of Illness/Injury or Date of Surgery  20  -LM        Prior Level of Function  independent:;all household mobility;ADL's;work  -LM        Equipment Currently Used at Home  crutches  -LM        Pertinent History of Current Functional Problem  Admitted with right food abcess.  PMH:  oa,gerd, dm  -LM        Existing Precautions/Restrictions  other (see comments);fall right foot abcess  -LM        Benefits Reviewed  improve function;increase independence;increase strength;increase balance;patient:  -LM           Relationship/Environment     Lives With  alone  -LM           Resource/Environmental Concerns    Current Living Arrangements  home/apartment/condo  -LM           Cognitive Assessment/Intervention- PT/OT    Orientation Status (Cognition)  oriented x 4  -LM        Follows Commands (Cognition)  WFL  -LM           ADL Assessment/Intervention    BADL Assessment/Intervention  bathing;upper body dressing;lower body dressing;grooming;feeding;toileting  -LM           Bathing Assessment/Intervention    Bathing [place] Level  set up  -LM           Upper Body Dressing Assessment/Training    Upper Body Dressing [place] Level  set up  -LM           Lower Body Dressing Assessment/Training    Lower Body Dressing [place] Level  set up  -LM           Grooming Assessment/Training    Tyler Level (Grooming)  independent  -LM           Self-Feeding Assessment/Training    Tyler Level (Feeding)  independent  -LM           Toileting Assessment/Training    Tyler Level (Toileting)  conditional independence  -LM           General ROM    GENERAL ROM COMMENTS  bue arom wfl  -LM           MMT (Manual Muscle Testing)    General MMT Comments  bue strength 4/5  -LM           Positioning and Restraints    Post Treatment Position  bed  -LM        In Bed  call light within reach;encouraged to call for assist  -LM           Wound 06/16/20 1130 Right foot Incision    Wound - Properties Group Date first assessed: 06/16/20  -CE Time first assessed: 1130  -CE Side: Right  -CE Location: foot  -CE Primary Wound Type: Incision  -CE       Plan of Care Review    Plan of Care Reviewed With  patient  -LM           Clinical Impression (OT)    OT Diagnosis  debility  -LM        Patient/Family Goals Statement (OT Eval)  return home to of  -LM        Criteria for Skilled Therapeutic Interventions Met (OT Eval)  yes  -LM        Rehab Potential (OT Eval)  good, to achieve stated therapy goals  -LM        Therapy Frequency (OT Eval)  evaluation only performing  badl and mobility with MOD ind  -        Care Plan Review (OT)  evaluation/treatment results reviewed;patient/other agree to care plan  -LM        Anticipated Discharge Disposition (OT)  home  -LM           Planned OT Interventions    Planned Therapy Interventions (OT Eval)  BADL retraining;activity tolerance training;patient/caregiver education/training;ROM/therapeutic exercise;strengthening exercise;transfer/mobility retraining  -          User Key  (r) = Recorded By, (t) = Taken By, (c) = Cosigned By    Initials Name Effective Dates    CE Delonte Way RN 06/16/16 -     LM Niesha Polk, OT 03/14/16 -                OT Recommendation and Plan  Outcome Summary/Treatment Plan (OT)  Anticipated Discharge Disposition (OT): home  Planned Therapy Interventions (OT Eval): BADL retraining, activity tolerance training, patient/caregiver education/training, ROM/therapeutic exercise, strengthening exercise, transfer/mobility retraining  Therapy Frequency (OT Eval): evaluation only(performing badl and mobility with MOD ind)  Plan of Care Review  Plan of Care Reviewed With: patient  Plan of Care Reviewed With: patient        Time Calculation:   Time Calculation- OT     Row Name 06/19/20 1405 06/19/20 1034          Time Calculation- OT    Total Timed Code Minutes- OT  60 minute(s)  -LM  --        Timed Charges    94928 - Gait Training Minutes   --  15  -BC       User Key  (r) = Recorded By, (t) = Taken By, (c) = Cosigned By    Initials Name Provider Type    BC Beckie Freeman, PT Physical Therapist    LM Niesha Polk, OT Occupational Therapist        Therapy Charges for Today     Code Description Service Date Service Provider Modifiers Qty    [id number]  OT EVAL LOW COMPLEXITY 4 6/19/2020 Niesha Polk, OT GO 1               Niesha Polk OT  6/19/2020

## 2020-06-19 NOTE — DISCHARGE INSTR - APPOINTMENTS
Follow-up with Dr Raymundo in the [place] office on 6/23/20 at 10:45 am.  Office # [phone] if you have any questions.

## 2020-06-19 NOTE — DISCHARGE PLACEMENT REQUEST
"Davian Zazueta (60 y.o. Male)     Date of Birth Social Security Number Address Home Phone MRN    1960  [address] [place], [place] [address] [phone] [id number]    Jew Marital Status          [facility] Single       Admission Date Admission Type Admitting Provider Attending Provider Department, Room/Bed    20 Elective Daisy Gerard DPM Hacker, Bill J, MD [address], 3343/2S    Discharge Date Discharge Disposition Discharge Destination         Home or Self Care              Attending Provider:  Andriy Sutton MD    Allergies:  No Known Allergies    Isolation:  None   Infection:  None   Code Status:  CPR    Ht:  177.8 cm (70\")   Wt:  76.7 kg (169 lb 2 oz)    Admission Cmt:  None   Principal Problem:  Abscess of right foot [L02.611] More...                 Active Insurance as of 2020     Primary Coverage     Payor Plan Insurance Group Employer/Plan Group    CIGNA MISC CIGNA [id number]     Coverage Address Coverage Phone Number Coverage Fax Number Effective Dates    [address] [phone]  2018 - None Entered    [facility] [address]       Subscriber Name Subscriber Birth Date Member ID       DAVIAN ZAZUETA 1960 [id number]                 Emergency Contacts      (Rel.) Home Phone Work Phone Mobile Phone    GRIS HALL (Sister) [phone] -- --        [address] [address]  Dept. Phone:  [phone]  Dept. Fax:   Date Ordered: 2020         Patient:  Davian Zazueta MRN:  [id number]   [address] [facility] [address] :  1960  SSN:    Phone: [phone] Sex:  M     Weight: 76.7 kg (169 lb 2 oz)         Ht Readings from Last 1 Encounters:   20 177.8 cm (70\")         Walker               (Order ID: [id number])    Diagnosis:  Abscess of right foot (L02.611 [ICD-10-CM] 682.7 [ICD-9-CM])   Quantity:  1     Equipment:  Walker Folding with Wheels  Length of " "Need (99 Months = Lifetime): 99 Months = Lifetime        Authorizing Provider's Phone: [phone]   Authorizing Provider:Paulina Yip PA  Authorizing Provider's NPI: [id number]  Order Entered By: Paulina Yip PA 6/19/2020  1:37 PM     Electronically signed by: Paulina Yip PA 6/19/2020  1:37 PM               History & Physical      Jayleen Knutson PA-C at 06/16/20 1100     Attestation signed by Andriy Sutton MD at 06/16/20 1875    I have reviewed and agree with the above documentation.     Mr. Newell is our 59 yo M with hx of recently diagnosed DM, GERD who presented to podiatry today for recurrent right foot infection. Patient noted taking scab off after his last admission and not being sterile including getting \"lake water\" in the wound from [facility]. He reports since then it has got red warm and drainage has increased. He was recently admitted to our facility and was started on treatment for newly diagnosed DM II with metformin. He was also sent home with a week of Augmentin and doxycyline. Hospital medicine was called for admission after surgical debridement by podiatry for treatment of recurrent foot infection that failed outpatient treatment.     Exam:  Gen: NAD, resting supine in hospital bed.   HEENT: Neck supple. No JVD. Mucosa moist.   CV: RRR. No M/R/G  Pulm: CTAB, no wheezes or crackles   MSK: No joint swelling or redness   GI: Abdomen soft, nontender  Skin: No rashes or lumps noted.   EXT: Right foot bandage in place. No surrounding redness or warmth. No drainage or bleeding though noted.   Neuro: No focal neurological deficits, AAOX3  Psych: Blunted affect     A/P:    #Recurrent right foot infection   #Uncontrolled recently diagnosed DM II  #Asymptomatic sinus bradycardia     Patient not septic, WBC normal, afebrile, and CRP much improved since last admission. Depth of wound unclear but osteo not appreciated during debridement. Given persistence of wound will rule out " osteo with bone scan. If foot scan concerning for osteomyelitis, will involve infectious disease. Will start cefipime, vanc, and flagyl. Intraoperative wound culture obtained. Suspect ulcers are diabetic foot wounds in nature, however, will get ABIs to rule out PAD.                          [facility] Medicine Services  History & Physical    Patient Identification:  Name:  Davian Newell  Age:  60 y.o.  Sex:  male  :  1960  MRN:  [id number]   Visit Number:  [id number]  Primary Care Physician:  Provider, No Known    Subjective     Chief complaint: Cellulitis and possible osteomyelitis    History of presenting illness:      Davian Newell is a 60 y.o. male with past medical history significant for diabetes mellitus, heartburn, arthritis.  He initially presented to this facility today for outpatient surgery with Dr. Daisy Gerard.  He was recently hospitalized at this facility from  through 2020 with right foot cellulitis and hyperglycemia with newly diagnosed diabetes mellitus type 2.  He was discharged with Augmentin, doxycycline, lactobacillus, and started on metformin in addition to being provided with a glucometer device kit.  Initial CT of the right foot during said admission was negative for known evidence of osteomyelitis.  He did complete outpatient Augmentin and doxycycline.  He followed up with  on 2020 and had purulent drainage with suspected abscess in the right foot observed.  He was then scheduled for incision and drainage of the right foot on 2020.  He presented to this facility on this date for this outpatient procedure.  It was felt that he would benefit from admission following this procedure for IV antibiotics and a bone scan to rule out underlying osteomyelitis.      Upon arrival to the outpatient surgery worsen of this facility, his vital signs were temperature 97.8 °F, pulse 61, respiration rate 20, and blood pressure 137/71.   Labs revealed white blood cell count of 5.11 with C-reactive protein of 1.07. Blood cultures were performed and pending and wound culture was performed as well.      Mr. Newell was evaluated by Jayleen Knutson PA-C in room 343B post-operatively. He reports he completed oral antibiotics and started Metformin.  He reports some mild diarrhea with both.  He denies chest pain, dyspnea, cough, and wheeze.  He reports worsening right foot redness and drainage prior to presentation. I am unable to see this during my exam as his right foot has just been dressed post-op.      ---------------------------------------------------------------------------------------------------------------------   Review of Systems   Constitutional: Negative for chills, fatigue and fever.   HENT: Negative for congestion and drooling.    Eyes: Negative for pain and discharge.   Respiratory: Negative for cough, chest tightness and shortness of breath.    Cardiovascular: Negative for chest pain and leg swelling.   Gastrointestinal: Negative for abdominal distention, diarrhea and vomiting.   Endocrine: Negative for cold intolerance and heat intolerance.   Genitourinary: Negative for difficulty urinating and dysuria.   Musculoskeletal: Negative for arthralgias and back pain.   Skin: Positive for color change and wound.   Allergic/Immunologic: Negative for environmental allergies and food allergies.   Neurological: Negative for dizziness and headaches.   Psychiatric/Behavioral: Negative for agitation and confusion.        ---------------------------------------------------------------------------------------------------------------------   Past Medical History:   Diagnosis Date   • Arthritis    • Diabetes mellitus (CMS/HCC)    • Heartburn      Past Surgical History:   Procedure Laterality Date   • ANKLE ARTHROSCOPY     • COLONOSCOPY     • FRACTURE SURGERY       History reviewed. No pertinent family history.  Social History     Socioeconomic History   •  Marital status: Single     Spouse name: Not on file   • Number of children: Not on file   • Years of education: Not on file   • Highest education level: Not on file   Tobacco Use   • Smoking status: Never Smoker   • Smokeless tobacco: Never Used   Substance and Sexual Activity   • Alcohol use: Yes   • Drug use: No   • Sexual activity: Defer     ---------------------------------------------------------------------------------------------------------------------   Allergies:  Patient has no known allergies.  ---------------------------------------------------------------------------------------------------------------------   Home medications:    Medications below are reported home medications pulling from within the system; at this time, these medications have not been reconciled unless otherwise specified and are in the verification process for further verifcation as current home medications.  Medications Prior to Admission   Medication Sig Dispense Refill Last Dose   • metFORMIN (GLUCOPHAGE) 500 MG tablet Take 1 tablet by mouth 2 (Two) Times a Day With Meals. 60 tablet 0 6/16/2020 at 0800       Hospital Scheduled Meds:    cefepime 2 g Intravenous Once   cefepime 2 g Intravenous Q8H   heparin (porcine) 5,000 Units Subcutaneous Q8H   insulin aspart 0-9 Units Subcutaneous TID AC   metroNIDAZOLE 500 mg Intravenous Q8H   sodium chloride 10 mL Intravenous Q12H       lactated ringers 125 mL/hr Last Rate: Stopped (06/16/20 1116)   Pharmacy to dose vancomycin         Current listed hospital scheduled medications may not yet reflect those currently placed in orders that are signed and held awaiting patient's arrival to floor.   ---------------------------------------------------------------------------------------------------------------------     Objective     Vital Signs:  Temp:  [97.3 °F (36.3 °C)-97.8 °F (36.6 °C)] 97.7 °F (36.5 °C)  Heart Rate:  [47-61] 50  Resp:  [8-20] 18  BP: (100-137)/(65-73) 125/71       06/16/20  0900   Weight: 76.7 kg (169 lb 2 oz)     Body mass index is 24.27 kg/m².  ---------------------------------------------------------------------------------------------------------------------       Physical Exam   Constitutional: He is oriented to person, place, and time. He appears well-developed and well-nourished.   HENT:   Head: Normocephalic and atraumatic.   Eyes: Pupils are equal, round, and reactive to light. EOM are normal.   Neck: Normal range of motion. Neck supple.   Cardiovascular: Normal rate and regular rhythm. Exam reveals no friction rub.   No murmur heard.  Pulmonary/Chest: Effort normal and breath sounds normal. No respiratory distress.   Abdominal: Soft. Bowel sounds are normal.   Musculoskeletal:   RLE wrapped post-operatively   Neurological: He is alert and oriented to person, place, and time.   Skin: Skin is warm and dry.   Psychiatric: He has a normal mood and affect. His behavior is normal.   Nursing note and vitals reviewed.            ---------------------------------------------------------------------------------------------------------------------  EKG:      EKG added & cardiac monitoring added given low pulse rates in the 40s          ---------------------------------------------------------------------------------------------------------------------   Results from last 7 days   Lab Units 06/16/20  0934   CRP mg/dL 1.07*   WBC 10*3/mm3 5.11   HEMOGLOBIN g/dL 13.4   HEMATOCRIT % 41.6   MCV fL 92.0   MCHC g/dL 32.2   PLATELETS 10*3/mm3 496*         Results from last 7 days   Lab Units 06/16/20  0934   SODIUM mmol/L 137   POTASSIUM mmol/L 4.6   CHLORIDE mmol/L 103   CO2 mmol/L 25.9   BUN mg/dL 19   CREATININE mg/dL 0.86   EGFR IF NONAFRICN AM mL/min/1.73 91   CALCIUM mg/dL 9.5   GLUCOSE mg/dL 131*   Estimated Creatinine Clearance: 99.1 mL/min (by C-G formula based on SCr of 0.86 mg/dL).  No results found for: AMMONIA          Lab Results   Component Value Date    HGBA1C 9.80 (H)  06/05/2020     Lab Results   Component Value Date    TSH 2.960 06/16/2020    FREET4 1.15 06/16/2020     No results found for: PREGTESTUR, PREGSERUM, HCG, HCGQUANT  Pain Management Panel     There is no flowsheet data to display.        No results found for: BLOODCX  No results found for: URINECX  No results found for: WOUNDCX  No results found for: STOOLCX      ---------------------------------------------------------------------------------------------------------------------  Imaging Results (Last 7 Days)     ** No results found for the last 168 hours. **          Cultures:  No results found for: BLOODCX, URINECX, WOUNDCX, MRSACX, RESPCX, STOOLCX    Last echocardiogram:               I have personally reviewed the above radiology images and read the final radiology report on 06/16/20  ---------------------------------------------------------------------------------------------------------------------  Assessment / Plan     Active Hospital Problems    Diagnosis POA   • **Abscess of right foot [L02.611] Unknown     Added automatically from request for surgery [id number]         ASSESSMENT/PLAN:  · Right foot cellulitis with abscess and possible osteomyelitis: Bone scan has been ordered. Blood cultures performed and wound culture performed. Will monitor daily CBC & C-RP. IV cefepime, flagyl, and vancomycin ordered for now. Wound culture from earlier this month reviewed and available within this document with growth of Streptococcus agalactiea.  NM bone scan has been ordered.     · Diabetes Mellitus type 2, NID: FSBG ACHS with SSI PRN ordered.  Hemoglobin a1c was 9.8 on 6/5/2020.     · Low heart rates with concern for sinus bradycardia: EKG added x1.  Cardiac monitoring added.   Will check TSH/free t4. Denies chest pain or dizziness.        ----------  -DVT prophylaxis: SQ Heparin  -Activity: Per podiatry  -Expected length of stay:   OBSERVATION status; however, if further evaluation or treatment plans warrant, status  may change.  Based upon current information, I predict patient's care encounter to be less than or equal to 2 midnights          High risk secondary to right foot cellulitis with concern for limb threatening osteomyelitis in the setting of newly diagnosed diabetes mellitus type 2.    Jayleen Knutson PA-C  06/16/20  13:08  Pager # [phone]  ---------------------------------------------------------------------------------------------------------------------             Electronically signed by Andriy Sutton MD at 06/16/20 3879     Daisy Gerard DPM at 06/16/20 1496          PODIATRIC SURGERY  History and Physical      Principal problem: Abscess of right foot    Subjective .     History of present illness:    Mr. Davian Newell is a 60 y.o. years old male with right foot infection. He was recently diagnosed with diabetes, HbA1C 9.8%, and started on metformin about 2 weeks ago. About three weeks ago he developed a callus on the lateral aspect of his right foot, he ripped it off and experienced progressing redness, swelling, warmth, and pain. He was admitted for IV antibiotics from 6/5 to 6/7 for treatment of cellulitis. A CT right foot during that admission was negative for osteomyelitis. He was discharged with one week PO Augmentin and Doxycycline, which he did complete. Upon outpatient follow up, purulent drainage with suspected abscess in the right foot was observed, he was then scheduled for incision and drainage of the right foot. An admission following surgery is planned for IV antibiotics, bone scan to rule out osteomyelitis, and possible infectious disease consult.     History taken from: patient    Case was discussed with patient    Review of Systems    Constitutional: no fever, chills and night sweats. No appetite change or unexpected weight change. No fatigue.  Eyes: no eye drainage, itching or redness.  HEENT: no mouth sores, dysphagia or nose bleed.  Respiratory: no for shortness of breath,  "cough or production of sputum.  Cardiovascular: no chest pain, no palpitations, no orthopnea.  Gastrointestinal: no nausea, vomiting or diarrhea. No abdominal pain, hematemesis or rectal bleeding.  Genitourinary: no dysuria or polyuria.  Hematologic/lymphatic: no lymph node abnormalities, no easy bruising or easy bleeding.  Musculoskeletal: no muscle or joint pain.  Skin: No rash and no itching.  Neurological: no loss of consciousness, no seizure, no headache.  Behavioral/Psych: no depression or suicidal ideation.  Endocrine: no hot flashes.  Immunologic: negative.    Past Medical History    Past Medical History:   Diagnosis Date   • Arthritis    • Diabetes mellitus (CMS/HCC)    • Heartburn        Past Surgical History    Past Surgical History:   Procedure Laterality Date   • ANKLE ARTHROSCOPY     • COLONOSCOPY     • FRACTURE SURGERY         Family History    History reviewed. No pertinent family history.    Social History    Social History     Tobacco Use   • Smoking status: Never Smoker   • Smokeless tobacco: Never Used   Substance Use Topics   • Alcohol use: Yes   • Drug use: No       Allergies    Patient has no known allergies.    Objective     /71 (BP Location: Left arm, Patient Position: Lying)   Pulse 61   Temp 97.8 °F (36.6 °C) (Oral)   Resp 20   Ht 177.8 cm (70\")   Wt 76.7 kg (169 lb 2 oz)   SpO2 99%   BMI 24.27 kg/m²      Temp:  [97.8 °F (36.6 °C)] 97.8 °F (36.6 °C)      No intake or output data in the 24 hours ending 06/16/20 0923      Physical Exam:     General Appearance:    Alert, cooperative, in no acute distress   Head:    Normocephalic, without obvious abnormality, atraumatic    Heart:    Regular rhythm and normal rate     Chest Wall:    No abnormalities observed   Abdomen:    Soft non-tender, non-distended, no guarding, no rebound                tenderness   Extremities:   Moves all extremities well, no cyanosis. Right plantar lateral foot multiple ulcerations with purulent drainage. " Cellulitis extending to ankle.   Pulses:   Pulses palpable and equal bilaterally   Skin:   No bleeding, bruising or rash   Lymph nodes:   No palpable adenopathy                   Lab Results   Component Value Date    NEUTROABS 6.04 06/07/2020                   Imaging Results (Last 24 Hours)     ** No results found for the last 24 hours. **            Results Review:    I have personally reviewed laboratory data, culture results, radiology studies and antimicrobial therapy.    Hospital Medications (active)       Dose Frequency Start End    lactated ringers infusion 125 mL/hr Continuous 6/16/2020     Route: Intravenous    sodium chloride 0.9 % flush 10 mL 10 mL Every 12 Hours Scheduled 6/16/2020     Route: Intravenous    sodium chloride 0.9 % flush 10 mL 10 mL As Needed 6/16/2020     Route: Intravenous    vancomycin 1 g/250 mL 0.9% NS (vial-mate) 1,000 mg Once 6/16/2020     Route: Intravenous            PROBLEM LIST:    Patient Active Problem List   Diagnosis   • Cellulitis of foot, right   • Hyperglycemia   • Abscess of right foot       Assessment/Plan     ASSESSMENT:    60 year old male with right foot ulcerations and abscess    PLAN:    -Proceeding with incision and drainage after thorough discussion of all alternatives, risks, and benefits.      Patients findings and recommendations were discussed with patient    Code Status:   There are no questions and answers to display.       Daisy Gerard DPM  06/16/20  09:23            Electronically signed by Daisy Gerard DPM at 06/16/20 0938          Operative/Procedure Notes (most recent note)      Daisy Gerard DPM at 06/16/20 1107        Davian Newell  6/16/2020      Operative Progress Note:    Surgeon and Assistant: Dr. Daisy Gerard DPM    Pre-Operative Diagnosis:   1.  Right foot abscess and cellulitis  2.  Right foot diabetic ulceration    Post-Operative Diagnosis:   1.  Right foot abscess and cellulitis  2.  Right foot diabetic  ulceration    Procedure(s):  Right foot incision and drainage below fascia    Type of Anesthesia Administered: General with 10mL ankle block 0.5% marcaine    Estimated Blood Loss: 2mL    Hemostasis: ankle tourniquet inflated to 250mmHg    Blood Products: None    Specimen Obtained/Removed: Swab culture sent to pathology    Complication(s):  None    Graft/Implant/Prosthetics/Implanted Device/Transplants: None    Indication: 60 year old male with non-healing diabetic ulceration lateral right foot. He has had recurrent signs of retained fluid and abscess after both IV and oral antibiotics and bedside irrigation. He presents today for surgical incision and drainage of the right foot after thorough discussion of all alternatives, risks and benefits.    Findings: Right foot multiple plantar lateral 5th metatarsal ulcerations, largest at the metatarsal head 1.5cm x 1cm. Purulent drainage tracking to the plantar midfoot. No signs of bone necrosis.    Operative Report:  Patient was identified in the preoperative holding area formal consent was signed and the right lower extremity was marked as correct site.  Patient was brought to the operating suite and placed in the operating table in the supine position.  Timeout was performed he then he underwent general anesthesia.  A well-padded ankle tourniquet was applied to the right.  The right lower extremity was then scrubbed prepped and draped in the usual sterile manner.  Attention was then focused to the plantar lateral fifth metatarsal but there is noted to be multiple ulcerations as well as cellulitis extending both dorsally and plantarly to the ankle.  The foot was elevated and the tourniquet was inflated.  Approximately 4 cm incision was made at the plantar lateral aspect of the fifth metatarsal using a 15 blade.  Hemostats were used to explore the wound there is noted to be no tracking on the dorsal aspect of the foot but there was 5 to 6 cm tracking to the plantar midfoot  with approximately 2 mL of purulent fluid expressed from this area.  There were no signs of bone necrosis.  Swab culture was taken of the plantar foot.  The surgical site was then copiously irrigated with a mixture of sterile saline, bacitracin, polymyxin.  Vancomycin powder was then applied to the surgical site.  2-0 nylon retention sutures were placed.  And the incision was packed with Betadine soaked quarter inch packing.  Ankle block was performed at this time.  Dry sterile dressing was applied, the tourniquet was deflated, there was noted to be brisk capillary refill to all digits.  The patient was extubated and transferred to PACU with all vital signs stable. Patient will be admitted under the care of the hospitalist for continuation of IV antibiotics as well as bone scan to evaluate for osteomyelitis.      Electronically Signed by: Daisy Gerard DPM        Dictated Utilizing Dragon Dictation      Electronically signed by Daisy Gerard DPM at 06/16/20 7216

## 2020-06-19 NOTE — PAYOR COMM NOTE
"Contact: Gladys Chong RN @ [facility]  Phone: [phone]  Fax: [phone]    Auth# [id number]  Clinical update         Davian Zazueta (60 y.o. Male)     Date of Birth Social Security Number Address Home Phone MRN    1960  [address] [id number] [phone] [id number]    Mandaen Marital Status          [facility] Single       Admission Date Admission Type Admitting Provider Attending Provider Department, Room/Bed    20 Elective Daisy Gerard, Andriy Robles MD [address], 3343/2S    Discharge Date Discharge Disposition Discharge Destination                       Attending Provider:  Andriy Sutton MD    Allergies:  No Known Allergies    Isolation:  None   Infection:  None   Code Status:  CPR    Ht:  177.8 cm (70\")   Wt:  76.7 kg (169 lb 2 oz)    Admission Cmt:  None   Principal Problem:  Abscess of right foot [L02.611] More...                 Active Insurance as of 2020     Primary Coverage     Payor Plan Insurance Group Employer/Plan Group    CIGNA MISC CIGNA [id number]     Coverage Address Coverage Phone Number Coverage Fax Number Effective Dates    [address] [phone]  2018 - None Entered    [facility] [id number]       Subscriber Name Subscriber Birth Date Member ID       DAVIAN ZAZUETA 1960 [id number]                 Emergency Contacts      (Rel.) Home Phone Work Phone Mobile Phone    GRIS HALL (Sister) [phone] -- --               Physician Progress Notes (last 24 hours) (Notes from 20 1155 through 20 1155)      Maricruz Alston PA-C at 20 0937                     PROGRESS NOTE         Patient Identification:  Name:  Davian Zazueta  Age:  60 y.o.  Sex:  male  :  1960  MRN:  [id number]  Visit Number:  [id number]  Primary Care Provider:  Provider, No Known         LOS: 3 days       "       ----------------------------------------------------------------------------------------------------------------------  Subjective       Chief Complaints:    No chief complaint on file.        Interval History:      Comfortable this morning sitting at the edge of his bed.  CRP improved almost normal at 0.96 with normal white count. Wound reported to be healing well.    Review of Systems:    Constitutional: no fever, chills and night sweats. No appetite change or unexpected weight change. No fatigue.  Eyes: no eye drainage, itching or redness.  HEENT: no mouth sores, dysphagia or nose bleed.  Respiratory: no for shortness of breath, cough or production of sputum.  Cardiovascular: no chest pain, no palpitations, no orthopnea.  Gastrointestinal: no nausea, vomiting or diarrhea. No abdominal pain, hematemesis or rectal bleeding.  Genitourinary: no dysuria or polyuria.  Hematologic/lymphatic: no lymph node abnormalities, no easy bruising or easy bleeding.  Musculoskeletal: no muscle or joint pain.  Skin: No rash and no itching.  Neurological: no loss of consciousness, no seizure, no headache.  Behavioral/Psych: no depression or suicidal ideation.  Endocrine: no hot flashes.  Immunologic: negative.  ----------------------------------------------------------------------------------------------------------------------      Objective       Current [facility] Meds:    cefTRIAXone 2 g Intravenous Q24H   heparin (porcine) 5,000 Units Subcutaneous Q8H   insulin aspart 0-9 Units Subcutaneous TID AC   lactobacillus acidophilus 1 capsule Oral Daily   pantoprazole 40 mg Oral Q AM   sodium chloride 10 mL Intravenous Q12H   vancomycin 1,500 mg Intravenous Q12H        ----------------------------------------------------------------------------------------------------------------------    Vital Signs:  Temp:  [97.5 °F (36.4 °C)-98.5 °F (36.9 °C)] 97.7 °F (36.5 °C)  Heart Rate:  [50-56] 50  Resp:  [18] 18  BP: (118-141)/(56-71)  141/68  No data found.  SpO2 Percentage    06/18/20 1510 06/18/20 1944 06/19/20 0634   SpO2: 98% 97% 98%     SpO2:  [97 %-98 %] 98 %  on   ;   Device (Oxygen Therapy): room air    Body mass index is 24.27 kg/m².  Wt Readings from Last 3 Encounters:   06/16/20 76.7 kg (169 lb 2 oz)   06/05/20 81.6 kg (180 lb)   11/16/19 84.4 kg (186 lb)        Intake/Output Summary (Last 24 hours) at 6/19/2020 0937  Last data filed at 6/19/2020 0400  Gross per 24 hour   Intake 2020.06 ml   Output --   Net 2020.06 ml     Diet Regular; Consistent Carbohydrate  ----------------------------------------------------------------------------------------------------------------------      Physical Exam:    Constitutional:  Well-developed and well-nourished.  No respiratory distress.      HENT:  Head: Normocephalic and atraumatic.  Mouth:  Moist mucous membranes.    Eyes:  Conjunctivae and EOM are normal.  No scleral icterus.  Neck:  Neck supple.  No JVD present.    Cardiovascular:  Normal rate, regular rhythm and normal heart sounds with no murmur. No edema.  Pulmonary/Chest:  No respiratory distress, no wheezes, no crackles, with normal breath sounds and good air movement.  Abdominal:  Soft.  Bowel sounds are normal.  No distension and no tenderness.   Musculoskeletal: Right foot in surgical dressing  Neurological:  Alert and oriented to person, place, and time.  No facial droop.  No slurred speech.   Skin:  Skin is warm and dry.  No rash noted.  No pallor.   Psychiatric:  Normal mood and affect.  Behavior is normal.       ----------------------------------------------------------------------------------------------------------------------            Results from last 7 days   Lab Units 06/19/20  0538 06/18/20  0044 06/17/20  0427 06/16/20  1334 06/16/20  0934   CRP mg/dL 0.96*  --   --   --  1.07*   LACTATE mmol/L  --   --   --  1.7  --    WBC 10*3/mm3 4.65 5.63 6.66  --  5.11   HEMOGLOBIN g/dL 11.8* 11.7* 11.6*  --  13.4   HEMATOCRIT %  36.9* 36.7* 37.0*  --  41.6   MCV fL 91.8 92.4 93.4  --  92.0   MCHC g/dL 32.0 31.9 31.4*  --  32.2   PLATELETS 10*3/mm3 407 419 416  --  496*     Results from last 7 days   Lab Units 06/19/20  0538 06/18/20  0044 06/17/20  0427 06/16/20  1334   SODIUM mmol/L 138 137 136 137   POTASSIUM mmol/L 4.0 3.9 4.2 4.7   MAGNESIUM mg/dL 2.1 2.2 2.1  --    CHLORIDE mmol/L 106 106 106 104   CO2 mmol/L 23.3 22.2 21.9* 24.2   BUN mg/dL 12 13 15 19   CREATININE mg/dL 0.70* 0.72* 0.81 0.93   EGFR IF NONAFRICN AM mL/min/1.73 115 111 97 83   CALCIUM mg/dL 8.7 8.6 8.6 8.9   GLUCOSE mg/dL 141* 115* 125* 178*   ALBUMIN g/dL  --   --   --  3.07*   BILIRUBIN mg/dL  --   --   --  0.2   ALK PHOS U/L  --   --   --  77   AST (SGOT) U/L  --   --   --  16   ALT (SGPT) U/L  --   --   --  12   Estimated Creatinine Clearance: 121.7 mL/min (A) (by C-G formula based on SCr of 0.7 mg/dL (L)).  No results found for: AMMONIA    Glucose   Date/Time Value Ref Range Status   06/19/2020 0635 128 70 - 130 mg/dL Final   06/18/2020 2320 121 70 - 130 mg/dL Final   06/18/2020 1643 172 (H) 70 - 130 mg/dL Final   06/18/2020 1041 148 (H) 70 - 130 mg/dL Final   06/18/2020 0658 144 (H) 70 - 130 mg/dL Final   06/17/2020 2153 142 (H) 70 - 130 mg/dL Final   06/17/2020 1710 133 (H) 70 - 130 mg/dL Final   06/17/2020 1112 123 70 - 130 mg/dL Final     Lab Results   Component Value Date    HGBA1C 9.80 (H) 06/05/2020     Lab Results   Component Value Date    TSH 2.960 06/16/2020    FREET4 1.15 06/16/2020       Blood Culture   Date Value Ref Range Status   06/16/2020 No growth at 2 days  Preliminary   06/16/2020 No growth at 2 days  Preliminary     No results found for: URINECX  Wound Culture   Date Value Ref Range Status   06/16/2020 No growth at 3 days  Final   06/16/2020 Rare Normal Skin Winter  Final     No results found for: STOOLCX  No results found for: RESPCX  Pain Management Panel     There is no flowsheet data to display.             ----------------------------------------------------------------------------------------------------------------------  Imaging Results (Last 24 Hours)     ** No results found for the last 24 hours. **          ----------------------------------------------------------------------------------------------------------------------    Assessment/Plan       Assessment/Plan     ASSESSMENT:    1. Osteomyelitis of the right foot     PLAN:     Comfortable this morning sitting at the edge of his bed.  CRP improved almost normal at 0.96 with normal white count. Wound reported to be healing well.    Sp incision and drainage on 6/16/2020 and feel he would benefit from admission for IV antibiotic therapy and a bone scan to rule out underlying osteomyelitis.  Wound culture from 6/5/2020 showing growth of group B strep with intraoperative cultures from 6/16/2020 in progress.  CRP low at 1.07 with normal white count.  MRI of the right foot does not suggest osteomyelitis at this time as well as no soft tissue abscess.     Case discussed with Dr. Sutton and Dr. Gerard.  As there is concern for osteomyelitis with recurrent infection would recommend to treat as such for a prolonged course of 4 weeks.  Based on previous cultures would change antibiotic therapy to high-dose Rocephin 2 g IV every 24 hours and Daptomycin per pharmacy dosing for easier outpatient administration.       Code Status:   Code Status and Medical Interventions:   Ordered at: 06/16/20 0948     Code Status:    CPR     Medical Interventions (Level of Support Prior to Arrest):    Full       Maricruz Alston PA-C  06/19/20  09:37      Electronically signed by Maricruz Alston PA-C at 06/19/20 1035     OPaulina Munoz PA at 06/18/20 1225     Attestation signed by Andriy Sutton MD at 06/18/20 7536    I have reviewed this documentation and agree.                                              [facility] Medicine Services  PROGRESS NOTE      Patient Identification:  Name:  Davian Newell  Age:  60 y.o.  Sex:  male  :  1960  MRN:  [id number]  Visit Number:  [id number]  Primary Care Provider:  Provider, No Known    Length of stay:  2    ----------------------------------------------------------------------------------------------------------------------  Subjective     Chief Complaint:  Follow up for right foot abscess, diabetes     History of Presenting Illness/Hospital Course:  Patient is a 61 yo male with past medical history significant for recently diagnosed T2DM, recurrent right foot infection, and GERD that was admitted on 2020 secondary to right foot cellulitis/abscess with possible osteomyelitis. Please see admitting history and physical for further details. Patient was taken to the OR on 2020 per Dr. Daisy Gerard with podiatry and underwent incision and drainage of right foot abscess below the fascia, please see op note for details.  Wound culture from 2020 with a result of group B streptococcus.  Intraoperative wound culture pending.  Patient underwent MRI yesterday with no significant findings of osteomyelitis.  However, per podiatrist in setting of recurrent infection will treat as osteomyelitis.  Infectious diseases on board, appreciate the recommendations.    Subjective:  Today, the patient was resting in bed per my evaluation this morning.  Patient no acute distress.  Patient is of no complaints.  Patient was hoping to go home today but is agreeable to stay for IV antibiotic adjustments and infectious disease consultation.  Discussed findings of MRI, patient verbalized understanding.  Discussed possibility of long-term IV antibiotics, patient agreeable.  Patient denies any fevers or chills.  Denies any numbness, tingling, or paresthesias.  Pain is controlled.  Denies any abdominal pain, nausea, vomiting or change in bowel movements.  Reports regular bowel movements.  Denies any urinary symptoms.  Has been ambulating  with use of walker in room as well as crutches.  Denies any issues.    Present during exam: N/A   ----------------------------------------------------------------------------------------------------------------------  Objective     Consults:  • Podiatry   • Infectious disease    Procedures:  • 6/16/2020: Incision and drainage below fascia, right foot -- Dr. Daisy Gerard, podiatry   o Intraoperative wound cultures pending    Current Hospital Meds:    cefTRIAXone 2 g Intravenous Q24H   heparin (porcine) 5,000 Units Subcutaneous Q8H   insulin aspart 0-9 Units Subcutaneous TID AC   lactobacillus acidophilus 1 capsule Oral Daily   pantoprazole 40 mg Oral Q AM   sodium chloride 10 mL Intravenous Q12H   vancomycin 1,250 mg Intravenous Q12H        ----------------------------------------------------------------------------------------------------------------------  Vital Signs:  Temp:  [97.5 °F (36.4 °C)-98.9 °F (37.2 °C)] 97.5 °F (36.4 °C)  Heart Rate:  [52-81] 55  Resp:  [18] 18  BP: (111-145)/(60-73) 129/64    SpO2 Percentage    06/18/20 0640 06/18/20 0900 06/18/20 1056   SpO2: 98% 98% 98%     SpO2:  [97 %-98 %] 98 %  on  Flow (L/min):  [2] 2;   Device (Oxygen Therapy): room air    Body mass index is 24.27 kg/m².  Wt Readings from Last 3 Encounters:   06/16/20 76.7 kg (169 lb 2 oz)   06/05/20 81.6 kg (180 lb)   11/16/19 84.4 kg (186 lb)        Intake/Output Summary (Last 24 hours) at 6/18/2020 1225  Last data filed at 6/18/2020 0650  Gross per 24 hour   Intake 2609 ml   Output 400 ml   Net 2209 ml     Diet Regular; Consistent Carbohydrate  ----------------------------------------------------------------------------------------------------------------------  Physical exam:  Physical Exam   Constitutional: He is oriented to person, place, and time. He appears well-developed and well-nourished.  Non-toxic appearance. No distress.   Pleasant, sitting up on edge of bed, no acute distress noted.   HENT:   Head: Normocephalic and  atraumatic.   Mouth/Throat: Mucous membranes are normal.   Eyes: Pupils are equal, round, and reactive to light. Conjunctivae are normal. No scleral icterus.   Neck: Normal range of motion. Neck supple. No muscular tenderness present.   Cardiovascular: Normal rate, regular rhythm, normal heart sounds and intact distal pulses. Exam reveals no gallop and no friction rub.   No murmur heard.  Pulses:       Dorsalis pedis pulses are 2+ on the left side. Right dorsalis pedis pulse not accessible.        Posterior tibial pulses are 2+ on the left side. Right posterior tibial pulse not accessible.   Unable to assess DP/PT of RLE d/t dressing. Cap refill is intact and less than 3 seconds.    Pulmonary/Chest: Effort normal and breath sounds normal. No accessory muscle usage. No tachypnea. No respiratory distress. He has no wheezes. He has no rhonchi. He has no rales. He exhibits no tenderness.   Abdominal: Soft. Bowel sounds are normal. He exhibits no distension. There is no tenderness. There is no rebound and no guarding.   Genitourinary:   Genitourinary Comments: No goodwin catheter in place.    Musculoskeletal: He exhibits no edema, tenderness or deformity.        Right lower leg: He exhibits no edema.        Left lower leg: He exhibits no edema.   RLE with ace wrap/bandage in place. No edema noted, no saturation of dressing, no erythema present. Cap refill intact. Skin on digits warm and dry.    Neurological: He is alert and oriented to person, place, and time. He has normal strength. No cranial nerve deficit or sensory deficit.   Awake and alert. Follows commands. Answers questions appropriately. Moves all extremities equally, strength and sensation intact. No focal neurological deficits on exam.    Skin: Skin is warm and dry. Capillary refill takes less than 2 seconds. No rash noted. No erythema. No pallor.   Psychiatric: He has a normal mood and affect. His speech is normal and behavior is normal. Judgment and thought  content normal. Cognition and memory are normal.   Nursing note and vitals reviewed.     ----------------------------------------------------------------------------------------------------------------------  Tele:    Sinus bradycardia 50s    I have personally reviewed the EKG/Telemetry strips   ----------------------------------------------------------------------------------------------------------------------            Results from last 7 days   Lab Units 06/18/20 0044 06/17/20 0427 06/16/20 1334 06/16/20  0934   CRP mg/dL  --   --   --  1.07*   LACTATE mmol/L  --   --  1.7  --    WBC 10*3/mm3 5.63 6.66  --  5.11   HEMOGLOBIN g/dL 11.7* 11.6*  --  13.4   HEMATOCRIT % 36.7* 37.0*  --  41.6   MCV fL 92.4 93.4  --  92.0   MCHC g/dL 31.9 31.4*  --  32.2   PLATELETS 10*3/mm3 419 416  --  496*     Results from last 7 days   Lab Units 06/18/20 0044 06/17/20 0427 06/16/20  1334   SODIUM mmol/L 137 136 137   POTASSIUM mmol/L 3.9 4.2 4.7   MAGNESIUM mg/dL 2.2 2.1  --    CHLORIDE mmol/L 106 106 104   CO2 mmol/L 22.2 21.9* 24.2   BUN mg/dL 13 15 19   CREATININE mg/dL 0.72* 0.81 0.93   EGFR IF NONAFRICN AM mL/min/1.73 111 97 83   CALCIUM mg/dL 8.6 8.6 8.9   GLUCOSE mg/dL 115* 125* 178*   ALBUMIN g/dL  --   --  3.07*   BILIRUBIN mg/dL  --   --  0.2   ALK PHOS U/L  --   --  77   AST (SGOT) U/L  --   --  16   ALT (SGPT) U/L  --   --  12   Estimated Creatinine Clearance: 118.4 mL/min (A) (by C-G formula based on SCr of 0.72 mg/dL (L)).    Glucose   Date/Time Value Ref Range Status   06/18/2020 1041 148 (H) 70 - 130 mg/dL Final   06/18/2020 0658 144 (H) 70 - 130 mg/dL Final   06/17/2020 2153 142 (H) 70 - 130 mg/dL Final   06/17/2020 1710 133 (H) 70 - 130 mg/dL Final   06/17/2020 1112 123 70 - 130 mg/dL Final   06/17/2020 0807 171 (H) 70 - 130 mg/dL Final   06/16/2020 2253 106 70 - 130 mg/dL Final   06/16/2020 1708 160 (H) 70 - 130 mg/dL Final     Lab Results   Component Value Date    HGBA1C 9.80 (H) 06/05/2020     Lab  Results   Component Value Date    TSH 2.960 06/16/2020    FREET4 1.15 06/16/2020     Microbiology Results (last 10 days)     Procedure Component Value - Date/Time    Wound Culture - Swab, Foot, Right [[id number]] Collected:  06/16/20 1122    Lab Status:  Preliminary result Specimen:  Swab from Foot, Right Updated:  06/18/20 0648     Wound Culture No growth at 2 days     Gram Stain Rare (1+) WBCs seen      No organisms seen    Wound Culture - Wound, Foot, Right [[id number]] Collected:  06/16/20 1000    Lab Status:  Preliminary result Specimen:  Wound from Foot, Right Updated:  06/18/20 0651     Wound Culture No growth at 2 days     Gram Stain Few (2+) WBCs seen      No organisms seen    Blood Culture - Blood, Arm, Right [[id number]] Collected:  06/16/20 0934    Lab Status:  Preliminary result Specimen:  Blood from Arm, Right Updated:  06/18/20 0945     Blood Culture No growth at 2 days    Blood Culture - Blood, Arm, Left [[id number]] Collected:  06/16/20 0934    Lab Status:  Preliminary result Specimen:  Blood from Arm, Left Updated:  06/18/20 0945     Blood Culture No growth at 2 days    COVID PRE-OP / PRE-PROCEDURE SCREENING ORDER (NO ISOLATION) - Swab, Nasopharynx [[id number]] Collected:  06/12/20 1205    Lab Status:  Final result Specimen:  Swab from Nasopharynx Updated:  06/15/20 0748    Narrative:       The following orders were created for panel order COVID PRE-OP / PRE-PROCEDURE SCREENING ORDER (NO ISOLATION) - Swab, Nasopharynx.  Procedure                               Abnormality         Status                     ---------                               -----------         ------                     COVID-19,[facility], NP ...[[id number]]                      Final result                 Please view results for these tests on the individual orders.    COVID-19,[facility], NP SWAB IN [facility]EX[facility] SALINE MEDIA 24-30 HR TAT - Swab, Nasopharynx [[id number]] Collected:  06/12/20 1208    Lab Status:  Final result Specimen:   Swab from Nasopharynx Updated:  06/15/20 0748     Reference Lab Report [facility]     COVID19 Not Detected           I have personally reviewed the above laboratory results.   ----------------------------------------------------------------------------------------------------------------------  Imaging Results (Last 24 Hours)     Procedure Component Value Units Date/Time    US Ankle / Brachial Indices Extremity Complete [[id number]] Collected:  06/17/20 0946     Updated:  06/17/20 0949    Narrative:       FINDINGS:  Right: Right index brachial pressure of 128 mmHg.  Posterior tibialis arterial pressure of 150 mmHg for an  index of 1.07.  Dorsalis pedis artery  pressure of 164 mmHg for an  index of 1.17.  Left: Left index brachial pressure of 140 mmHg.  Posterior tibialis artery pressure of 168 mmHg for an  index of 1.20.  Dorsalis pedis artery pressure of 167 mmHg for an  index of 1.19.    Impression:       Composite right ankle brachial index of 1.17and left of  1.20.     This report was finalized on 6/17/2020 9:47 AM by Dr. Mayito Baca MD.            I have personally reviewed the above radiology results.   ----------------------------------------------------------------------------------------------------------------------  Assessment/Plan     Active Hospital Problems    Diagnosis POA   • **Abscess of right foot [L02.611] Yes     Added automatically from request for surgery [id number]     • T2DM (type 2 diabetes mellitus) (CMS/[facility]) [E11.9] Yes   • GERD (gastroesophageal reflux disease) [K21.9] Yes   • Hypoalbuminemia [E88.09] Yes     · Right foot abscess with cellulitis, rule out osteomyelitis: Blood cultures with NGTD. Wound cx from 6/5/2020 with growth of group B strep. Intraoperative wound culture pending. Appreciate podiatry input/assistance.  MRI without significant changes of osteomyelitis, however per podiatry will proceed with treating as osteomyelitis secondary to recurrent infection.  Infectious disease  consult placed, IV antibiotics have been adjusted to high-dose IV Rocephin and pharmacy to dose vancomycin.  Discussed with patient, he is agreeable.  PICC line consult placed.  Continue lactobacillus for gut protection.  Continue wound care.  ABIs reviewed without significant peripheral arterial disease.  · Asymptomatic bradycardia: Monitor on tele.   · Recently diagnosed type II diabetes mellitus: Hemoglobin A1C 9.8. Blood glucose levels adequately controlled. Continue current SSI dose, titrate as necessary. Monitor blood glucose levels closely with accuchecks.   · Mild normocytic anemia: No active bleeding, no hematoma. Closely monitor H&H, transfuse if necessary. Daily CBC.   · GERD: PPI   · Hypoalbuminemia: Likely multifactorial. Monitor closely, repeat chem panel in AM.     --------------------------------------------------  DVT Prophylaxis: SQ heparin   GI Prophylaxis: PPI   FEN: No IV fluids. Replace electrolytes per protocol as necessary. Consistent carbohydrate diet.   Activity: Up with assistance as tolerated   Disposition: Pending MRI, continues on IV abx  --------------------------------------------------    The patient is high risk due to the following diagnoses/reasons:  Right foot abscess, ? osteomyelitis, recently dx T2DM    I have discussed the patient's assessment and plan with the patient, AM POLO Summers, and attending physician Dr. Sutton.       Paulina Yip PA-C  Hospitalist Service -- [facility]   Pager: [phone]    06/18/20  12:25    Attending Physician: Andriy Sutton MD    ----------------------------------------------------------------------------------------------------------------------        Electronically signed by Andriy Sutton MD at 06/18/20 7927     Daisy Gerard DPM at 06/18/20 1211          PODIATRIC SURGERY PROGRESS NOTE     LOS: 2 days     Subjective     Interval History:     2 days s/p right foot incision and drainage. No complaints today.    Objective        Vital Signs  Temp:  [97.5 °F (36.4 °C)-98.9 °F (37.2 °C)] 97.5 °F (36.4 °C)  Heart Rate:  [52-81] 55  Resp:  [18] 18  BP: (111-145)/(60-73) 129/64    Labs & Rads    Imaging Results (Last 72 Hours)     Procedure Component Value Units Date/Time    MRI Foot Right With Contrast [[id number]] Collected:  06/18/20 0755     Updated:  06/18/20 0800    Narrative:       EXAMINATION: MRI FOOT RIGHT W CONTRAST-      CLINICAL INDICATION:     Osteomyelitis suspected, foot swelling,  diabetic; L02.611-Cutaneous abscess of right foot     TECHNIQUE:  MRI FOOT RIGHT W CONTRAST-      COMPARISON: NONE      FINDINGS:   No soft tissue abscess identified. Soft tissue edema noted in region of  the fifth metatarsal head. Very subtle cortical edema of the fifth  metatarsal head versus some region of fat suppression loss without  definite marrow signal changes to suggest osteomyelitis at this time.  Muscles and tendons appear unremarkable. Other bones appear  unremarkable.    Impression:       No soft tissue abscess identified. Soft tissue edema noted  in region of the fifth metatarsal head. Very subtle cortical edema of  the fifth metatarsal head versus some region of fat suppression loss  without definite marrow signal changes to suggest osteomyelitis at this  time.     This report was finalized on 6/18/2020 7:58 AM by Dr. Mayito Baca MD.       US Ankle / Brachial Indices Extremity Complete [[id number]] Collected:  06/17/20 0946     Updated:  06/17/20 0949    Narrative:       EXAMINATION: US ANKLE / BRACHIAL INDICES EXTREMITY COMPLETE-      Technique: Ankle/brachial index pressures performed by noninvasive  vascular laboratory.     COMPARISON:    None available.     CLINICAL INDICATION:    Peripheral vascular disease.     FINDINGS:     Right: Right index brachial pressure of 128 mmHg.     Posterior tibialis arterial pressure of 150 mmHg for an  index of 1.07.     Dorsalis pedis artery  pressure of 164 mmHg for an  index of 1.17.      Left: Left index brachial pressure of 140 mmHg.     Posterior tibialis artery pressure of 168 mmHg for an  index of 1.20.     Dorsalis pedis artery pressure of 167 mmHg for an  index of 1.19.       Impression:       Composite right ankle brachial index of 1.17and left of  1.20.     This report was finalized on 6/17/2020 9:47 AM by Dr. Mayito Baca MD.           Results from last 7 days   Lab Units 06/18/20  0044 06/17/20  0427 06/16/20  0934   WBC 10*3/mm3 5.63 6.66 5.11   CRP mg/dL  --   --  1.07*     Lab Results   Component Value Date    NEUTROABS 2.71 06/18/2020     Results from last 7 days   Lab Units 06/18/20  0044   CREATININE mg/dL 0.72*       Blood Culture   Date Value Ref Range Status   06/16/2020 No growth at 24 hours  Preliminary   06/16/2020 No growth at 24 hours  Preliminary     No results found for: BCIDPCR, CXREFLEX, CSFCX, CULTURETIS  No results found for: CULTURES, HSVCX, URCX  No results found for: EYECULTURE, GCCX, LABHSV  No results found for: LEGIONELLA, MRSACX, MUMPSCX, MYCOPLASCX  No results found for: NOCARDIACX, STOOLCX  No results found for: THROATCX, UNSTIMCULT, URINECX, CULTURE, VZVCULTUR  Wound Culture   Date Value Ref Range Status   06/16/2020 No growth  Preliminary       Results Review:    I have personally reviewed laboratory data, culture results, radiology studies and antimicrobial therapy.      Physical Exam:  Sutures in lateral right foot. Minimal periwound erythema and edema, no warmth, no fluctuance. Minimal serosanguinous drainage. No malodor.     Results Review:     I reviewed the patient's new clinical results.      Assessment:  Principal Problem:    Abscess of right foot  Active Problems:    T2DM (type 2 diabetes mellitus) (CMS/HCC)    GERD (gastroesophageal reflux disease)    Hypoalbuminemia      Plan:  S/p right foot incision and drainge 6/16/20.     -Dressing changed. Nursing to change dressing daily - betadine soaked guaze, continue upon discharge.  -DMITRY 6/17: mildly  elevated, normal waveforms.    -MRI right foot 6/17: 5th metatarsal head subtle cortical edema without marrow changes  -Discussed case with Dr. Sutton and Dr. Douglas. There is concern for early osteomyelitis as well as recent history of recurrent abscess formation with cellulitis not resolving with PO antibiotics. Agree with a prolonged course of antibiotics based on previous admission culture results as current admission cultures negative likely due to recent antibiotics.  -Continue medical management per hospitalist  -Heel touch WB RLE.       Daisy Gerard DPM  06/18/20  12:11      Electronically signed by Daisy Gerard DPM at 06/18/20 1240       Consult Notes (last 24 hours) (Notes from 06/18/20 1155 through 06/19/20 1155)    No notes of this type exist for this encounter.     Scheduled Meds Sorted by Name   for Davian Newell as of 06/19/20 1155    Legend:                           Inactive     Active     Linked               Medications 06/19/20 06/20/20 06/21/20 06/22/20 06/23/20 06/24/20 06/25/20   cefTRIAXone (ROCEPHIN) 2 g/100 mL 0.9% NS VTB (PRAKASH)   Dose: 2 g  Freq: Every 24 Hours Route: IV  Indications of Use: BONE AND/OR JOINT INFECTION,SKIN AND SOFT TISSUE INFECTION  Start: 06/19/20 1400 End: 07/14/20 1359    Admin Instructions:   Caution: Look alike/sound alike drug alert. Break seal and mix to activiate vial before use.    1400      1400      1400      1400      1400      1400      1400        DAPTOmycin (CUBICIN) 450 mg in sodium chloride 0.9 % 50 mL IVPB   Dose: 6 mg/kg  Weight Dosing Info: 76.7 kg  Freq: Every 24 Hours Route: IV  Indications of Use: BONE AND/OR JOINT INFECTION  Start: 06/19/20 1300 End: 07/15/20 1259    Admin Instructions:   Caution: Look alike/sound alike drug alert. Refrigerate. Do not shake. Do not infuse with other fluids or drugs.    Order specific questions:   Reason for Therapy Other  Specify long-term abx for osteomylelitis    1300      1300      1300      1300       1300      1300      1300        heparin (porcine) 5000 UNIT/ML injection 5,000 Units   Dose: 5,000 Units  Freq: Every 8 Hours Scheduled Route: SC  Indications Comment: Prophylaxis of Venous Thromboembolism  Start: 06/16/20 1400    0508     1400     2200      0600     1400     2200      0600     1400     2200      0600     1400     2200      0600     1400     2200      0600     1400     2200      0600     1400     2200        insulin aspart (novoLOG) injection 0-9 Units   Dose: 0-9 Units  Freq: 3 Times Daily Before Meals Route: SC  Start: 06/16/20 1230    Admin Instructions:   Correction - Moderate Dose.  40-60 units/day total insulin dose or average weight, on oral agents    Blood glucose 150-199 mg/dL - 2 units  Blood glucose 200-249 mg/dL - 4 units  Blood glucose 250-299 mg/dL - 6 units  Blood glucose 300-349 mg/dL - 7 units  Blood glucose 350-400 mg/dL - 8 units  Blood glucose greater than 400 mg/dL - 9 units and call provider      ([id number])     ([id number])     1730      0730     1130     1730      0730     1130     1730      0730     1130     1730      0730     1130     1730      0730     1130     1730      0730     1130     1730        lactobacillus acidophilus (RISAQUAD) capsule 1 capsule   Dose: 1 capsule  Freq: Daily Route: PO  Start: 06/17/20 1800    0925      0900      0900      0900      0900      0900      0900        pantoprazole (PROTONIX) EC tablet 40 mg   Dose: 40 mg  Freq: Every Early Morning Route: PO  Start: 06/18/20 0600    Admin Instructions:   Swallow whole; do not crush, split, or chew.    0509      0600      0600      0600      0600      0600      0600        sodium chloride 0.9 % flush 10 mL   Dose: 10 mL  Freq: Every 12 Hours Scheduled Route: IV  Start: 06/16/20 0950    0925     2100      0900 2100      0900 2100 0900 2100 0900 2100 0900 2100 0900     2100        Medications 06/19/20 06/20/20 06/21/20 06/22/20 06/23/20 06/24/20 06/25/20       Continuous Meds  Sorted by Name   for Davian Newell as of 06/19/20 1155    Legend:                           Inactive     Active     Linked               Medications 06/19/20 06/20/20 06/21/20 06/22/20 06/23/20 06/24/20 06/25/20   Pharmacy Consult   Freq: Continuous PRN Route: XX  PRN Reason: Consult  Start: 06/19/20 1007    Order specific questions:   Consult for Dose daptomycin through 7/14, discontinue IV vancomycin                 PRN Meds Sorted by Name   for Davian Newell as of 06/19/20 1155    Legend:                           Inactive     Active     Linked               Medications 06/19/20 06/20/20 06/21/20 06/22/20 06/23/20 06/24/20 06/25/20   acetaminophen (TYLENOL) tablet 650 mg   Dose: 650 mg  Freq: Every 4 Hours PRN Route: PO  PRN Reason: Mild Pain   Start: 06/16/20 0946    Admin Instructions:   Do not exceed 4 grams of acetaminophen in a 24 hr period.    If given for pain, use the following pain scale:   Mild Pain = Pain Score of 1-3, CPOT 1-2  Moderate Pain = Pain Score of 4-6, CPOT 3-4  Severe Pain = Pain Score of 7-10, CPOT 5-8             Or  acetaminophen (TYLENOL) 160 MG/5ML solution 650 mg   Dose: 650 mg  Freq: Every 4 Hours PRN Route: PO  PRN Reason: Mild Pain   Start: 06/16/20 0946    Admin Instructions:   Do not exceed 4 grams of acetaminophen in a 24 hr period.    If given for pain, use the following pain scale:   Mild Pain = Pain Score of 1-3, CPOT 1-2  Moderate Pain = Pain Score of 4-6, CPOT 3-4  Severe Pain = Pain Score of 7-10, CPOT 5-8             Or  acetaminophen (TYLENOL) suppository 650 mg   Dose: 650 mg  Freq: Every 4 Hours PRN Route: RE  PRN Reason: Mild Pain   Start: 06/16/20 0946    Admin Instructions:   Do not exceed 4 grams of acetaminophen in a 24 hr period.    If given for pain, use the following pain scale:   Mild Pain = Pain Score of 1-3, CPOT 1-2  Moderate Pain = Pain Score of 4-6, CPOT 3-4  Severe Pain = Pain Score of 7-10, CPOT 5-8             dextrose (D50W) 25 g/ 50mL Intravenous  Solution 25 g   Dose: 25 g  Freq: Every 15 Minutes PRN Route: IV  PRN Reason: Low Blood Sugar  PRN Comment: Blood Sugar Less Than 70  Start: 06/16/20 0947    Admin Instructions:   Blood sugar less than 70; patient has IV access - Unresponsive, NPO or Unable To Safely Swallow             dextrose (GLUTOSE) oral gel 15 g   Dose: 15 g  Freq: Every 15 Minutes PRN Route: PO  PRN Reason: Low Blood Sugar  PRN Comment: Blood sugar less than 70  Start: 06/16/20 0947    Admin Instructions:   BS<70, Patient Alert, Is not NPO, Can safely swallow.             glucagon (human recombinant) (GLUCAGEN DIAGNOSTIC) injection 1 mg   Dose: 1 mg  Freq: Every 15 Minutes PRN Route: SC  PRN Reason: Low Blood Sugar  PRN Comment: Blood Glucose Less Than 70  Start: 06/16/20 0947    Admin Instructions:   Blood Glucose Less Than 70 - Patient Without IV Access - Unresponsive, NPO or Unable To Safely Swallow             nitroglycerin (NITROSTAT) SL tablet 0.4 mg   Dose: 0.4 mg  Freq: Every 5 Minutes PRN Route: SL  PRN Reason: Chest Pain  PRN Comment: Only if SBP Greater Than 100  Start: 06/17/20 0953    Admin Instructions:   If Pain Unrelieved After 3 Doses Notify MD             Pharmacy Consult   Freq: Continuous PRN Route: XX  PRN Reason: Consult  Start: 06/19/20 1007    Order specific questions:   Consult for Dose daptomycin through 7/14, discontinue IV vancomycin             sodium chloride 0.9 % flush 10 mL   Dose: 10 mL  Freq: As Needed Route: IV  PRN Reason: Line Care  Start: 06/16/20 0940             Medications 06/19/20 06/20/20 06/21/20 06/22/20 06/23/20 06/24/20 06/25/20

## 2020-06-19 NOTE — PROGRESS NOTES
Discharge Planning Assessment  [facility]     Patient Name: Davian Newell  MRN: [id number]  Today's Date: 6/19/2020    Admit Date: 6/16/2020      Discharge Plan     Row Name 06/19/20 1225       Plan    Plan SS discussed pt with Paulina LEAL who states pt will need IV Rocephin and IV Daptomycin once daily through 7/14/20. SS spoke to pt who wants to go home with home IV antibiotics and states sister that is a RN can assist him at home. SS contacted [facility] [phone] per Tha and faxed referral for IV Rocephin and IV Daptomycin to [phone]. [facility] to call SS back with copays for IV antibiotics. PTBeckie is recommending a rolling walker. RW to be arranged with dr. wen. SS to follow.     SS received order for rolling walker. SS received call from Ingrid at [facility] who states pt does not have a copay for IV antibiotics. [facility] RN to complete teaching with pt and deliver IV antibiotics and supplies to pt's home later this date. Pt is being discharged home today. SS received orders for IV antibiotics and faxed orders to [facility] [phone]. SS spoke to pt who states his sister is agreeable to assist with IV antibiotics and will do his PICC line dressing changes. Pt is unable to get home health services due to not having a PCP at this time. Pt's sister is a RN that works at [employer]. Pt does not have a preference for DME company. SS contacted [facility] [phone] per Vita to arrange RW. SS faxed referral including order for RW to [facility] [phone]. [facility] to deliver RW to hospital. No other needs identified.     SS received call from [facility] per Shahnaz who states they are out of network with pt's insurance. SS contacted [facility] [employer] [place] [phone] per Marcia to arrange RW. SS faxed referral for RW including order to [facility]. [facility] to deliver RW to hospital. No other  needs identified.    EARLENE Moncada

## 2020-06-19 NOTE — DISCHARGE PLACEMENT REQUEST
"Davian Zazueta (60 y.o. Male)     Date of Birth Social Security Number Address Home Phone MRN    1960  [address] [id number] [phone] [id number]    Congregation Marital Status          Judaism Single       Admission Date Admission Type Admitting Provider Attending Provider Department, Room/Bed    20 Elective Daisy Gerard DPM Hacker, Bill J, MD [address], 3343/2S    Discharge Date Discharge Disposition Discharge Destination                       Attending Provider:  Andriy Sutton MD    Allergies:  No Known Allergies    Isolation:  None   Infection:  None   Code Status:  CPR    Ht:  177.8 cm (70\")   Wt:  76.7 kg (169 lb 2 oz)    Admission Cmt:  None   Principal Problem:  Abscess of right foot [L02.611] More...                 Active Insurance as of 2020     Primary Coverage     Payor Plan Insurance Group Employer/Plan Group    CIGNA MISC CIGNA [id number]     Coverage Address Coverage Phone Number Coverage Fax Number Effective Dates    [address] [phone]  2018 - None Entered    [facility] [id number]       Subscriber Name Subscriber Birth Date Member ID       DAVIAN ZAZUETA 1960 [id number]                 Emergency Contacts      (Rel.) Home Phone Work Phone Mobile Phone    GRIS HALL (Sister) [phone] -- --        Maricruz Alston PA-C   Physician Assistant   Infectious Disease   Progress Notes   Cosign Needed   Date of Service:  20   Creation Time:  20            Cosign Needed        Expand All Collapse All     Show:Clear all  [x]Manual[x]Template[x]Copied    Added by:  [x]Maricruz Alston PA-C    []Vidya for details                PROGRESS NOTE           Patient Identification:  Name:  Davian Zazueta  Age:  60 y.o.  Sex:  male  :  1960  MRN:  [id number]  Visit Number:  [id number]  Primary Care Provider:  Provider, No Known            LOS: 3 days         "   ----------------------------------------------------------------------------------------------------------------------  Subjective         Chief Complaints:     No chief complaint on file.           Interval History:       Comfortable this morning sitting at the edge of his bed.  CRP improved almost normal at 0.96 with normal white count. Wound reported to be healing well.     Review of Systems:     Constitutional: no fever, chills and night sweats. No appetite change or unexpected weight change. No fatigue.  Eyes: no eye drainage, itching or redness.  HEENT: no mouth sores, dysphagia or nose bleed.  Respiratory: no for shortness of breath, cough or production of sputum.  Cardiovascular: no chest pain, no palpitations, no orthopnea.  Gastrointestinal: no nausea, vomiting or diarrhea. No abdominal pain, hematemesis or rectal bleeding.  Genitourinary: no dysuria or polyuria.  Hematologic/lymphatic: no lymph node abnormalities, no easy bruising or easy bleeding.  Musculoskeletal: no muscle or joint pain.  Skin: No rash and no itching.  Neurological: no loss of consciousness, no seizure, no headache.  Behavioral/Psych: no depression or suicidal ideation.  Endocrine: no hot flashes.  Immunologic: negative.  ----------------------------------------------------------------------------------------------------------------------        Objective         Current [facility] Meds:     cefTRIAXone 2 g Intravenous Q24H   heparin (porcine) 5,000 Units Subcutaneous Q8H   insulin aspart 0-9 Units Subcutaneous TID AC   lactobacillus acidophilus 1 capsule Oral Daily   pantoprazole 40 mg Oral Q AM   sodium chloride 10 mL Intravenous Q12H   vancomycin 1,500 mg Intravenous Q12H      ----------------------------------------------------------------------------------------------------------------------     Vital Signs:  Temp:  [97.5 °F (36.4 °C)-98.5 °F (36.9 °C)] 97.7 °F (36.5 °C)  Heart Rate:  [50-56] 50  Resp:  [18] 18  BP:  (118-141)/(56-71) 141/68  No data found.        SpO2 Percentage     06/18/20 1510 06/18/20 1944 06/19/20 0634   SpO2: 98% 97% 98%      SpO2:  [97 %-98 %] 98 %  on   ;   Device (Oxygen Therapy): room air     Body mass index is 24.27 kg/m².      Wt Readings from Last 3 Encounters:   06/16/20 76.7 kg (169 lb 2 oz)   06/05/20 81.6 kg (180 lb)   11/16/19 84.4 kg (186 lb)         Intake/Output Summary (Last 24 hours) at 6/19/2020 0937  Last data filed at 6/19/2020 0400      Gross per 24 hour   Intake 2020.06 ml   Output --   Net 2020.06 ml      Diet Regular; Consistent Carbohydrate  ----------------------------------------------------------------------------------------------------------------------        Physical Exam:     Constitutional:  Well-developed and well-nourished.  No respiratory distress.      HENT:  Head: Normocephalic and atraumatic.  Mouth:  Moist mucous membranes.    Eyes:  Conjunctivae and EOM are normal.  No scleral icterus.  Neck:  Neck supple.  No JVD present.    Cardiovascular:  Normal rate, regular rhythm and normal heart sounds with no murmur. No edema.  Pulmonary/Chest:  No respiratory distress, no wheezes, no crackles, with normal breath sounds and good air movement.  Abdominal:  Soft.  Bowel sounds are normal.  No distension and no tenderness.   Musculoskeletal: Right foot in surgical dressing  Neurological:  Alert and oriented to person, place, and time.  No facial droop.  No slurred speech.   Skin:  Skin is warm and dry.  No rash noted.  No pallor.   Psychiatric:  Normal mood and affect.  Behavior is normal.        ----------------------------------------------------------------------------------------------------------------------                     Results from last 7 days   Lab Units 06/19/20  0538 06/18/20  0044 06/17/20  0427 06/16/20  1334 06/16/20  0934   CRP mg/dL 0.96*  --   --   --  1.07*   LACTATE mmol/L  --   --   --  1.7  --    WBC 10*3/mm3 4.65 5.63 6.66  --  5.11   HEMOGLOBIN  g/dL 11.8* 11.7* 11.6*  --  13.4   HEMATOCRIT % 36.9* 36.7* 37.0*  --  41.6   MCV fL 91.8 92.4 93.4  --  92.0   MCHC g/dL 32.0 31.9 31.4*  --  32.2   PLATELETS 10*3/mm3 407 419 416  --  496*              Results from last 7 days   Lab Units 06/19/20  0538 06/18/20  0044 06/17/20  0427 06/16/20  1334   SODIUM mmol/L 138 137 136 137   POTASSIUM mmol/L 4.0 3.9 4.2 4.7   MAGNESIUM mg/dL 2.1 2.2 2.1  --    CHLORIDE mmol/L 106 106 106 104   CO2 mmol/L 23.3 22.2 21.9* 24.2   BUN mg/dL 12 13 15 19   CREATININE mg/dL 0.70* 0.72* 0.81 0.93   EGFR IF NONAFRICN AM mL/min/1.73 115 111 97 83   CALCIUM mg/dL 8.7 8.6 8.6 8.9   GLUCOSE mg/dL 141* 115* 125* 178*   ALBUMIN g/dL  --   --   --  3.07*   BILIRUBIN mg/dL  --   --   --  0.2   ALK PHOS U/L  --   --   --  77   AST (SGOT) U/L  --   --   --  16   ALT (SGPT) U/L  --   --   --  12   Estimated Creatinine Clearance: 121.7 mL/min (A) (by C-G formula based on SCr of 0.7 mg/dL (L)).  No results found for: AMMONIA           Glucose   Date/Time Value Ref Range Status   06/19/2020 0635 128 70 - 130 mg/dL Final   06/18/2020 2320 121 70 - 130 mg/dL Final   06/18/2020 1643 172 (H) 70 - 130 mg/dL Final   06/18/2020 1041 148 (H) 70 - 130 mg/dL Final   06/18/2020 0658 144 (H) 70 - 130 mg/dL Final   06/17/2020 2153 142 (H) 70 - 130 mg/dL Final   06/17/2020 1710 133 (H) 70 - 130 mg/dL Final   06/17/2020 1112 123 70 - 130 mg/dL Final            Lab Results   Component Value Date     HGBA1C 9.80 (H) 06/05/2020            Lab Results   Component Value Date     TSH 2.960 06/16/2020     FREET4 1.15 06/16/2020               Blood Culture   Date Value Ref Range Status   06/16/2020 No growth at 2 days   Preliminary   06/16/2020 No growth at 2 days   Preliminary      No results found for: URINECX        Wound Culture   Date Value Ref Range Status   06/16/2020 No growth at 3 days   Final   06/16/2020 Rare Normal Skin Winter   Final      No results found for: STOOLCX  No results found for: RESPCX      Pain  Management Panel         There is no flowsheet data to display.                   ----------------------------------------------------------------------------------------------------------------------      Imaging Results (Last 24 Hours)      ** No results found for the last 24 hours. **             ----------------------------------------------------------------------------------------------------------------------     Assessment/Plan            Assessment/Plan         ASSESSMENT:     1. Osteomyelitis of the right foot     PLAN:     Comfortable this morning sitting at the edge of his bed.  CRP improved almost normal at 0.96 with normal white count. Wound reported to be healing well.     Sp incision and drainage on 6/16/2020 and feel he would benefit from admission for IV antibiotic therapy and a bone scan to rule out underlying osteomyelitis.  Wound culture from 6/5/2020 showing growth of group B strep with intraoperative cultures from 6/16/2020 in progress.  CRP low at 1.07 with normal white count.  MRI of the right foot does not suggest osteomyelitis at this time as well as no soft tissue abscess.     Case discussed with Dr. Sutton and Dr. Gerard.  As there is concern for osteomyelitis with recurrent infection would recommend to treat as such for a prolonged course of 4 weeks.  Based on previous cultures would change antibiotic therapy to high-dose Rocephin 2 g IV every 24 hours and Daptomycin per pharmacy dosing for easier outpatient administration.         Code Status:       Code Status and Medical Interventions:   Ordered at: 06/16/20 3300     Code Status:     CPR     Medical Interventions (Level of Support Prior to Arrest):     Full         Maricruz Alston, PA-C  06/19/20  09:37                        History & Physical      Jayleen Knutson PA-C at 06/16/20 1100     Attestation signed by Andriy Sutton MD at 06/16/20 8501    I have reviewed and agree with the above documentation.     Mr. Newell is our  "61 yo M with hx of recently diagnosed DM, GERD who presented to podiatry today for recurrent right foot infection. Patient noted taking scab off after his last admission and not being sterile including getting \"lake water\" in the wound from [facility]. He reports since then it has got red warm and drainage has increased. He was recently admitted to our facility and was started on treatment for newly diagnosed DM II with metformin. He was also sent home with a week of Augmentin and doxycyline. Hospital medicine was called for admission after surgical debridement by podiatry for treatment of recurrent foot infection that failed outpatient treatment.     Exam:  Gen: NAD, resting supine in hospital bed.   HEENT: Neck supple. No JVD. Mucosa moist.   CV: RRR. No M/R/G  Pulm: CTAB, no wheezes or crackles   MSK: No joint swelling or redness   GI: Abdomen soft, nontender  Skin: No rashes or lumps noted.   EXT: Right foot bandage in place. No surrounding redness or warmth. No drainage or bleeding though noted.   Neuro: No focal neurological deficits, AAOX3  Psych: Blunted affect     A/P:    #Recurrent right foot infection   #Uncontrolled recently diagnosed DM II  #Asymptomatic sinus bradycardia     Patient not septic, WBC normal, afebrile, and CRP much improved since last admission. Depth of wound unclear but osteo not appreciated during debridement. Given persistence of wound will rule out osteo with bone scan. If foot scan concerning for osteomyelitis, will involve infectious disease. Will start cefipime, vanc, and flagyl. Intraoperative wound culture obtained. Suspect ulcers are diabetic foot wounds in nature, however, will get ABIs to rule out PAD.                          [facility]  History & Physical    Patient Identification:  Name:  Davian Newell  Age:  60 y.o.  Sex:  male  :  1960  MRN:  [id number]   Visit Number:  [id number]  Primary Care Physician:  Provider, No " Known    Subjective     Chief complaint: Cellulitis and possible osteomyelitis    History of presenting illness:      Davian Newell is a 60 y.o. male with past medical history significant for diabetes mellitus, heartburn, arthritis.  He initially presented to this facility today for outpatient surgery with Dr. Daisy Gerard.  He was recently hospitalized at this facility from June 5 through June 7, 2020 with right foot cellulitis and hyperglycemia with newly diagnosed diabetes mellitus type 2.  He was discharged with Augmentin, doxycycline, lactobacillus, and started on metformin in addition to being provided with a glucometer device kit.  Initial CT of the right foot during said admission was negative for known evidence of osteomyelitis.  He did complete outpatient Augmentin and doxycycline.  He followed up with  on June 12, 2020 and had purulent drainage with suspected abscess in the right foot observed.  He was then scheduled for incision and drainage of the right foot on June 16, 2020.  He presented to this facility on this date for this outpatient procedure.  It was felt that he would benefit from admission following this procedure for IV antibiotics and a bone scan to rule out underlying osteomyelitis.      Upon arrival to the outpatient surgery worsen of this facility, his vital signs were temperature 97.8 °F, pulse 61, respiration rate 20, and blood pressure 137/71.  Labs revealed white blood cell count of 5.11 with C-reactive protein of 1.07. Blood cultures were performed and pending and wound culture was performed as well.      Mr. Newell was evaluated by Jayleen Knutson PA-C in room 343B post-operatively. He reports he completed oral antibiotics and started Metformin.  He reports some mild diarrhea with both.  He denies chest pain, dyspnea, cough, and wheeze.  He reports worsening right foot redness and drainage prior to presentation. I am unable to see this during my exam as his right foot has  just been dressed post-op.      ---------------------------------------------------------------------------------------------------------------------   Review of Systems   Constitutional: Negative for chills, fatigue and fever.   HENT: Negative for congestion and drooling.    Eyes: Negative for pain and discharge.   Respiratory: Negative for cough, chest tightness and shortness of breath.    Cardiovascular: Negative for chest pain and leg swelling.   Gastrointestinal: Negative for abdominal distention, diarrhea and vomiting.   Endocrine: Negative for cold intolerance and heat intolerance.   Genitourinary: Negative for difficulty urinating and dysuria.   Musculoskeletal: Negative for arthralgias and back pain.   Skin: Positive for color change and wound.   Allergic/Immunologic: Negative for environmental allergies and food allergies.   Neurological: Negative for dizziness and headaches.   Psychiatric/Behavioral: Negative for agitation and confusion.        ---------------------------------------------------------------------------------------------------------------------   Past Medical History:   Diagnosis Date   • Arthritis    • Diabetes mellitus (CMS/[facility])    • Heartburn      Past Surgical History:   Procedure Laterality Date   • ANKLE ARTHROSCOPY     • COLONOSCOPY     • FRACTURE SURGERY       History reviewed. No pertinent family history.  Social History     Socioeconomic History   • Marital status: Single     Spouse name: Not on file   • Number of children: Not on file   • Years of education: Not on file   • Highest education level: Not on file   Tobacco Use   • Smoking status: Never Smoker   • Smokeless tobacco: Never Used   Substance and Sexual Activity   • Alcohol use: Yes   • Drug use: No   • Sexual activity: Defer     ---------------------------------------------------------------------------------------------------------------------   Allergies:  Patient has no known  allergies.  ---------------------------------------------------------------------------------------------------------------------   Home medications:    Medications below are reported home medications pulling from within the system; at this time, these medications have not been reconciled unless otherwise specified and are in the verification process for further verifcation as current home medications.  Medications Prior to Admission   Medication Sig Dispense Refill Last Dose   • metFORMIN (GLUCOPHAGE) 500 MG tablet Take 1 tablet by mouth 2 (Two) Times a Day With Meals. 60 tablet 0 6/16/2020 at 0800       [facility] Scheduled Meds:    cefepime 2 g Intravenous Once   cefepime 2 g Intravenous Q8H   heparin (porcine) 5,000 Units Subcutaneous Q8H   insulin aspart 0-9 Units Subcutaneous TID AC   metroNIDAZOLE 500 mg Intravenous Q8H   sodium chloride 10 mL Intravenous Q12H       lactated ringers 125 mL/hr Last Rate: Stopped (06/16/20 1116)   Pharmacy to dose vancomycin         Current listed hospital scheduled medications may not yet reflect those currently placed in orders that are signed and held awaiting patient's arrival to floor.   ---------------------------------------------------------------------------------------------------------------------     Objective     Vital Signs:  Temp:  [97.3 °F (36.3 °C)-97.8 °F (36.6 °C)] 97.7 °F (36.5 °C)  Heart Rate:  [47-61] 50  Resp:  [8-20] 18  BP: (100-137)/(65-73) 125/71      06/16/20  0900   Weight: 76.7 kg (169 lb 2 oz)     Body mass index is 24.27 kg/m².  ---------------------------------------------------------------------------------------------------------------------       Physical Exam   Constitutional: He is oriented to person, place, and time. He appears well-developed and well-nourished.   HENT:   Head: Normocephalic and atraumatic.   Eyes: Pupils are equal, round, and reactive to light. EOM are normal.   Neck: Normal range of motion. Neck supple.   Cardiovascular:  Normal rate and regular rhythm. Exam reveals no friction rub.   No murmur heard.  Pulmonary/Chest: Effort normal and breath sounds normal. No respiratory distress.   Abdominal: Soft. Bowel sounds are normal.   Musculoskeletal:   KOFI wrapped post-operatively   Neurological: He is alert and oriented to person, place, and time.   Skin: Skin is warm and dry.   Psychiatric: He has a normal mood and affect. His behavior is normal.   Nursing note and vitals reviewed.            ---------------------------------------------------------------------------------------------------------------------  EKG:      EKG added & cardiac monitoring added given low pulse rates in the 40s          ---------------------------------------------------------------------------------------------------------------------   Results from last 7 days   Lab Units 06/16/20  0934   CRP mg/dL 1.07*   WBC 10*3/mm3 5.11   HEMOGLOBIN g/dL 13.4   HEMATOCRIT % 41.6   MCV fL 92.0   MCHC g/dL 32.2   PLATELETS 10*3/mm3 496*         Results from last 7 days   Lab Units 06/16/20  0934   SODIUM mmol/L 137   POTASSIUM mmol/L 4.6   CHLORIDE mmol/L 103   CO2 mmol/L 25.9   BUN mg/dL 19   CREATININE mg/dL 0.86   EGFR IF NONAFRICN AM mL/min/1.73 91   CALCIUM mg/dL 9.5   GLUCOSE mg/dL 131*   Estimated Creatinine Clearance: 99.1 mL/min (by C-G formula based on SCr of 0.86 mg/dL).  No results found for: AMMONIA          Lab Results   Component Value Date    HGBA1C 9.80 (H) 06/05/2020     Lab Results   Component Value Date    TSH 2.960 06/16/2020    FREET4 1.15 06/16/2020     No results found for: PREGTESTUR, PREGSERUM, HCG, HCGQUANT  Pain Management Panel     There is no flowsheet data to display.        No results found for: BLOODCX  No results found for: URINECX  No results found for: WOUNDCX  No results found for: STOOLCX      ---------------------------------------------------------------------------------------------------------------------  Imaging Results (Last 7  Days)     ** No results found for the last 168 hours. **          Cultures:  No results found for: BLOODCX, URINECX, WOUNDCX, MRSACX, RESPCX, STOOLCX    Last echocardiogram:               I have personally reviewed the above radiology images and read the final radiology report on 06/16/20  ---------------------------------------------------------------------------------------------------------------------  Assessment / Plan     Active Hospital Problems    Diagnosis POA   • **Abscess of right foot [L02.611] Unknown     Added automatically from request for surgery [id number]         ASSESSMENT/PLAN:  · Right foot cellulitis with abscess and possible osteomyelitis: Bone scan has been ordered. Blood cultures performed and wound culture performed. Will monitor daily CBC & C-RP. IV cefepime, flagyl, and vancomycin ordered for now. Wound culture from earlier this month reviewed and available within this document with growth of Streptococcus agalactiea.  NM bone scan has been ordered.     · Diabetes Mellitus type 2, NID: FSBG ACHS with SSI PRN ordered.  Hemoglobin a1c was 9.8 on 6/5/2020.     · Low heart rates with concern for sinus bradycardia: EKG added x1.  Cardiac monitoring added.   Will check TSH/free t4. Denies chest pain or dizziness.        ----------  -DVT prophylaxis: SQ Heparin  -Activity: Per podiatry  -Expected length of stay:   OBSERVATION status; however, if further evaluation or treatment plans warrant, status may change.  Based upon current information, I predict patient's care encounter to be less than or equal to 2 midnights          High risk secondary to right foot cellulitis with concern for limb threatening osteomyelitis in the setting of newly diagnosed diabetes mellitus type 2.    Jayleen Knutson PA-C  06/16/20  13:08  Pager # [phone]  ---------------------------------------------------------------------------------------------------------------------             Electronically signed by  Andriy Sutton MD at 06/16/20 1545     Daisy Gerard DPM at 06/16/20 0386          PODIATRIC SURGERY  History and Physical      Principal problem: Abscess of right foot    Subjective .     History of present illness:    Mr. Davian Newell is a 60 y.o. years old male with right foot infection. He was recently diagnosed with diabetes, HbA1C 9.8%, and started on metformin about 2 weeks ago. About three weeks ago he developed a callus on the lateral aspect of his right foot, he ripped it off and experienced progressing redness, swelling, warmth, and pain. He was admitted for IV antibiotics from 6/5 to 6/7 for treatment of cellulitis. A CT right foot during that admission was negative for osteomyelitis. He was discharged with one week PO Augmentin and Doxycycline, which he did complete. Upon outpatient follow up, purulent drainage with suspected abscess in the right foot was observed, he was then scheduled for incision and drainage of the right foot. An admission following surgery is planned for IV antibiotics, bone scan to rule out osteomyelitis, and possible infectious disease consult.     History taken from: patient    Case was discussed with patient    Review of Systems    Constitutional: no fever, chills and night sweats. No appetite change or unexpected weight change. No fatigue.  Eyes: no eye drainage, itching or redness.  HEENT: no mouth sores, dysphagia or nose bleed.  Respiratory: no for shortness of breath, cough or production of sputum.  Cardiovascular: no chest pain, no palpitations, no orthopnea.  Gastrointestinal: no nausea, vomiting or diarrhea. No abdominal pain, hematemesis or rectal bleeding.  Genitourinary: no dysuria or polyuria.  Hematologic/lymphatic: no lymph node abnormalities, no easy bruising or easy bleeding.  Musculoskeletal: no muscle or joint pain.  Skin: No rash and no itching.  Neurological: no loss of consciousness, no seizure, no headache.  Behavioral/Psych: no depression or  "suicidal ideation.  Endocrine: no hot flashes.  Immunologic: negative.    Past Medical History    Past Medical History:   Diagnosis Date   • Arthritis    • Diabetes mellitus (CMS/HCC)    • Heartburn        Past Surgical History    Past Surgical History:   Procedure Laterality Date   • ANKLE ARTHROSCOPY     • COLONOSCOPY     • FRACTURE SURGERY         Family History    History reviewed. No pertinent family history.    Social History    Social History     Tobacco Use   • Smoking status: Never Smoker   • Smokeless tobacco: Never Used   Substance Use Topics   • Alcohol use: Yes   • Drug use: No       Allergies    Patient has no known allergies.    Objective     /71 (BP Location: Left arm, Patient Position: Lying)   Pulse 61   Temp 97.8 °F (36.6 °C) (Oral)   Resp 20   Ht 177.8 cm (70\")   Wt 76.7 kg (169 lb 2 oz)   SpO2 99%   BMI 24.27 kg/m²      Temp:  [97.8 °F (36.6 °C)] 97.8 °F (36.6 °C)      No intake or output data in the 24 hours ending 06/16/20 0923      Physical Exam:     General Appearance:    Alert, cooperative, in no acute distress   Head:    Normocephalic, without obvious abnormality, atraumatic    Heart:    Regular rhythm and normal rate     Chest Wall:    No abnormalities observed   Abdomen:    Soft non-tender, non-distended, no guarding, no rebound                tenderness   Extremities:   Moves all extremities well, no cyanosis. Right plantar lateral foot multiple ulcerations with purulent drainage. Cellulitis extending to ankle.   Pulses:   Pulses palpable and equal bilaterally   Skin:   No bleeding, bruising or rash   Lymph nodes:   No palpable adenopathy                   Lab Results   Component Value Date    NEUTROABS 6.04 06/07/2020                   Imaging Results (Last 24 Hours)     ** No results found for the last 24 hours. **            Results Review:    I have personally reviewed laboratory data, culture results, radiology studies and antimicrobial therapy.    Hospital Medications " (active)       Dose Frequency Start End    lactated ringers infusion 125 mL/hr Continuous 6/16/2020     Route: Intravenous    sodium chloride 0.9 % flush 10 mL 10 mL Every 12 Hours Scheduled 6/16/2020     Route: Intravenous    sodium chloride 0.9 % flush 10 mL 10 mL As Needed 6/16/2020     Route: Intravenous    vancomycin 1 g/250 mL 0.9% NS (vial-mate) 1,000 mg Once 6/16/2020     Route: Intravenous            PROBLEM LIST:    Patient Active Problem List   Diagnosis   • Cellulitis of foot, right   • Hyperglycemia   • Abscess of right foot       Assessment/Plan     ASSESSMENT:    60 year old male with right foot ulcerations and abscess    PLAN:    -Proceeding with incision and drainage after thorough discussion of all alternatives, risks, and benefits.      Patients findings and recommendations were discussed with patient    Code Status:   There are no questions and answers to display.       Daisy Gerard DPM  06/16/20  09:23            Electronically signed by Daisy Gerard DPM at 06/16/20 0938         Current Facility-Administered Medications   Medication Dose Route Frequency Provider Last Rate Last Dose   • acetaminophen (TYLENOL) tablet 650 mg  650 mg Oral Q4H PRN Andriy Sutton MD        Or   • acetaminophen (TYLENOL) 160 MG/5ML solution 650 mg  650 mg Oral Q4H PRN Andriy Sutton MD        Or   • acetaminophen (TYLENOL) suppository 650 mg  650 mg Rectal Q4H PRN Andriy Sutton MD       • cefTRIAXone (ROCEPHIN) 2 g/100 mL 0.9% NS VTB (PRAKASH)  2 g Intravenous Q24H MARTINE'Paulina Salazar PA       • DAPTOmycin (CUBICIN) 450 mg in sodium chloride 0.9 % 50 mL IVPB  6 mg/kg Intravenous Q24H O'Paulina Salazar PA       • dextrose (D50W) 25 g/ 50mL Intravenous Solution 25 g  25 g Intravenous Q15 Min PRN Andriy Sutton MD       • dextrose (GLUTOSE) oral gel 15 g  15 g Oral Q15 Min PRN Andriy Sutton MD       • glucagon (human recombinant) (GLUCAGEN DIAGNOSTIC) injection 1 mg  1 mg Subcutaneous Q15 Min PRN Hacker, Bill  MD TYLOR       • heparin (porcine) 5000 UNIT/ML injection 5,000 Units  5,000 Units Subcutaneous Q8H Andriy Sutton MD   5,000 Units at 06/19/20 0508   • insulin aspart (novoLOG) injection 0-9 Units  0-9 Units Subcutaneous TID AC Andriy Sutton MD   2 Units at 06/18/20 1706   • lactobacillus acidophilus (RISAQUAD) capsule 1 capsule  1 capsule Oral Daily Jayleen Fuentes, [facility]   1 capsule at 06/19/20 0925   • nitroglycerin (NITROSTAT) SL tablet 0.4 mg  0.4 mg Sublingual Q5 Min PRN Paulina Yip PA       • pantoprazole (PROTONIX) EC tablet 40 mg  40 mg Oral Q AM O'Paulina Salazar PA   40 mg at 06/19/20 0509   • Pharmacy Consult   Does not apply Continuous PRN Paulina Yip PA       • sodium chloride 0.9 % flush 10 mL  10 mL Intravenous Q12H Andriy Sutton MD   10 mL at 06/19/20 0925   • sodium chloride 0.9 % flush 10 mL  10 mL Intravenous PRN Andriy Sutton MD         Lab Results (most recent)     Procedure Component Value Units Date/Time    POC Glucose Once [[id number]]  (Abnormal) Collected:  06/19/20 1043    Specimen:  Blood Updated:  06/19/20 1049     Glucose 138 mg/dL     Blood Culture - Blood, Arm, Right [[id number]] Collected:  06/16/20 0934    Specimen:  Blood from Arm, Right Updated:  06/19/20 0946     Blood Culture No growth at 3 days    Blood Culture - Blood, Arm, Left [[id number]] Collected:  06/16/20 0934    Specimen:  Blood from Arm, Left Updated:  06/19/20 0946     Blood Culture No growth at 3 days    Wound Culture - Swab, Foot, Right [[id number]] Collected:  06/16/20 1122    Specimen:  Swab from Foot, Right Updated:  06/19/20 0847     Wound Culture No growth at 3 days     Gram Stain Rare (1+) WBCs seen      No organisms seen    Wound Culture - Wound, Foot, Right [[id number]] Collected:  06/16/20 1000    Specimen:  Wound from Foot, Right Updated:  06/19/20 0759     Wound Culture Rare Normal Skin Winter     Gram Stain Few (2+) WBCs seen      No organisms seen    C-reactive Protein [[id number]]   (Abnormal) Collected:  06/19/20 0538    Specimen:  Blood Updated:  06/19/20 0732     C-Reactive Protein 0.96 mg/dL     POC Glucose Once [[id number]]  (Normal) Collected:  06/19/20 0635    Specimen:  Blood Updated:  06/19/20 0641     Glucose 128 mg/dL     Basic Metabolic Panel [[id number]]  (Abnormal) Collected:  06/19/20 0538    Specimen:  Blood Updated:  06/19/20 0612     Glucose 141 mg/dL      BUN 12 mg/dL      Creatinine 0.70 mg/dL      Sodium 138 mmol/L      Potassium 4.0 mmol/L      Chloride 106 mmol/L      CO2 23.3 mmol/L      Calcium 8.7 mg/dL      eGFR Non African Amer 115 mL/min/1.73      BUN/Creatinine Ratio 17.1     Anion Gap 8.7 mmol/L     Narrative:       GFR Normal >60  Chronic Kidney Disease <60  Kidney Failure <15      Magnesium [[id number]]  (Normal) Collected:  06/19/20 0538    Specimen:  Blood Updated:  06/19/20 0612     Magnesium 2.1 mg/dL     Phosphorus [[id number]]  (Normal) Collected:  06/19/20 0538    Specimen:  Blood Updated:  06/19/20 0612     Phosphorus 2.8 mg/dL     CBC & Differential [[id number]] Collected:  06/19/20 0538    Specimen:  Blood Updated:  06/19/20 0548    Narrative:       The following orders were created for panel order CBC & Differential.  Procedure                               Abnormality         Status                     ---------                               -----------         ------                     CBC Auto Differential[[id number]]        Abnormal            Final result                 Please view results for these tests on the individual orders.    CBC Auto Differential [[id number]]  (Abnormal) Collected:  06/19/20 0538    Specimen:  Blood Updated:  06/19/20 0548     WBC 4.65 10*3/mm3      RBC 4.02 10*6/mm3      Hemoglobin 11.8 g/dL      Hematocrit 36.9 %      MCV 91.8 fL      MCH 29.4 pg      MCHC 32.0 g/dL      RDW 12.0 %      RDW-SD 40.6 fl      MPV 9.5 fL      Platelets 407 10*3/mm3      Neutrophil % 45.1 %      Lymphocyte % 37.0 %      Monocyte % 14.2 %       Eosinophil % 2.6 %      Basophil % 0.9 %      Immature Grans % 0.2 %      Neutrophils, Absolute 2.10 10*3/mm3      Lymphocytes, Absolute 1.72 10*3/mm3      Monocytes, Absolute 0.66 10*3/mm3      Eosinophils, Absolute 0.12 10*3/mm3      Basophils, Absolute 0.04 10*3/mm3      Immature Grans, Absolute 0.01 10*3/mm3      nRBC 0.0 /100 WBC     Vancomycin, Trough [[id number]]  (Normal) Collected:  06/18/20 0917    Specimen:  Blood Updated:  06/18/20 1006     Vancomycin Trough 11.00 mcg/mL     Basic Metabolic Panel [[id number]]  (Abnormal) Collected:  06/18/20 0044    Specimen:  Blood Updated:  06/18/20 0151     Glucose 115 mg/dL      BUN 13 mg/dL      Creatinine 0.72 mg/dL      Sodium 137 mmol/L      Potassium 3.9 mmol/L      Chloride 106 mmol/L      CO2 22.2 mmol/L      Calcium 8.6 mg/dL      eGFR Non African Amer 111 mL/min/1.73      BUN/Creatinine Ratio 18.1     Anion Gap 8.8 mmol/L     Narrative:       GFR Normal >60  Chronic Kidney Disease <60  Kidney Failure <15      Phosphorus [[id number]]  (Normal) Collected:  06/18/20 0044    Specimen:  Blood Updated:  06/18/20 0151     Phosphorus 3.3 mg/dL     Magnesium [[id number]]  (Normal) Collected:  06/18/20 0044    Specimen:  Blood Updated:  06/18/20 0151     Magnesium 2.2 mg/dL     CBC & Differential [[id number]] Collected:  06/18/20 0044    Specimen:  Blood Updated:  06/18/20 0130    Narrative:       The following orders were created for panel order CBC & Differential.  Procedure                               Abnormality         Status                     ---------                               -----------         ------                     CBC Auto Differential[[id number]]        Abnormal            Final result                 Please view results for these tests on the individual orders.    CBC Auto Differential [[id number]]  (Abnormal) Collected:  06/18/20 0044    Specimen:  Blood Updated:  06/18/20 0130     WBC 5.63 10*3/mm3      RBC 3.97 10*6/mm3      Hemoglobin 11.7 g/dL       Hematocrit 36.7 %      MCV 92.4 fL      MCH 29.5 pg      MCHC 31.9 g/dL      RDW 12.0 %      RDW-SD 40.6 fl      MPV 10.1 fL      Platelets 419 10*3/mm3      Neutrophil % 48.1 %      Lymphocyte % 36.2 %      Monocyte % 11.9 %      Eosinophil % 2.7 %      Basophil % 0.7 %      Immature Grans % 0.4 %      Neutrophils, Absolute 2.71 10*3/mm3      Lymphocytes, Absolute 2.04 10*3/mm3      Monocytes, Absolute 0.67 10*3/mm3      Eosinophils, Absolute 0.15 10*3/mm3      Basophils, Absolute 0.04 10*3/mm3      Immature Grans, Absolute 0.02 10*3/mm3      nRBC 0.0 /100 WBC     Comprehensive Metabolic Panel [[id number]]  (Abnormal) Collected:  06/16/20 1334    Specimen:  Blood Updated:  06/16/20 1409     Glucose 178 mg/dL      BUN 19 mg/dL      Creatinine 0.93 mg/dL      Sodium 137 mmol/L      Potassium 4.7 mmol/L      Chloride 104 mmol/L      CO2 24.2 mmol/L      Calcium 8.9 mg/dL      Total Protein 7.4 g/dL      Albumin 3.07 g/dL      ALT (SGPT) 12 U/L      AST (SGOT) 16 U/L      Alkaline Phosphatase 77 U/L      Total Bilirubin 0.2 mg/dL      eGFR Non African Amer 83 mL/min/1.73      Globulin 4.3 gm/dL      A/G Ratio 0.7 g/dL      BUN/Creatinine Ratio 20.4     Anion Gap 8.8 mmol/L     Narrative:       GFR Normal >60  Chronic Kidney Disease <60  Kidney Failure <15      Lactic Acid, Plasma [[id number]]  (Normal) Collected:  06/16/20 1334    Specimen:  Blood Updated:  06/16/20 1407     Lactate 1.7 mmol/L     TSH [[id number]]  (Normal) Collected:  06/16/20 0934    Specimen:  Blood Updated:  06/16/20 1254     TSH 2.960 uIU/mL     T4, Free [[id number]]  (Normal) Collected:  06/16/20 0934    Specimen:  Blood Updated:  06/16/20 1254     Free T4 1.15 ng/dL     Narrative:       Results may be falsely increased if patient taking Biotin.      Sedimentation Rate [[id number]]  (Abnormal) Collected:  06/16/20 0934    Specimen:  Blood Updated:  06/16/20 1041     Sed Rate 60 mm/hr     C-reactive Protein [[id number]]  (Abnormal) Collected:   06/16/20 0934    Specimen:  Blood Updated:  06/16/20 1036     C-Reactive Protein 1.07 mg/dL              Operative/Procedure Notes (most recent note)      Daisy Gerard DPM at 06/16/20 1107        Davian JELLY Newell  6/16/2020      Operative Progress Note:    Surgeon and Assistant: Dr. Daisy Gerard DPM    Pre-Operative Diagnosis:   1.  Right foot abscess and cellulitis  2.  Right foot diabetic ulceration    Post-Operative Diagnosis:   1.  Right foot abscess and cellulitis  2.  Right foot diabetic ulceration    Procedure(s):  Right foot incision and drainage below fascia    Type of Anesthesia Administered: General with 10mL ankle block 0.5% marcaine    Estimated Blood Loss: 2mL    Hemostasis: ankle tourniquet inflated to 250mmHg    Blood Products: None    Specimen Obtained/Removed: Swab culture sent to pathology    Complication(s):  None    Graft/Implant/Prosthetics/Implanted Device/Transplants: None    Indication: 60 year old male with non-healing diabetic ulceration lateral right foot. He has had recurrent signs of retained fluid and abscess after both IV and oral antibiotics and bedside irrigation. He presents today for surgical incision and drainage of the right foot after thorough discussion of all alternatives, risks and benefits.    Findings: Right foot multiple plantar lateral 5th metatarsal ulcerations, largest at the metatarsal head 1.5cm x 1cm. Purulent drainage tracking to the plantar midfoot. No signs of bone necrosis.    Operative Report:  Patient was identified in the preoperative holding area formal consent was signed and the right lower extremity was marked as correct site.  Patient was brought to the operating suite and placed in the operating table in the supine position.  Timeout was performed he then he underwent general anesthesia.  A well-padded ankle tourniquet was applied to the right.  The right lower extremity was then scrubbed prepped and draped in the usual sterile manner.  Attention was  then focused to the plantar lateral fifth metatarsal but there is noted to be multiple ulcerations as well as cellulitis extending both dorsally and plantarly to the ankle.  The foot was elevated and the tourniquet was inflated.  Approximately 4 cm incision was made at the plantar lateral aspect of the fifth metatarsal using a 15 blade.  Hemostats were used to explore the wound there is noted to be no tracking on the dorsal aspect of the foot but there was 5 to 6 cm tracking to the plantar midfoot with approximately 2 mL of purulent fluid expressed from this area.  There were no signs of bone necrosis.  Swab culture was taken of the plantar foot.  The surgical site was then copiously irrigated with a mixture of sterile saline, bacitracin, polymyxin.  Vancomycin powder was then applied to the surgical site.  2-0 nylon retention sutures were placed.  And the incision was packed with Betadine soaked quarter inch packing.  Ankle block was performed at this time.  Dry sterile dressing was applied, the tourniquet was deflated, there was noted to be brisk capillary refill to all digits.  The patient was extubated and transferred to PACU with all vital signs stable. Patient will be admitted under the care of the hospitalist for continuation of IV antibiotics as well as bone scan to evaluate for osteomyelitis.      Electronically Signed by: Daisy Gerard DPM        Dictated Utilizing Dragon Dictation      Electronically signed by Daisy Gerard DPM at 20 1138          Physician Progress Notes (most recent note)      Maricruz Alston PA-C at 20 0937                     PROGRESS NOTE         Patient Identification:  Name:  Davian Newell  Age:  60 y.o.  Sex:  male  :  1960  MRN:  [id number]  Visit Number:  [id number]  Primary Care Provider:  Provider, No Known         LOS: 3 days        ----------------------------------------------------------------------------------------------------------------------  Subjective       Chief Complaints:    No chief complaint on file.        Interval History:      Comfortable this morning sitting at the edge of his bed.  CRP improved almost normal at 0.96 with normal white count. Wound reported to be healing well.    Review of Systems:    Constitutional: no fever, chills and night sweats. No appetite change or unexpected weight change. No fatigue.  Eyes: no eye drainage, itching or redness.  HEENT: no mouth sores, dysphagia or nose bleed.  Respiratory: no for shortness of breath, cough or production of sputum.  Cardiovascular: no chest pain, no palpitations, no orthopnea.  Gastrointestinal: no nausea, vomiting or diarrhea. No abdominal pain, hematemesis or rectal bleeding.  Genitourinary: no dysuria or polyuria.  Hematologic/lymphatic: no lymph node abnormalities, no easy bruising or easy bleeding.  Musculoskeletal: no muscle or joint pain.  Skin: No rash and no itching.  Neurological: no loss of consciousness, no seizure, no headache.  Behavioral/Psych: no depression or suicidal ideation.  Endocrine: no hot flashes.  Immunologic: negative.  ----------------------------------------------------------------------------------------------------------------------      Objective       Current [facility] Meds:    cefTRIAXone 2 g Intravenous Q24H   heparin (porcine) 5,000 Units Subcutaneous Q8H   insulin aspart 0-9 Units Subcutaneous TID AC   lactobacillus acidophilus 1 capsule Oral Daily   pantoprazole 40 mg Oral Q AM   sodium chloride 10 mL Intravenous Q12H   vancomycin 1,500 mg Intravenous Q12H        ----------------------------------------------------------------------------------------------------------------------    Vital Signs:  Temp:  [97.5 °F (36.4 °C)-98.5 °F (36.9 °C)] 97.7 °F (36.5 °C)  Heart Rate:  [50-56] 50  Resp:  [18] 18  BP: (118-141)/(56-71)  141/68  No data found.  SpO2 Percentage    06/18/20 1510 06/18/20 1944 06/19/20 0634   SpO2: 98% 97% 98%     SpO2:  [97 %-98 %] 98 %  on   ;   Device (Oxygen Therapy): room air    Body mass index is 24.27 kg/m².  Wt Readings from Last 3 Encounters:   06/16/20 76.7 kg (169 lb 2 oz)   06/05/20 81.6 kg (180 lb)   11/16/19 84.4 kg (186 lb)        Intake/Output Summary (Last 24 hours) at 6/19/2020 0937  Last data filed at 6/19/2020 0400  Gross per 24 hour   Intake 2020.06 ml   Output --   Net 2020.06 ml     Diet Regular; Consistent Carbohydrate  ----------------------------------------------------------------------------------------------------------------------      Physical Exam:    Constitutional:  Well-developed and well-nourished.  No respiratory distress.      HENT:  Head: Normocephalic and atraumatic.  Mouth:  Moist mucous membranes.    Eyes:  Conjunctivae and EOM are normal.  No scleral icterus.  Neck:  Neck supple.  No JVD present.    Cardiovascular:  Normal rate, regular rhythm and normal heart sounds with no murmur. No edema.  Pulmonary/Chest:  No respiratory distress, no wheezes, no crackles, with normal breath sounds and good air movement.  Abdominal:  Soft.  Bowel sounds are normal.  No distension and no tenderness.   Musculoskeletal: Right foot in surgical dressing  Neurological:  Alert and oriented to person, place, and time.  No facial droop.  No slurred speech.   Skin:  Skin is warm and dry.  No rash noted.  No pallor.   Psychiatric:  Normal mood and affect.  Behavior is normal.       ----------------------------------------------------------------------------------------------------------------------            Results from last 7 days   Lab Units 06/19/20  0538 06/18/20  0044 06/17/20  0427 06/16/20  1334 06/16/20  0934   CRP mg/dL 0.96*  --   --   --  1.07*   LACTATE mmol/L  --   --   --  1.7  --    WBC 10*3/mm3 4.65 5.63 6.66  --  5.11   HEMOGLOBIN g/dL 11.8* 11.7* 11.6*  --  13.4   HEMATOCRIT %  36.9* 36.7* 37.0*  --  41.6   MCV fL 91.8 92.4 93.4  --  92.0   MCHC g/dL 32.0 31.9 31.4*  --  32.2   PLATELETS 10*3/mm3 407 419 416  --  496*     Results from last 7 days   Lab Units 06/19/20  0538 06/18/20  0044 06/17/20  0427 06/16/20  1334   SODIUM mmol/L 138 137 136 137   POTASSIUM mmol/L 4.0 3.9 4.2 4.7   MAGNESIUM mg/dL 2.1 2.2 2.1  --    CHLORIDE mmol/L 106 106 106 104   CO2 mmol/L 23.3 22.2 21.9* 24.2   BUN mg/dL 12 13 15 19   CREATININE mg/dL 0.70* 0.72* 0.81 0.93   EGFR IF NONAFRICN AM mL/min/1.73 115 111 97 83   CALCIUM mg/dL 8.7 8.6 8.6 8.9   GLUCOSE mg/dL 141* 115* 125* 178*   ALBUMIN g/dL  --   --   --  3.07*   BILIRUBIN mg/dL  --   --   --  0.2   ALK PHOS U/L  --   --   --  77   AST (SGOT) U/L  --   --   --  16   ALT (SGPT) U/L  --   --   --  12   Estimated Creatinine Clearance: 121.7 mL/min (A) (by C-G formula based on SCr of 0.7 mg/dL (L)).  No results found for: AMMONIA    Glucose   Date/Time Value Ref Range Status   06/19/2020 0635 128 70 - 130 mg/dL Final   06/18/2020 2320 121 70 - 130 mg/dL Final   06/18/2020 1643 172 (H) 70 - 130 mg/dL Final   06/18/2020 1041 148 (H) 70 - 130 mg/dL Final   06/18/2020 0658 144 (H) 70 - 130 mg/dL Final   06/17/2020 2153 142 (H) 70 - 130 mg/dL Final   06/17/2020 1710 133 (H) 70 - 130 mg/dL Final   06/17/2020 1112 123 70 - 130 mg/dL Final     Lab Results   Component Value Date    HGBA1C 9.80 (H) 06/05/2020     Lab Results   Component Value Date    TSH 2.960 06/16/2020    FREET4 1.15 06/16/2020       Blood Culture   Date Value Ref Range Status   06/16/2020 No growth at 2 days  Preliminary   06/16/2020 No growth at 2 days  Preliminary     No results found for: URINECX  Wound Culture   Date Value Ref Range Status   06/16/2020 No growth at 3 days  Final   06/16/2020 Rare Normal Skin Winter  Final     No results found for: STOOLCX  No results found for: RESPCX  Pain Management Panel     There is no flowsheet data to display.             ----------------------------------------------------------------------------------------------------------------------  Imaging Results (Last 24 Hours)     ** No results found for the last 24 hours. **          ----------------------------------------------------------------------------------------------------------------------    Assessment/Plan       Assessment/Plan     ASSESSMENT:    1. Osteomyelitis of the right foot     PLAN:     Comfortable this morning sitting at the edge of his bed.  CRP improved almost normal at 0.96 with normal white count. Wound reported to be healing well.    Sp incision and drainage on 6/16/2020 and feel he would benefit from admission for IV antibiotic therapy and a bone scan to rule out underlying osteomyelitis.  Wound culture from 6/5/2020 showing growth of group B strep with intraoperative cultures from 6/16/2020 in progress.  CRP low at 1.07 with normal white count.  MRI of the right foot does not suggest osteomyelitis at this time as well as no soft tissue abscess.     Case discussed with Dr. Sutton and Dr. Gerard.  As there is concern for osteomyelitis with recurrent infection would recommend to treat as such for a prolonged course of 4 weeks.  Based on previous cultures would change antibiotic therapy to high-dose Rocephin 2 g IV every 24 hours and Daptomycin per pharmacy dosing for easier outpatient administration.       Code Status:   Code Status and Medical Interventions:   Ordered at: 06/16/20 0948     Code Status:    CPR     Medical Interventions (Level of Support Prior to Arrest):    Full       Maricruz Alston PA-C  06/19/20  09:37      Electronically signed by Maricruz Alston PA-C at 06/19/20 [id number]

## 2020-06-19 NOTE — PROGRESS NOTES
PROGRESS NOTE         Patient Identification:  Name:  Davian Newell  Age:  60 y.o.  Sex:  male  :  1960  MRN:  [id number]  Visit Number:  [id number]  Primary Care Provider:  Provider, No Known         LOS: 3 days       ----------------------------------------------------------------------------------------------------------------------  Subjective       Chief Complaints:    No chief complaint on file.        Interval History:      Comfortable this morning sitting at the edge of his bed.  CRP improved almost normal at 0.96 with normal white count. Wound reported to be healing well.    Review of Systems:    Constitutional: no fever, chills and night sweats. No appetite change or unexpected weight change. No fatigue.  Eyes: no eye drainage, itching or redness.  HEENT: no mouth sores, dysphagia or nose bleed.  Respiratory: no for shortness of breath, cough or production of sputum.  Cardiovascular: no chest pain, no palpitations, no orthopnea.  Gastrointestinal: no nausea, vomiting or diarrhea. No abdominal pain, hematemesis or rectal bleeding.  Genitourinary: no dysuria or polyuria.  Hematologic/lymphatic: no lymph node abnormalities, no easy bruising or easy bleeding.  Musculoskeletal: no muscle or joint pain.  Skin: No rash and no itching.  Neurological: no loss of consciousness, no seizure, no headache.  Behavioral/Psych: no depression or suicidal ideation.  Endocrine: no hot flashes.  Immunologic: negative.  ----------------------------------------------------------------------------------------------------------------------      Objective       Current Hospital Meds:    cefTRIAXone 2 g Intravenous Q24H   heparin (porcine) 5,000 Units Subcutaneous Q8H   insulin aspart 0-9 Units Subcutaneous TID AC   lactobacillus acidophilus 1 capsule Oral Daily   pantoprazole 40 mg Oral Q AM   sodium chloride 10 mL Intravenous Q12H   vancomycin 1,500 mg Intravenous Q12H           ----------------------------------------------------------------------------------------------------------------------    Vital Signs:  Temp:  [97.5 °F (36.4 °C)-98.5 °F (36.9 °C)] 97.7 °F (36.5 °C)  Heart Rate:  [50-56] 50  Resp:  [18] 18  BP: (118-141)/(56-71) 141/68  No data found.  SpO2 Percentage    06/18/20 1510 06/18/20 1944 06/19/20 0634   SpO2: 98% 97% 98%     SpO2:  [97 %-98 %] 98 %  on   ;   Device (Oxygen Therapy): room air    Body mass index is 24.27 kg/m².  Wt Readings from Last 3 Encounters:   06/16/20 76.7 kg (169 lb 2 oz)   06/05/20 81.6 kg (180 lb)   11/16/19 84.4 kg (186 lb)        Intake/Output Summary (Last 24 hours) at 6/19/2020 0937  Last data filed at 6/19/2020 0400  Gross per 24 hour   Intake 2020.06 ml   Output --   Net 2020.06 ml     Diet Regular; Consistent Carbohydrate  ----------------------------------------------------------------------------------------------------------------------      Physical Exam:    Constitutional:  Well-developed and well-nourished.  No respiratory distress.      HENT:  Head: Normocephalic and atraumatic.  Mouth:  Moist mucous membranes.    Eyes:  Conjunctivae and EOM are normal.  No scleral icterus.  Neck:  Neck supple.  No JVD present.    Cardiovascular:  Normal rate, regular rhythm and normal heart sounds with no murmur. No edema.  Pulmonary/Chest:  No respiratory distress, no wheezes, no crackles, with normal breath sounds and good air movement.  Abdominal:  Soft.  Bowel sounds are normal.  No distension and no tenderness.   Musculoskeletal: Right foot in surgical dressing  Neurological:  Alert and oriented to person, place, and time.  No facial droop.  No slurred speech.   Skin:  Skin is warm and dry.  No rash noted.  No pallor.   Psychiatric:  Normal mood and affect.  Behavior is normal.       ----------------------------------------------------------------------------------------------------------------------            Results from last 7 days   Lab  Units 06/19/20  0538 06/18/20  0044 06/17/20  0427 06/16/20  1334 06/16/20  0934   CRP mg/dL 0.96*  --   --   --  1.07*   LACTATE mmol/L  --   --   --  1.7  --    WBC 10*3/mm3 4.65 5.63 6.66  --  5.11   HEMOGLOBIN g/dL 11.8* 11.7* 11.6*  --  13.4   HEMATOCRIT % 36.9* 36.7* 37.0*  --  41.6   MCV fL 91.8 92.4 93.4  --  92.0   MCHC g/dL 32.0 31.9 31.4*  --  32.2   PLATELETS 10*3/mm3 407 419 416  --  496*     Results from last 7 days   Lab Units 06/19/20 0538 06/18/20 0044 06/17/20  0427 06/16/20  1334   SODIUM mmol/L 138 137 136 137   POTASSIUM mmol/L 4.0 3.9 4.2 4.7   MAGNESIUM mg/dL 2.1 2.2 2.1  --    CHLORIDE mmol/L 106 106 106 104   CO2 mmol/L 23.3 22.2 21.9* 24.2   BUN mg/dL 12 13 15 19   CREATININE mg/dL 0.70* 0.72* 0.81 0.93   EGFR IF NONAFRICN AM mL/min/1.73 115 111 97 83   CALCIUM mg/dL 8.7 8.6 8.6 8.9   GLUCOSE mg/dL 141* 115* 125* 178*   ALBUMIN g/dL  --   --   --  3.07*   BILIRUBIN mg/dL  --   --   --  0.2   ALK PHOS U/L  --   --   --  77   AST (SGOT) U/L  --   --   --  16   ALT (SGPT) U/L  --   --   --  12   Estimated Creatinine Clearance: 121.7 mL/min (A) (by C-G formula based on SCr of 0.7 mg/dL (L)).  No results found for: AMMONIA    Glucose   Date/Time Value Ref Range Status   06/19/2020 0635 128 70 - 130 mg/dL Final   06/18/2020 2320 121 70 - 130 mg/dL Final   06/18/2020 1643 172 (H) 70 - 130 mg/dL Final   06/18/2020 1041 148 (H) 70 - 130 mg/dL Final   06/18/2020 0658 144 (H) 70 - 130 mg/dL Final   06/17/2020 2153 142 (H) 70 - 130 mg/dL Final   06/17/2020 1710 133 (H) 70 - 130 mg/dL Final   06/17/2020 1112 123 70 - 130 mg/dL Final     Lab Results   Component Value Date    HGBA1C 9.80 (H) 06/05/2020     Lab Results   Component Value Date    TSH 2.960 06/16/2020    FREET4 1.15 06/16/2020       Blood Culture   Date Value Ref Range Status   06/16/2020 No growth at 2 days  Preliminary   06/16/2020 No growth at 2 days  Preliminary     No results found for: URINECX  Wound Culture   Date Value Ref Range  Status   06/16/2020 No growth at 3 days  Final   06/16/2020 Rare Normal Skin Winter  Final     No results found for: STOOLCX  No results found for: RESPCX  Pain Management Panel     There is no flowsheet data to display.            ----------------------------------------------------------------------------------------------------------------------  Imaging Results (Last 24 Hours)     ** No results found for the last 24 hours. **          ----------------------------------------------------------------------------------------------------------------------    Assessment/Plan       Assessment/Plan     ASSESSMENT:    1. Osteomyelitis of the right foot     PLAN:     Comfortable this morning sitting at the edge of his bed.  CRP improved almost normal at 0.96 with normal white count. Wound reported to be healing well.    Sp incision and drainage on 6/16/2020 and feel he would benefit from admission for IV antibiotic therapy and a bone scan to rule out underlying osteomyelitis.  Wound culture from 6/5/2020 showing growth of group B strep with intraoperative cultures from 6/16/2020 in progress.  CRP low at 1.07 with normal white count.  MRI of the right foot does not suggest osteomyelitis at this time as well as no soft tissue abscess.     Case discussed with Dr. Sutton and Dr. Gerard.  As there is concern for osteomyelitis with recurrent infection would recommend to treat as such for a prolonged course of 4 weeks.  Based on previous cultures would change antibiotic therapy to high-dose Rocephin 2 g IV every 24 hours and Daptomycin per pharmacy dosing for easier outpatient administration.       Code Status:   Code Status and Medical Interventions:   Ordered at: 06/16/20 0948     Code Status:    CPR     Medical Interventions (Level of Support Prior to Arrest):    Full       Maricruz Alston PA-C  06/19/20  09:37

## 2020-06-19 NOTE — THERAPY TREATMENT NOTE
Acute Care - Physical Therapy Treatment Note  [facility]     Patient Name: Davian Newell  : 1960  MRN: [id number]  Today's Date: 2020  Onset of Illness/Injury or Date of Surgery: 20  Date of Referral to PT: 20  Referring Physician: Dr. Sutton    Admit Date: 2020    Visit Dx:    ICD-10-CM ICD-9-CM   1. Abscess of right foot L02.611 682.7     Patient Active Problem List   Diagnosis   • Cellulitis of foot, right   • Hyperglycemia   • Abscess of right foot   • T2DM (type 2 diabetes mellitus) (CMS/HCC)   • GERD (gastroesophageal reflux disease)   • Hypoalbuminemia       Therapy Treatment    Rehabilitation Treatment Summary     Row Name 20 1000             Treatment Time/Intention    Discipline  physical therapist  -BC      Document Type  evaluation  -BC      Mode of Treatment  physical therapy  -BC      Patient Effort  good  -BC      Recorded by [BC] Beckie Freeman, PT 20 1033      Row Name 20 1000             Cognitive Assessment/Intervention- PT/OT    Orientation Status (Cognition)  oriented x 4  -BC      Follows Commands (Cognition)  WFL  -BC      Recorded by [BC] Beckie Freeman, PT 20 1033      Row Name 20 1000             Mobility Assessment/Intervention    Extremity Weight-bearing Status  left lower extremity;right lower extremity  -BC      Left Lower Extremity (Weight-bearing Status)  full weight-bearing (FWB)  -BC      Right Lower Extremity (Weight-bearing Status)  non weight-bearing (NWB)  -BC      Recorded by [BC] Beckie Freeman, PT 20 1033      Row Name 20 1000             Bed Mobility Assessment/Treatment    Bed Mobility Assessment/Treatment  bed mobility (all) activities  -BC      [place] Level (Bed Mobility)  contact guard assist  -BC      Assistive Device (Bed Mobility)  bed rails  -BC      Recorded by [BC] Beckie Freeman, PT 20 1033      Row Name 20 1000             Transfer Assessment/Treatment    Transfer  Assessment/Treatment  sit-stand transfer;stand-sit transfer  -BC      Maintains Weight-bearing Status (Transfers)  able to maintain  -BC      Recorded by [BC] Beckie Freeman, PT 06/19/20 1033      Row Name 06/19/20 1000             Sit-Stand Transfer    Sit-Stand [place] (Transfers)  contact guard  -BC      Assistive Device (Sit-Stand Transfers)  walker, front-wheeled  -BC      Recorded by [BC] Beckie Freeman, PT 06/19/20 1033      Row Name 06/19/20 1000             Stand-Sit Transfer    Stand-Sit [place] (Transfers)  contact guard  -BC      Assistive Device (Stand-Sit Transfers)  walker, front-wheeled  -BC      Recorded by [BC] Beckie Freeman, PT 06/19/20 1033      Row Name 06/19/20 1000             Gait/Stairs Assessment/Training    Gait/Stairs Assessment/Training  gait/ambulation independence  -BC      [place] Level (Gait)  conditional independence  -BC      Assistive Device (Gait)  walker, front-wheeled  -BC      Distance in Feet (Gait)  100 ft  -BC      Recorded by [BC] Beckie Freeman, PT 06/19/20 1033      Row Name 06/19/20 1000             Positioning and Restraints    Pre-Treatment Position  in bed  -BC      Post Treatment Position  bed  -BC      In Bed  sitting EOB  -BC      Recorded by [BC] Beckie Freeman, PT 06/19/20 1033      Row Name                Wound 06/16/20 1130 Right foot Incision    Wound - Properties Group Date first assessed: 06/16/20 [CE] Time first assessed: 1130 [CE] Side: Right [CE] Location: foot [CE] Primary Wound Type: Incision [CE] Recorded by:  [CE] Delonte Way, RN 06/16/20 1138    Row Name 06/19/20 1000             Coping    Observed Emotional State  calm;cooperative  -BC      Verbalized Emotional State  acceptance  -BC      Recorded by [BC] Beckie Freeman, PT 06/19/20 1033      Row Name 06/19/20 1000             Plan of Care Review    Plan of Care Reviewed With  patient  -BC      Recorded by [BC] Beckie Freeman, PT 06/19/20 1033        User  Key  (r) = Recorded By, (t) = Taken By, (c) = Cosigned By    Initials Name Effective Dates Discipline    BC Beckie Freeman PT 03/14/16 -  PT    Delonte Rutledge, RN 06/16/16 -  Nurse          Wound 06/16/20 1130 Right foot Incision (Active)   Dressing Appearance dry;intact 6/19/2020  9:25 AM   Base dressing in place, unable to visualize 6/19/2020  9:25 AM   Dressing Care, Wound elastic bandage 6/18/2020  8:00 PM           Physical Therapy Education                 Title: PT OT SLP Therapies (Done)     Topic: Physical Therapy (Done)     Point: Mobility training (Done)     Description:   Instruct learner(s) on safety and technique for assisting patient out of bed, chair or wheelchair.  Instruct in the proper use of assistive devices, such as walker, crutches, cane or brace.              Patient Friendly Description:   It's important to get you on your feet again, but we need to do so in a way that is safe for you. Falling has serious consequences, and your personal safety is the most important thing of all.        When it's time to get out of bed, one of us or a family member will sit next to you on the bed to give you support.     If your doctor or nurse tells you to use a walker, crutches, a cane, or a brace, be sure you use it every time you get out of bed, even if you think you don't need it.    Learning Progress Summary           Patient Acceptance, E, VU by BC at 6/19/2020 1033    Acceptance, E,TB, VU by AD at 6/19/2020 0414    Acceptance, E,TB, VU by AD at 6/19/2020 0413    Acceptance, E, VU by BC at 6/18/2020 1040                   Point: Home exercise program (Done)     Description:   Instruct learner(s) on appropriate technique for monitoring, assisting and/or progressing patient with therapeutic exercises and activities.              Learning Progress Summary           Patient Acceptance, E, VU by BC at 6/19/2020 1033    Acceptance, E,TB, VU by AD at 6/19/2020 0414    Acceptance, E,TB, VU by AD at  6/19/2020 0413    Acceptance, E, VU by BC at 6/18/2020 1040                   Point: Body mechanics (Done)     Description:   Instruct learner(s) on proper positioning and spine alignment for patient and/or caregiver during mobility tasks and/or exercises.              Learning Progress Summary           Patient Acceptance, E, VU by BC at 6/19/2020 1033    Acceptance, E,TB, VU by AD at 6/19/2020 0414    Acceptance, E,TB, VU by AD at 6/19/2020 0413    Acceptance, E, VU by BC at 6/18/2020 1040                   Point: Precautions (Done)     Description:   Instruct learner(s) on prescribed precautions during mobility and gait tasks              Learning Progress Summary           Patient Acceptance, E, VU by BC at 6/19/2020 1033    Acceptance, E,TB, VU by AD at 6/19/2020 0414    Acceptance, E,TB, VU by AD at 6/19/2020 0413    Acceptance, E, VU by BC at 6/18/2020 1040                               User Key     Initials Effective Dates Name Provider Type Discipline     06/16/16 -  Kimberlee Pollock RN Registered Nurse Nurse    BC 03/14/16 -  Beckie Freeman PT Physical Therapist PT                PT Recommendation and Plan     Plan of Care Reviewed With: patient     Time Calculation:   PT Charges     Row Name 06/19/20 1034             Time Calculation    PT Received On  06/19/20  -BC         Time Calculation- PT    Total Timed Code Minutes- PT  30 minute(s)  -BC         Timed Charges    51719 - Gait Training Minutes   15  -BC      99201 - PT Therapeutic Activity Minutes  15  -BC        User Key  (r) = Recorded By, (t) = Taken By, (c) = Cosigned By    Initials Name Provider Type    BC Beckie Freeman PT Physical Therapist        Therapy Charges for Today     Code Description Service Date Service Provider Modifiers Qty    04341968419 HC GAIT TRAINING EA 15 MIN 6/18/2020 Beckie Freeman, PT GP 1    39650035372 HC PT EVAL MOD COMPLEXITY 3 6/18/2020 Beckie Freeman PT GP 1    01048755697 HC GAIT TRAINING EA 15  MIN 6/19/2020 Beckie Freeman, PT GP 1    [id number]  PT THERAPEUTIC ACT EA 15 MIN 6/19/2020 Beckie Freeman, PT GP 1               Beckie Freeman, PT  6/19/2020

## 2020-06-20 ENCOUNTER — READMISSION MANAGEMENT (OUTPATIENT)
Dept: CALL CENTER | Facility: HOSPITAL | Age: 60
End: 2020-06-20

## 2020-06-20 NOTE — PAYOR COMM NOTE
"[facility]  AKOSUA JACOBSON  PHONE  [phone]  FAX  [phone]  NPI:  [id number]    PATIENT D/C 6/19/2020      Davian Zazueta (60 y.o. Male)     Date of Birth Social Security Number Address Home Phone MRN    1960  [address] [id number] [phone] [id number]    [employer] Marital Status          [facility] Single       Admission Date Admission Type Admitting Provider Attending Provider Department, Room/Bed    6/16/20 Elective Daisy Gerard, [place]  [address], 3343/2S    Discharge Date Discharge Disposition Discharge Destination        6/19/2020 Home or Self Care              Attending Provider:  (none)   Allergies:  No Known Allergies    Isolation:  None   Infection:  None   Code Status:  Prior    Ht:  177.8 cm (70\")   Wt:  76.7 kg (169 lb 2 oz)    Admission Cmt:  None   Principal Problem:  Abscess of right foot [L02.611] More...                 Active Insurance as of 6/16/2020     Primary Coverage     Payor Plan Insurance Group Employer/Plan Group    CIGNA MISC CIGNA [id number]     Coverage Address Coverage Phone Number Coverage Fax Number Effective Dates    [address] [phone]  1/1/2018 - None Entered    [facility] [id number]       Subscriber Name Subscriber Birth Date Member ID       DAVIAN ZAZUETA 1960 [id number]                 Emergency Contacts      (Rel.) Home Phone Work Phone Mobile Phone    JUAN ALBERTOCHICHI GRIS (Sister) [phone] -- --              "

## 2020-06-20 NOTE — OUTREACH NOTE
Prep Survey      Responses   [facility] discharged from?  [facility]   Is LACE score < 7 ?  No   Eligibility  Readm Mgmt   Discharge diagnosis  Right foot abscess. s/p I&D, asymptomatic bradycardia, NIDDM II, GERD   COVID-19 Test Status  Negative   Does the patient have one of the following disease processes/diagnoses(primary or secondary)?  General Surgery   Does the patient have Home health ordered?  Yes   What is the Home health agency?   [facility] for IV ABo[facility]   Is there a DME ordered?  Yes   What DME was ordered?  [facility] for    Comments regarding appointments  see AVS   Medication alerts for this patient  see AVS   Prep survey completed?  Yes          Korina Escobar RN

## 2020-06-21 LAB
BACTERIA SPEC AEROBE CULT: NORMAL
BACTERIA SPEC AEROBE CULT: NORMAL

## 2020-06-23 ENCOUNTER — READMISSION MANAGEMENT (OUTPATIENT)
Dept: CALL CENTER | Facility: HOSPITAL | Age: 60
End: 2020-06-23

## 2020-06-23 NOTE — OUTREACH NOTE
General Surgery Week 1 Survey      Responses   [facility] patient discharged from?  [facility]   Does the patient have one of the following disease processes/diagnoses(primary or secondary)?  General Surgery   Is there a successful TCM telephone encounter documented?  No   Week 1 attempt successful?  No   Unsuccessful attempts  Attempt 1          Gracie Brothers RN

## 2020-06-25 ENCOUNTER — LAB (OUTPATIENT)
Dept: LAB | Facility: HOSPITAL | Age: 60
End: 2020-06-25

## 2020-06-25 ENCOUNTER — TRANSCRIBE ORDERS (OUTPATIENT)
Dept: ADMINISTRATIVE | Facility: HOSPITAL | Age: 60
End: 2020-06-25

## 2020-06-25 ENCOUNTER — READMISSION MANAGEMENT (OUTPATIENT)
Dept: CALL CENTER | Facility: HOSPITAL | Age: 60
End: 2020-06-25

## 2020-06-25 DIAGNOSIS — M86.179 OTHER ACUTE OSTEOMYELITIS, UNSPECIFIED ANKLE AND FOOT (HCC): ICD-10-CM

## 2020-06-25 DIAGNOSIS — L02.611 CUTANEOUS ABSCESS OF RIGHT FOOT: Primary | ICD-10-CM

## 2020-06-25 DIAGNOSIS — L02.611 CUTANEOUS ABSCESS OF RIGHT FOOT: ICD-10-CM

## 2020-06-25 LAB
ALBUMIN SERPL-MCNC: 3.71 G/DL (ref 3.5–5.2)
ALBUMIN/GLOB SERPL: 0.9 G/DL
ALP SERPL-CCNC: 82 U/L (ref 39–117)
ALT SERPL W P-5'-P-CCNC: 27 U/L (ref 1–41)
ANION GAP SERPL CALCULATED.3IONS-SCNC: 10.1 MMOL/L (ref 5–15)
AST SERPL-CCNC: 20 U/L (ref 1–40)
BASOPHILS # BLD AUTO: 0.05 10*3/MM3 (ref 0–0.2)
BASOPHILS NFR BLD AUTO: 1 % (ref 0–1.5)
BILIRUB SERPL-MCNC: 0.3 MG/DL (ref 0.2–1.2)
BUN BLD-MCNC: 15 MG/DL (ref 8–23)
BUN/CREAT SERPL: 20.3 (ref 7–25)
CALCIUM SPEC-SCNC: 9.4 MG/DL (ref 8.6–10.5)
CHLORIDE SERPL-SCNC: 103 MMOL/L (ref 98–107)
CK SERPL-CCNC: 63 U/L (ref 20–200)
CO2 SERPL-SCNC: 23.9 MMOL/L (ref 22–29)
CREAT BLD-MCNC: 0.74 MG/DL (ref 0.76–1.27)
CRP SERPL-MCNC: 0.27 MG/DL (ref 0–0.5)
DEPRECATED RDW RBC AUTO: 42.6 FL (ref 37–54)
EOSINOPHIL # BLD AUTO: 0.14 10*3/MM3 (ref 0–0.4)
EOSINOPHIL NFR BLD AUTO: 2.9 % (ref 0.3–6.2)
ERYTHROCYTE [DISTWIDTH] IN BLOOD BY AUTOMATED COUNT: 12.8 % (ref 12.3–15.4)
ERYTHROCYTE [SEDIMENTATION RATE] IN BLOOD: 41 MM/HR (ref 0–20)
GFR SERPL CREATININE-BSD FRML MDRD: 108 ML/MIN/1.73
GLOBULIN UR ELPH-MCNC: 4.2 GM/DL
GLUCOSE BLD-MCNC: 163 MG/DL (ref 65–99)
HCT VFR BLD AUTO: 39.5 % (ref 37.5–51)
HGB BLD-MCNC: 12.8 G/DL (ref 13–17.7)
IMM GRANULOCYTES # BLD AUTO: 0.02 10*3/MM3 (ref 0–0.05)
IMM GRANULOCYTES NFR BLD AUTO: 0.4 % (ref 0–0.5)
LYMPHOCYTES # BLD AUTO: 1.48 10*3/MM3 (ref 0.7–3.1)
LYMPHOCYTES NFR BLD AUTO: 30.1 % (ref 19.6–45.3)
MCH RBC QN AUTO: 29.8 PG (ref 26.6–33)
MCHC RBC AUTO-ENTMCNC: 32.4 G/DL (ref 31.5–35.7)
MCV RBC AUTO: 92.1 FL (ref 79–97)
MONOCYTES # BLD AUTO: 0.59 10*3/MM3 (ref 0.1–0.9)
MONOCYTES NFR BLD AUTO: 12 % (ref 5–12)
NEUTROPHILS # BLD AUTO: 2.63 10*3/MM3 (ref 1.7–7)
NEUTROPHILS NFR BLD AUTO: 53.6 % (ref 42.7–76)
NRBC BLD AUTO-RTO: 0 /100 WBC (ref 0–0.2)
PLATELET # BLD AUTO: 411 10*3/MM3 (ref 140–450)
PMV BLD AUTO: 9.5 FL (ref 6–12)
POTASSIUM BLD-SCNC: 5.1 MMOL/L (ref 3.5–5.2)
PROT SERPL-MCNC: 7.9 G/DL (ref 6–8.5)
RBC # BLD AUTO: 4.29 10*6/MM3 (ref 4.14–5.8)
SODIUM BLD-SCNC: 137 MMOL/L (ref 136–145)
WBC NRBC COR # BLD: 4.91 10*3/MM3 (ref 3.4–10.8)

## 2020-06-25 PROCEDURE — 36415 COLL VENOUS BLD VENIPUNCTURE: CPT

## 2020-06-25 PROCEDURE — 86140 C-REACTIVE PROTEIN: CPT

## 2020-06-25 PROCEDURE — 85025 COMPLETE CBC W/AUTO DIFF WBC: CPT

## 2020-06-25 PROCEDURE — 80053 COMPREHEN METABOLIC PANEL: CPT

## 2020-06-25 PROCEDURE — 82550 ASSAY OF CK (CPK): CPT

## 2020-06-25 PROCEDURE — 85652 RBC SED RATE AUTOMATED: CPT

## 2020-06-25 NOTE — OUTREACH NOTE
General Surgery Week 1 Survey      Responses   [facility] patient discharged from?  [facility]   Does the patient have one of the following disease processes/diagnoses(primary or secondary)?  General Surgery   Is there a successful TCM telephone encounter documented?  No   Week 1 attempt successful?  Yes   Call start time  1552   Call end time  1554   Discharge diagnosis  Right foot abscess. s/p I&D, asymptomatic bradycardia, NIDDM II, GERD   Meds reviewed with patient/caregiver?  Yes   Is the patient having any side effects they believe may be caused by any medication additions or changes?  No   Does the patient have all medications related to this admission filled (includes all antibiotics, pain medications, etc.)  Yes   Is the patient taking all medications as directed (includes completed medication regime)?  Yes   Does the patient have a follow up appointment scheduled with their surgeon?  Yes   Has the patient kept scheduled appointments due by today?  N/A   What is the Home health agency?   [facility]   Has home health visited the patient within 72 hours of discharge?  Yes   What DME was ordered?  [facility] for [facility]   Psychosocial issues?  No   Did the patient receive a copy of their discharge instructions?  Yes   Nursing interventions  Reviewed instructions with patient   What is the patient's perception of their health status since discharge?  Improving   Nursing interventions  Nurse provided patient education   Is the patient /caregiver able to teach back basic post-op care?  Continue use of incentive spirometry at least 1 week post discharge, Practice 'cough and deep breath', Lifting as instructed by MD in discharge instructions, Do not remove steri-strips, Keep incision areas clean,dry and protected   Is the patient/caregiver able to teach back signs and symptoms of incisional infection?  Fever, Pus or odor from incision, Incisional warmth, Increased  redness, swelling or pain at the incisonal site, Increased drainage or bleeding   Is the patient/caregiver able to teach back steps to recovery at home?  Make a list of questions for surgeon's appointment, Eat a well-balance diet, Set small, achievable goals for return to baseline health, Rest and rebuild strength, gradually increase activity   Is the patient/caregiver able to teach back the hierarchy of who to call/visit for symptoms/problems? PCP, Specialist, Home health nurse, Urgent Care, ED, 911  Yes   Additional teach back comments  Says his foot is red, but no issues to report.  Afebrile.  Infusions continue.   Week 1 call completed?  Yes   Wrap up additional comments  Appt with Surgeon next week.          Blanche Marshall RN

## 2020-06-29 ENCOUNTER — TRANSCRIBE ORDERS (OUTPATIENT)
Dept: ADMINISTRATIVE | Facility: HOSPITAL | Age: 60
End: 2020-06-29

## 2020-06-29 ENCOUNTER — LAB (OUTPATIENT)
Dept: LAB | Facility: HOSPITAL | Age: 60
End: 2020-06-29

## 2020-06-29 DIAGNOSIS — M86.171 OTHER ACUTE OSTEOMYELITIS OF RIGHT FOOT (HCC): ICD-10-CM

## 2020-06-29 DIAGNOSIS — L02.611 CUTANEOUS ABSCESS OF RIGHT FOOT: Primary | ICD-10-CM

## 2020-06-29 DIAGNOSIS — L02.611 CUTANEOUS ABSCESS OF RIGHT FOOT: ICD-10-CM

## 2020-06-29 LAB
ALBUMIN SERPL-MCNC: 3.8 G/DL (ref 3.5–5.2)
ALBUMIN/GLOB SERPL: 1 G/DL
ALP SERPL-CCNC: 101 U/L (ref 39–117)
ALT SERPL W P-5'-P-CCNC: 21 U/L (ref 1–41)
ANION GAP SERPL CALCULATED.3IONS-SCNC: 10.2 MMOL/L (ref 5–15)
AST SERPL-CCNC: 21 U/L (ref 1–40)
BASOPHILS # BLD AUTO: 0.05 10*3/MM3 (ref 0–0.2)
BASOPHILS NFR BLD AUTO: 1 % (ref 0–1.5)
BILIRUB SERPL-MCNC: 0.2 MG/DL (ref 0.2–1.2)
BUN SERPL-MCNC: 22 MG/DL (ref 8–23)
BUN/CREAT SERPL: 25.3 (ref 7–25)
CALCIUM SPEC-SCNC: 9 MG/DL (ref 8.6–10.5)
CHLORIDE SERPL-SCNC: 103 MMOL/L (ref 98–107)
CK SERPL-CCNC: 55 U/L (ref 20–200)
CO2 SERPL-SCNC: 21.8 MMOL/L (ref 22–29)
CREAT SERPL-MCNC: 0.87 MG/DL (ref 0.76–1.27)
CRP SERPL-MCNC: 0.36 MG/DL (ref 0–0.5)
DEPRECATED RDW RBC AUTO: 39.7 FL (ref 37–54)
EOSINOPHIL # BLD AUTO: 0.23 10*3/MM3 (ref 0–0.4)
EOSINOPHIL NFR BLD AUTO: 4.6 % (ref 0.3–6.2)
ERYTHROCYTE [DISTWIDTH] IN BLOOD BY AUTOMATED COUNT: 12.4 % (ref 12.3–15.4)
ERYTHROCYTE [SEDIMENTATION RATE] IN BLOOD: 24 MM/HR (ref 0–20)
GFR SERPL CREATININE-BSD FRML MDRD: 90 ML/MIN/1.73
GLOBULIN UR ELPH-MCNC: 3.8 GM/DL
GLUCOSE SERPL-MCNC: 156 MG/DL (ref 65–99)
HCT VFR BLD AUTO: 37.4 % (ref 37.5–51)
HGB BLD-MCNC: 12.6 G/DL (ref 13–17.7)
IMM GRANULOCYTES # BLD AUTO: 0.02 10*3/MM3 (ref 0–0.05)
IMM GRANULOCYTES NFR BLD AUTO: 0.4 % (ref 0–0.5)
LYMPHOCYTES # BLD AUTO: 1.28 10*3/MM3 (ref 0.7–3.1)
LYMPHOCYTES NFR BLD AUTO: 25.7 % (ref 19.6–45.3)
MCH RBC QN AUTO: 30 PG (ref 26.6–33)
MCHC RBC AUTO-ENTMCNC: 33.7 G/DL (ref 31.5–35.7)
MCV RBC AUTO: 89 FL (ref 79–97)
MONOCYTES # BLD AUTO: 0.58 10*3/MM3 (ref 0.1–0.9)
MONOCYTES NFR BLD AUTO: 11.6 % (ref 5–12)
NEUTROPHILS NFR BLD AUTO: 2.83 10*3/MM3 (ref 1.7–7)
NEUTROPHILS NFR BLD AUTO: 56.7 % (ref 42.7–76)
NRBC BLD AUTO-RTO: 0 /100 WBC (ref 0–0.2)
PLATELET # BLD AUTO: 369 10*3/MM3 (ref 140–450)
PMV BLD AUTO: 10.6 FL (ref 6–12)
POTASSIUM SERPL-SCNC: 4.7 MMOL/L (ref 3.5–5.2)
PROT SERPL-MCNC: 7.6 G/DL (ref 6–8.5)
RBC # BLD AUTO: 4.2 10*6/MM3 (ref 4.14–5.8)
SODIUM SERPL-SCNC: 135 MMOL/L (ref 136–145)
WBC # BLD AUTO: 4.99 10*3/MM3 (ref 3.4–10.8)

## 2020-06-29 PROCEDURE — 85025 COMPLETE CBC W/AUTO DIFF WBC: CPT

## 2020-06-29 PROCEDURE — 85652 RBC SED RATE AUTOMATED: CPT

## 2020-06-29 PROCEDURE — 82550 ASSAY OF CK (CPK): CPT

## 2020-06-29 PROCEDURE — 36415 COLL VENOUS BLD VENIPUNCTURE: CPT

## 2020-06-29 PROCEDURE — 86140 C-REACTIVE PROTEIN: CPT

## 2020-06-29 PROCEDURE — 80053 COMPREHEN METABOLIC PANEL: CPT

## 2020-07-01 ENCOUNTER — READMISSION MANAGEMENT (OUTPATIENT)
Dept: CALL CENTER | Facility: HOSPITAL | Age: 60
End: 2020-07-01

## 2020-07-01 NOTE — OUTREACH NOTE
General Surgery Week 2 Survey      Responses   [facility] patient discharged from?  [facility]   Does the patient have one of the following disease processes/diagnoses(primary or secondary)?  General Surgery   Week 2 attempt successful?  No   Unsuccessful attempts  Attempt 1          Lucy Hines RN

## 2020-07-03 ENCOUNTER — READMISSION MANAGEMENT (OUTPATIENT)
Dept: CALL CENTER | Facility: HOSPITAL | Age: 60
End: 2020-07-03

## 2020-07-03 NOTE — OUTREACH NOTE
General Surgery Week 2 Survey      Responses   [facility] patient discharged from?  [facility]   Does the patient have one of the following disease processes/diagnoses(primary or secondary)?  General Surgery   Week 2 attempt successful?  No   Unsuccessful attempts  Attempt 2          Lucie Light RN

## 2020-07-06 ENCOUNTER — TRANSCRIBE ORDERS (OUTPATIENT)
Dept: HOSPITALIST | Facility: HOSPITAL | Age: 60
End: 2020-07-06

## 2020-07-06 ENCOUNTER — LAB (OUTPATIENT)
Dept: LAB | Facility: HOSPITAL | Age: 60
End: 2020-07-06

## 2020-07-06 DIAGNOSIS — L02.611 CUTANEOUS ABSCESS OF RIGHT FOOT: ICD-10-CM

## 2020-07-06 DIAGNOSIS — L02.611 CUTANEOUS ABSCESS OF RIGHT FOOT: Primary | ICD-10-CM

## 2020-07-06 LAB
ALBUMIN SERPL-MCNC: 4.02 G/DL (ref 3.5–5.2)
ALBUMIN/GLOB SERPL: 1.2 G/DL
ALP SERPL-CCNC: 101 U/L (ref 39–117)
ALT SERPL W P-5'-P-CCNC: 23 U/L (ref 1–41)
ANION GAP SERPL CALCULATED.3IONS-SCNC: 10.9 MMOL/L (ref 5–15)
AST SERPL-CCNC: 20 U/L (ref 1–40)
BASOPHILS # BLD AUTO: 0.05 10*3/MM3 (ref 0–0.2)
BASOPHILS NFR BLD AUTO: 1.1 % (ref 0–1.5)
BILIRUB SERPL-MCNC: 0.3 MG/DL (ref 0.2–1.2)
BUN SERPL-MCNC: 20 MG/DL (ref 8–23)
BUN/CREAT SERPL: 21.3 (ref 7–25)
CALCIUM SPEC-SCNC: 9.3 MG/DL (ref 8.6–10.5)
CHLORIDE SERPL-SCNC: 102 MMOL/L (ref 98–107)
CK SERPL-CCNC: 67 U/L (ref 20–200)
CO2 SERPL-SCNC: 24.1 MMOL/L (ref 22–29)
CREAT SERPL-MCNC: 0.94 MG/DL (ref 0.76–1.27)
CRP SERPL-MCNC: 0.35 MG/DL (ref 0–0.5)
DEPRECATED RDW RBC AUTO: 43.9 FL (ref 37–54)
EOSINOPHIL # BLD AUTO: 0.25 10*3/MM3 (ref 0–0.4)
EOSINOPHIL NFR BLD AUTO: 5.4 % (ref 0.3–6.2)
ERYTHROCYTE [DISTWIDTH] IN BLOOD BY AUTOMATED COUNT: 13 % (ref 12.3–15.4)
ERYTHROCYTE [SEDIMENTATION RATE] IN BLOOD: 17 MM/HR (ref 0–20)
GFR SERPL CREATININE-BSD FRML MDRD: 82 ML/MIN/1.73
GLOBULIN UR ELPH-MCNC: 3.5 GM/DL
GLUCOSE SERPL-MCNC: 171 MG/DL (ref 65–99)
HCT VFR BLD AUTO: 40.3 % (ref 37.5–51)
HGB BLD-MCNC: 13.1 G/DL (ref 13–17.7)
IMM GRANULOCYTES # BLD AUTO: 0.02 10*3/MM3 (ref 0–0.05)
IMM GRANULOCYTES NFR BLD AUTO: 0.4 % (ref 0–0.5)
LYMPHOCYTES # BLD AUTO: 1.49 10*3/MM3 (ref 0.7–3.1)
LYMPHOCYTES NFR BLD AUTO: 32 % (ref 19.6–45.3)
MCH RBC QN AUTO: 30 PG (ref 26.6–33)
MCHC RBC AUTO-ENTMCNC: 32.5 G/DL (ref 31.5–35.7)
MCV RBC AUTO: 92.2 FL (ref 79–97)
MONOCYTES # BLD AUTO: 0.54 10*3/MM3 (ref 0.1–0.9)
MONOCYTES NFR BLD AUTO: 11.6 % (ref 5–12)
NEUTROPHILS NFR BLD AUTO: 2.31 10*3/MM3 (ref 1.7–7)
NEUTROPHILS NFR BLD AUTO: 49.5 % (ref 42.7–76)
NRBC BLD AUTO-RTO: 0 /100 WBC (ref 0–0.2)
PLATELET # BLD AUTO: 297 10*3/MM3 (ref 140–450)
PMV BLD AUTO: 9.2 FL (ref 6–12)
POTASSIUM SERPL-SCNC: 4.8 MMOL/L (ref 3.5–5.2)
PROT SERPL-MCNC: 7.5 G/DL (ref 6–8.5)
RBC # BLD AUTO: 4.37 10*6/MM3 (ref 4.14–5.8)
SODIUM SERPL-SCNC: 137 MMOL/L (ref 136–145)
WBC # BLD AUTO: 4.66 10*3/MM3 (ref 3.4–10.8)

## 2020-07-06 PROCEDURE — 85025 COMPLETE CBC W/AUTO DIFF WBC: CPT

## 2020-07-06 PROCEDURE — 85652 RBC SED RATE AUTOMATED: CPT

## 2020-07-06 PROCEDURE — 36415 COLL VENOUS BLD VENIPUNCTURE: CPT

## 2020-07-06 PROCEDURE — 82550 ASSAY OF CK (CPK): CPT

## 2020-07-06 PROCEDURE — 80053 COMPREHEN METABOLIC PANEL: CPT

## 2020-07-06 PROCEDURE — 86140 C-REACTIVE PROTEIN: CPT

## 2020-07-13 ENCOUNTER — LAB (OUTPATIENT)
Dept: LAB | Facility: HOSPITAL | Age: 60
End: 2020-07-13

## 2020-07-13 ENCOUNTER — TRANSCRIBE ORDERS (OUTPATIENT)
Dept: ADMINISTRATIVE | Facility: HOSPITAL | Age: 60
End: 2020-07-13

## 2020-07-13 ENCOUNTER — TRANSCRIBE ORDERS (OUTPATIENT)
Dept: INFUSION THERAPY | Facility: HOSPITAL | Age: 60
End: 2020-07-13

## 2020-07-13 DIAGNOSIS — L02.611 ABSCESS OF RIGHT FOOT: Primary | ICD-10-CM

## 2020-07-13 DIAGNOSIS — L02.611 ABSCESS OF RIGHT FOOT: ICD-10-CM

## 2020-07-13 LAB
ALBUMIN SERPL-MCNC: 3.9 G/DL (ref 3.5–5.2)
ALBUMIN/GLOB SERPL: 1.1 G/DL
ALP SERPL-CCNC: 79 U/L (ref 39–117)
ALT SERPL W P-5'-P-CCNC: 16 U/L (ref 1–41)
ANION GAP SERPL CALCULATED.3IONS-SCNC: 9 MMOL/L (ref 5–15)
AST SERPL-CCNC: 20 U/L (ref 1–40)
BASOPHILS # BLD AUTO: 0.04 10*3/MM3 (ref 0–0.2)
BASOPHILS NFR BLD AUTO: 0.7 % (ref 0–1.5)
BILIRUB SERPL-MCNC: 0.4 MG/DL (ref 0–1.2)
BUN SERPL-MCNC: 18 MG/DL (ref 8–23)
BUN/CREAT SERPL: 20.9 (ref 7–25)
CALCIUM SPEC-SCNC: 9.3 MG/DL (ref 8.6–10.5)
CHLORIDE SERPL-SCNC: 103 MMOL/L (ref 98–107)
CK SERPL-CCNC: 66 U/L (ref 20–200)
CO2 SERPL-SCNC: 24 MMOL/L (ref 22–29)
CREAT SERPL-MCNC: 0.86 MG/DL (ref 0.76–1.27)
CRP SERPL-MCNC: 0.32 MG/DL (ref 0–0.5)
DEPRECATED RDW RBC AUTO: 43.2 FL (ref 37–54)
EOSINOPHIL # BLD AUTO: 0.41 10*3/MM3 (ref 0–0.4)
EOSINOPHIL NFR BLD AUTO: 7.6 % (ref 0.3–6.2)
ERYTHROCYTE [DISTWIDTH] IN BLOOD BY AUTOMATED COUNT: 13.1 % (ref 12.3–15.4)
ERYTHROCYTE [SEDIMENTATION RATE] IN BLOOD: 14 MM/HR (ref 0–20)
GFR SERPL CREATININE-BSD FRML MDRD: 91 ML/MIN/1.73
GLOBULIN UR ELPH-MCNC: 3.6 GM/DL
GLUCOSE SERPL-MCNC: 129 MG/DL (ref 65–99)
HCT VFR BLD AUTO: 40.2 % (ref 37.5–51)
HGB BLD-MCNC: 13.4 G/DL (ref 13–17.7)
IMM GRANULOCYTES # BLD AUTO: 0.01 10*3/MM3 (ref 0–0.05)
IMM GRANULOCYTES NFR BLD AUTO: 0.2 % (ref 0–0.5)
LYMPHOCYTES # BLD AUTO: 1.62 10*3/MM3 (ref 0.7–3.1)
LYMPHOCYTES NFR BLD AUTO: 30.1 % (ref 19.6–45.3)
MCH RBC QN AUTO: 30.1 PG (ref 26.6–33)
MCHC RBC AUTO-ENTMCNC: 33.3 G/DL (ref 31.5–35.7)
MCV RBC AUTO: 90.3 FL (ref 79–97)
MONOCYTES # BLD AUTO: 0.58 10*3/MM3 (ref 0.1–0.9)
MONOCYTES NFR BLD AUTO: 10.8 % (ref 5–12)
NEUTROPHILS NFR BLD AUTO: 2.72 10*3/MM3 (ref 1.7–7)
NEUTROPHILS NFR BLD AUTO: 50.6 % (ref 42.7–76)
NRBC BLD AUTO-RTO: 0 /100 WBC (ref 0–0.2)
PLATELET # BLD AUTO: 314 10*3/MM3 (ref 140–450)
PMV BLD AUTO: 10.1 FL (ref 6–12)
POTASSIUM SERPL-SCNC: 4.6 MMOL/L (ref 3.5–5.2)
PROT SERPL-MCNC: 7.5 G/DL (ref 6–8.5)
RBC # BLD AUTO: 4.45 10*6/MM3 (ref 4.14–5.8)
SODIUM SERPL-SCNC: 136 MMOL/L (ref 136–145)
WBC # BLD AUTO: 5.38 10*3/MM3 (ref 3.4–10.8)

## 2020-07-13 PROCEDURE — 36415 COLL VENOUS BLD VENIPUNCTURE: CPT

## 2020-07-13 PROCEDURE — 82550 ASSAY OF CK (CPK): CPT

## 2020-07-13 PROCEDURE — 86140 C-REACTIVE PROTEIN: CPT

## 2020-07-13 PROCEDURE — 80053 COMPREHEN METABOLIC PANEL: CPT

## 2020-07-13 PROCEDURE — 85652 RBC SED RATE AUTOMATED: CPT

## 2020-07-13 PROCEDURE — 85025 COMPLETE CBC W/AUTO DIFF WBC: CPT

## 2020-07-14 ENCOUNTER — HOSPITAL ENCOUNTER (OUTPATIENT)
Dept: INFUSION THERAPY | Facility: HOSPITAL | Age: 60
Discharge: HOME | End: 2020-07-14
Admitting: PHYSICIAN ASSISTANT

## 2020-07-14 VITALS
DIASTOLIC BLOOD PRESSURE: 62 MMHG | TEMPERATURE: 98.5 F | RESPIRATION RATE: 18 BRPM | SYSTOLIC BLOOD PRESSURE: 111 MMHG | HEART RATE: 62 BPM

## 2020-07-14 DIAGNOSIS — L02.611 ABSCESS OF RIGHT FOOT: ICD-10-CM

## 2020-07-14 PROCEDURE — G0463 HOSPITAL OUTPT CLINIC VISIT: HCPCS

## 2020-07-14 NOTE — PATIENT INSTRUCTIONS
PICC Removal, Adult    A peripherally inserted central catheter (PICC) is a form of IV access that allows medicines and IV fluids to be quickly distributed throughout the body. The PICC is a thin, flexible tube (catheter) that is inserted into a vein in your arm or leg. The PICC is guided through your vein until the tip sits in a large vein just outside your heart (superior vena cava or SVC). A PICC may be removed if it is no longer needed, if it causes infection, or if it is not working properly.  This is usually a painless procedure. Only a trained health care provider should do this procedure. Do not try to remove a PICC line yourself.  Tell a health care provider about:  · Any allergies you have.  · All medicines you are taking, including vitamins, herbs, eye drops, creams, and over-the-counter medicines.  · Any problems you or family members have had with anesthetic medicines.  · Any blood disorders you have.  · Any surgeries you have had.  · Any medical conditions you have.  · Whether you are pregnant or may be pregnant.  What are the risks?  Generally, this is a safe procedure. However, problems may occur, including:  · Bleeding.  · Infection.  · Vein blockage due to an air bubble (air embolism).  What happens before the procedure?  · A conversation with your health care team. You need an order from your health care provider to have the PICC removed.  · Follow instructions from your health care provider about eating or drinking restrictions.  · Ask your health care provider about:  ? Changing or stopping your regular medicines. This is especially important if you are taking diabetes medicines or blood thinners.  ? Taking over-the-counter medicines, vitamins, herbs, and supplements.  ? Taking medicines such as aspirin and ibuprofen. These medicines can thin your blood. Do not take these medicines unless your health care provider tells you to take them.  · Plan to have a responsible adult care for you for at  least 24 hours after you leave the hospital or clinic. This is important.  What happens during the procedure?  · To reduce your risk of infection:  ? Your health care team will wash or sanitize their hands.  ? Your skin will be washed with soap.  ? Hair may be removed from the surgical area.  · You will lie down flat. Your head may be positioned slightly lower than your heart. You may be instructed to keep as still as possible during the procedure.  · The bandage (dressing) over the PICC will be removed.  · The place where the PICC tube exits the body (exit site) will be cleaned with a germ-killing (antiseptic) solution.  · A germ-free (sterile) gauze pad will be held gently over the exit site.  · The health care provider will pull out the PICC tube slowly and steadily. You may be asked to hold your breath or exhale while this is done.  · After the PICC tube is removed, the health care provider will gently press on the exit site for about five minutes.  · Antibiotic or petroleum-based ointment will be applied to the exit site.  · The exit site will be covered with an airtight (occlusive) sterile dressing, or another type of dressing.  · The PICC will be inspected to make sure that the entire tube has been removed. Part of the PICC may be tested for bacteria.  The procedure may vary among health care providers and hospitals.  What happens after the procedure?  · You may need to stay lying down.  · You will be monitored to make sure that:  ? There is no fluid draining from the exit site.  ? There are no signs of an air embolism.  Summary  · A PICC may be removed if it is no longer needed, if it causes infection, or if it is not working properly.  · Only a trained health care provider should do this procedure. Do not try to remove a PICC line yourself.  · Generally, this is a safe procedure. However, problems may occur, including bleeding, infection, or air embolism.  · Plan to have someone with you for 24 hours after  the procedure.  · After the procedure, you will be monitored to make sure that there is no fluid draining from the site and that there are no signs of an air embolism.  This information is not intended to replace advice given to you by your health care provider. Make sure you discuss any questions you have with your health care provider.  Document Released: 06/07/2011 Document Revised: 11/30/2018 Document Reviewed: 02/13/2018  Elsevier Patient Education © 2020 [employer]

## 2020-09-16 ENCOUNTER — TRANSCRIBE ORDERS (OUTPATIENT)
Dept: ADMINISTRATIVE | Facility: HOSPITAL | Age: 60
End: 2020-09-16

## 2020-09-16 DIAGNOSIS — Z01.818 PREOP EXAMINATION: Primary | ICD-10-CM

## 2020-09-18 ENCOUNTER — LAB (OUTPATIENT)
Dept: LAB | Facility: HOSPITAL | Age: 60
End: 2020-09-18

## 2020-09-18 DIAGNOSIS — Z01.818 PREOP EXAMINATION: ICD-10-CM

## 2020-09-18 LAB — SARS-COV-2 RNA NOSE QL NAA+PROBE: NOT DETECTED

## 2020-09-18 PROCEDURE — C9803 HOPD COVID-19 SPEC COLLECT: HCPCS

## 2020-09-18 PROCEDURE — U0004 COV-19 TEST NON-CDC HGH THRU: HCPCS

## 2021-10-01 ENCOUNTER — PREP FOR SURGERY (OUTPATIENT)
Dept: OTHER | Facility: HOSPITAL | Age: 61
End: 2021-10-01

## 2021-10-01 DIAGNOSIS — M86.471 CHRONIC OSTEOMYELITIS OF RIGHT FOOT WITH DRAINING SINUS (HCC): Primary | ICD-10-CM

## 2021-10-01 RX ORDER — CEFAZOLIN SODIUM 2 G/50ML
2 SOLUTION INTRAVENOUS ONCE
Status: CANCELLED | OUTPATIENT
Start: 2021-10-01 | End: 2021-10-01

## 2021-10-04 ENCOUNTER — LAB (OUTPATIENT)
Dept: LAB | Facility: HOSPITAL | Age: 61
End: 2021-10-04

## 2021-10-04 ENCOUNTER — TRANSCRIBE ORDERS (OUTPATIENT)
Dept: ADMINISTRATIVE | Facility: HOSPITAL | Age: 61
End: 2021-10-04

## 2021-10-04 DIAGNOSIS — Z01.818 PRE-OPERATIVE CLEARANCE: ICD-10-CM

## 2021-10-04 DIAGNOSIS — Z01.818 PRE-OPERATIVE CLEARANCE: Primary | ICD-10-CM

## 2021-10-04 PROBLEM — M86.471 CHRONIC OSTEOMYELITIS OF RIGHT FOOT WITH DRAINING SINUS (HCC): Status: ACTIVE | Noted: 2021-10-04

## 2021-10-04 LAB
FLUAV SUBTYP SPEC NAA+PROBE: NOT DETECTED
FLUBV RNA ISLT QL NAA+PROBE: NOT DETECTED
SARS-COV-2 RNA PNL SPEC NAA+PROBE: NOT DETECTED

## 2021-10-04 PROCEDURE — C9803 HOPD COVID-19 SPEC COLLECT: HCPCS

## 2021-10-04 PROCEDURE — 87636 SARSCOV2 & INF A&B AMP PRB: CPT | Performed by: PODIATRIST

## 2021-10-05 ENCOUNTER — HOSPITAL ENCOUNTER (OUTPATIENT)
Facility: HOSPITAL | Age: 61
Setting detail: HOSPITAL OUTPATIENT SURGERY
Discharge: HOME | End: 2021-10-05
Attending: PODIATRIST | Admitting: PODIATRIST

## 2021-10-05 ENCOUNTER — ANESTHESIA EVENT (OUTPATIENT)
Dept: PERIOP | Facility: HOSPITAL | Age: 61
End: 2021-10-05

## 2021-10-05 ENCOUNTER — ANESTHESIA (OUTPATIENT)
Dept: PERIOP | Facility: HOSPITAL | Age: 61
End: 2021-10-05

## 2021-10-05 VITALS
HEART RATE: 64 BPM | OXYGEN SATURATION: 98 % | TEMPERATURE: 97 F | SYSTOLIC BLOOD PRESSURE: 142 MMHG | RESPIRATION RATE: 18 BRPM | HEIGHT: 70 IN | DIASTOLIC BLOOD PRESSURE: 82 MMHG | WEIGHT: 177 LBS | BODY MASS INDEX: 25.34 KG/M2

## 2021-10-05 DIAGNOSIS — M86.471 CHRONIC OSTEOMYELITIS OF RIGHT FOOT WITH DRAINING SINUS (HCC): ICD-10-CM

## 2021-10-05 LAB
GLUCOSE BLDC GLUCOMTR-MCNC: 120 MG/DL (ref 70–130)
GLUCOSE BLDC GLUCOMTR-MCNC: 133 MG/DL (ref 70–130)

## 2021-10-05 PROCEDURE — 87147 CULTURE TYPE IMMUNOLOGIC: CPT | Performed by: PODIATRIST

## 2021-10-05 PROCEDURE — 87070 CULTURE OTHR SPECIMN AEROBIC: CPT | Performed by: PODIATRIST

## 2021-10-05 PROCEDURE — 82962 GLUCOSE BLOOD TEST: CPT

## 2021-10-05 PROCEDURE — 25010000002 VANCOMYCIN 1 G RECONSTITUTED SOLUTION 1 EACH VIAL: Performed by: PODIATRIST

## 2021-10-05 PROCEDURE — 25010000003 CEFAZOLIN SODIUM-DEXTROSE 2-3 GM-%(50ML) RECONSTITUTED SOLUTION: Performed by: PODIATRIST

## 2021-10-05 PROCEDURE — 87186 SC STD MICRODIL/AGAR DIL: CPT | Performed by: PODIATRIST

## 2021-10-05 PROCEDURE — 25010000002 FENTANYL CITRATE (PF) 50 MCG/ML SOLUTION: Performed by: NURSE ANESTHETIST, CERTIFIED REGISTERED

## 2021-10-05 PROCEDURE — 25010000002 PROPOFOL 10 MG/ML EMULSION: Performed by: NURSE ANESTHETIST, CERTIFIED REGISTERED

## 2021-10-05 PROCEDURE — 25010000002 ONDANSETRON PER 1 MG: Performed by: NURSE ANESTHETIST, CERTIFIED REGISTERED

## 2021-10-05 PROCEDURE — 25010000002 MIDAZOLAM PER 1 MG: Performed by: NURSE ANESTHETIST, CERTIFIED REGISTERED

## 2021-10-05 PROCEDURE — 87205 SMEAR GRAM STAIN: CPT | Performed by: PODIATRIST

## 2021-10-05 PROCEDURE — 25010000003 LIDOCAINE 1 % SOLUTION: Performed by: PODIATRIST

## 2021-10-05 PROCEDURE — 25010000002 VANCOMYCIN 1 G RECONSTITUTED SOLUTION: Performed by: PODIATRIST

## 2021-10-05 RX ORDER — SODIUM CHLORIDE 0.9 % (FLUSH) 0.9 %
10 SYRINGE (ML) INJECTION EVERY 12 HOURS SCHEDULED
Status: DISCONTINUED | OUTPATIENT
Start: 2021-10-05 | End: 2021-10-05 | Stop reason: HOSPADM

## 2021-10-05 RX ORDER — DROPERIDOL 2.5 MG/ML
0.62 INJECTION, SOLUTION INTRAMUSCULAR; INTRAVENOUS ONCE AS NEEDED
Status: DISCONTINUED | OUTPATIENT
Start: 2021-10-05 | End: 2021-10-05 | Stop reason: HOSPADM

## 2021-10-05 RX ORDER — MIDAZOLAM HYDROCHLORIDE 1 MG/ML
INJECTION INTRAMUSCULAR; INTRAVENOUS AS NEEDED
Status: DISCONTINUED | OUTPATIENT
Start: 2021-10-05 | End: 2021-10-05 | Stop reason: SURG

## 2021-10-05 RX ORDER — MIDAZOLAM HYDROCHLORIDE 1 MG/ML
1 INJECTION INTRAMUSCULAR; INTRAVENOUS
Status: DISCONTINUED | OUTPATIENT
Start: 2021-10-05 | End: 2021-10-05 | Stop reason: HOSPADM

## 2021-10-05 RX ORDER — FENTANYL CITRATE 50 UG/ML
INJECTION, SOLUTION INTRAMUSCULAR; INTRAVENOUS AS NEEDED
Status: DISCONTINUED | OUTPATIENT
Start: 2021-10-05 | End: 2021-10-05 | Stop reason: SURG

## 2021-10-05 RX ORDER — BUPIVACAINE HYDROCHLORIDE 5 MG/ML
INJECTION, SOLUTION PERINEURAL AS NEEDED
Status: DISCONTINUED | OUTPATIENT
Start: 2021-10-05 | End: 2021-10-05 | Stop reason: HOSPADM

## 2021-10-05 RX ORDER — ONDANSETRON 2 MG/ML
4 INJECTION INTRAMUSCULAR; INTRAVENOUS AS NEEDED
Status: DISCONTINUED | OUTPATIENT
Start: 2021-10-05 | End: 2021-10-05 | Stop reason: HOSPADM

## 2021-10-05 RX ORDER — HYDROCODONE BITARTRATE AND ACETAMINOPHEN 5; 325 MG/1; MG/1
1 TABLET ORAL EVERY 6 HOURS PRN
Qty: 20 TABLET | Refills: 0 | Status: SHIPPED | OUTPATIENT
Start: 2021-10-05

## 2021-10-05 RX ORDER — OXYCODONE HYDROCHLORIDE AND ACETAMINOPHEN 5; 325 MG/1; MG/1
1 TABLET ORAL ONCE AS NEEDED
Status: DISCONTINUED | OUTPATIENT
Start: 2021-10-05 | End: 2021-10-05 | Stop reason: HOSPADM

## 2021-10-05 RX ORDER — SODIUM CHLORIDE 0.9 % (FLUSH) 0.9 %
10 SYRINGE (ML) INJECTION AS NEEDED
Status: DISCONTINUED | OUTPATIENT
Start: 2021-10-05 | End: 2021-10-05 | Stop reason: HOSPADM

## 2021-10-05 RX ORDER — LIDOCAINE HYDROCHLORIDE 10 MG/ML
INJECTION, SOLUTION INFILTRATION; PERINEURAL AS NEEDED
Status: DISCONTINUED | OUTPATIENT
Start: 2021-10-05 | End: 2021-10-05 | Stop reason: HOSPADM

## 2021-10-05 RX ORDER — VANCOMYCIN HYDROCHLORIDE 1 G/20ML
INJECTION, POWDER, LYOPHILIZED, FOR SOLUTION INTRAVENOUS AS NEEDED
Status: DISCONTINUED | OUTPATIENT
Start: 2021-10-05 | End: 2021-10-05 | Stop reason: HOSPADM

## 2021-10-05 RX ORDER — KETOROLAC TROMETHAMINE 30 MG/ML
30 INJECTION, SOLUTION INTRAMUSCULAR; INTRAVENOUS EVERY 6 HOURS PRN
Status: DISCONTINUED | OUTPATIENT
Start: 2021-10-05 | End: 2021-10-05 | Stop reason: HOSPADM

## 2021-10-05 RX ORDER — SODIUM CHLORIDE, SODIUM LACTATE, POTASSIUM CHLORIDE, CALCIUM CHLORIDE 600; 310; 30; 20 MG/100ML; MG/100ML; MG/100ML; MG/100ML
125 INJECTION, SOLUTION INTRAVENOUS ONCE
Status: COMPLETED | OUTPATIENT
Start: 2021-10-05 | End: 2021-10-05

## 2021-10-05 RX ORDER — FENTANYL CITRATE 50 UG/ML
50 INJECTION, SOLUTION INTRAMUSCULAR; INTRAVENOUS
Status: DISCONTINUED | OUTPATIENT
Start: 2021-10-05 | End: 2021-10-05 | Stop reason: HOSPADM

## 2021-10-05 RX ORDER — CEFAZOLIN SODIUM 2 G/50ML
2 SOLUTION INTRAVENOUS ONCE
Status: COMPLETED | OUTPATIENT
Start: 2021-10-05 | End: 2021-10-05

## 2021-10-05 RX ORDER — MEPERIDINE HYDROCHLORIDE 25 MG/ML
12.5 INJECTION INTRAMUSCULAR; INTRAVENOUS; SUBCUTANEOUS
Status: DISCONTINUED | OUTPATIENT
Start: 2021-10-05 | End: 2021-10-05 | Stop reason: HOSPADM

## 2021-10-05 RX ORDER — SODIUM CHLORIDE, SODIUM LACTATE, POTASSIUM CHLORIDE, CALCIUM CHLORIDE 600; 310; 30; 20 MG/100ML; MG/100ML; MG/100ML; MG/100ML
100 INJECTION, SOLUTION INTRAVENOUS ONCE AS NEEDED
Status: DISCONTINUED | OUTPATIENT
Start: 2021-10-05 | End: 2021-10-05 | Stop reason: HOSPADM

## 2021-10-05 RX ORDER — CIPROFLOXACIN 500 MG/1
500 TABLET, FILM COATED ORAL 2 TIMES DAILY
Qty: 20 TABLET | Refills: 0 | Status: SHIPPED | OUTPATIENT
Start: 2021-10-05 | End: 2021-10-15

## 2021-10-05 RX ORDER — IPRATROPIUM BROMIDE AND ALBUTEROL SULFATE 2.5; .5 MG/3ML; MG/3ML
3 SOLUTION RESPIRATORY (INHALATION) ONCE AS NEEDED
Status: DISCONTINUED | OUTPATIENT
Start: 2021-10-05 | End: 2021-10-05 | Stop reason: HOSPADM

## 2021-10-05 RX ORDER — SODIUM CHLORIDE, SODIUM LACTATE, POTASSIUM CHLORIDE, CALCIUM CHLORIDE 600; 310; 30; 20 MG/100ML; MG/100ML; MG/100ML; MG/100ML
INJECTION, SOLUTION INTRAVENOUS CONTINUOUS PRN
Status: DISCONTINUED | OUTPATIENT
Start: 2021-10-05 | End: 2021-10-05 | Stop reason: SURG

## 2021-10-05 RX ORDER — FAMOTIDINE 10 MG/ML
INJECTION, SOLUTION INTRAVENOUS AS NEEDED
Status: DISCONTINUED | OUTPATIENT
Start: 2021-10-05 | End: 2021-10-05 | Stop reason: SURG

## 2021-10-05 RX ORDER — PROPOFOL 10 MG/ML
VIAL (ML) INTRAVENOUS AS NEEDED
Status: DISCONTINUED | OUTPATIENT
Start: 2021-10-05 | End: 2021-10-05 | Stop reason: SURG

## 2021-10-05 RX ORDER — LIDOCAINE HYDROCHLORIDE 20 MG/ML
INJECTION, SOLUTION INFILTRATION; PERINEURAL AS NEEDED
Status: DISCONTINUED | OUTPATIENT
Start: 2021-10-05 | End: 2021-10-05 | Stop reason: SURG

## 2021-10-05 RX ORDER — ONDANSETRON 2 MG/ML
INJECTION INTRAMUSCULAR; INTRAVENOUS AS NEEDED
Status: DISCONTINUED | OUTPATIENT
Start: 2021-10-05 | End: 2021-10-05 | Stop reason: SURG

## 2021-10-05 RX ADMIN — SODIUM CHLORIDE, POTASSIUM CHLORIDE, SODIUM LACTATE AND CALCIUM CHLORIDE 125 ML/HR: 600; 310; 30; 20 INJECTION, SOLUTION INTRAVENOUS at 07:08

## 2021-10-05 RX ADMIN — EPHEDRINE SULFATE 10 MG: 50 INJECTION, SOLUTION INTRAVENOUS at 08:16

## 2021-10-05 RX ADMIN — LIDOCAINE HYDROCHLORIDE 60 MG: 20 INJECTION, SOLUTION INFILTRATION; PERINEURAL at 07:50

## 2021-10-05 RX ADMIN — EPHEDRINE SULFATE 10 MG: 50 INJECTION, SOLUTION INTRAVENOUS at 08:19

## 2021-10-05 RX ADMIN — FAMOTIDINE 20 MG: 10 INJECTION INTRAVENOUS at 07:50

## 2021-10-05 RX ADMIN — CEFAZOLIN SODIUM 2 G: 2 SOLUTION INTRAVENOUS at 07:51

## 2021-10-05 RX ADMIN — FENTANYL CITRATE 100 MCG: 50 INJECTION INTRAMUSCULAR; INTRAVENOUS at 07:50

## 2021-10-05 RX ADMIN — MIDAZOLAM 2 MG: 1 INJECTION INTRAMUSCULAR; INTRAVENOUS at 07:50

## 2021-10-05 RX ADMIN — EPHEDRINE SULFATE 10 MG: 50 INJECTION, SOLUTION INTRAVENOUS at 08:12

## 2021-10-05 RX ADMIN — PROPOFOL 150 MG: 10 INJECTION, EMULSION INTRAVENOUS at 07:55

## 2021-10-05 RX ADMIN — SODIUM CHLORIDE, POTASSIUM CHLORIDE, SODIUM LACTATE AND CALCIUM CHLORIDE: 600; 310; 30; 20 INJECTION, SOLUTION INTRAVENOUS at 07:51

## 2021-10-05 RX ADMIN — ONDANSETRON 4 MG: 2 INJECTION INTRAMUSCULAR; INTRAVENOUS at 07:50

## 2021-10-05 NOTE — ANESTHESIA POSTPROCEDURE EVALUATION
Patient: Davian Newell    Procedure Summary     Date: 10/05/21 Room / Location: [facility] OR 03 /  [facility]    Anesthesia Start: 0750 Anesthesia Stop: 0851    Procedure: AMPUTATION DIGIT RIGHT 5TH METATARSAL HEAD (Right Fingers) Diagnosis:       Chronic osteomyelitis of right foot with draining sinus (HCC)      (Chronic osteomyelitis of right foot with draining sinus (HCC) [M86.471])    Surgeons: Daisy Gerard DPM Provider: James Weinberg MD    Anesthesia Type: general ASA Status: 3          Anesthesia Type: general    Vitals  Vitals Value Taken Time   /84 10/05/21 0922   Temp 97 °F (36.1 °C) 10/05/21 0922   Pulse 60 10/05/21 0922   Resp 18 10/05/21 0922   SpO2 98 % 10/05/21 0922           Post Anesthesia Care and Evaluation    Patient location during evaluation: PHASE II  Patient participation: complete - patient participated  Level of consciousness: awake and alert  Pain score: 0  Pain management: adequate  Airway patency: patent  Anesthetic complications: No anesthetic complications    Cardiovascular status: acceptable  Respiratory status: acceptable  Hydration status: acceptable

## 2021-10-05 NOTE — H&P
PODIATRIC SURGERY  History and Physical      Principal problem: Chronic osteomyelitis of right foot with draining sinus (HCC)    Subjective .     History of present illness:    Mr. Davian Newell is a 61 y.o. years old male with past medical history significant for diabetes. He has a chronic ulceration to the plantar aspect of his right 5th metatarsal head. It has not healed despite local wound care, offloading, and antibiotics. Radiographs show mild osteolysis of the metatarsal head concerning for early osteomyelitis. Due to the long standing nature of the wound without improvement, the patient wishes to proceed with amputation of the 5th metatarsal head and the 5th toe without further imaging.      History taken from: patient    Case was discussed with patient    Review of Systems    Constitutional: no fever, chills and night sweats. No appetite change or unexpected weight change. No fatigue.  Eyes: no eye drainage, itching or redness.  HEENT: no mouth sores, dysphagia or nose bleed.  Respiratory: no for shortness of breath, cough or production of sputum.  Cardiovascular: no chest pain, no palpitations, no orthopnea.  Gastrointestinal: no nausea, vomiting or diarrhea. No abdominal pain, hematemesis or rectal bleeding.  Genitourinary: no dysuria or polyuria.  Hematologic/lymphatic: no lymph node abnormalities, no easy bruising or easy bleeding.  Musculoskeletal: no muscle or joint pain.  Skin: No rash and no itching.  Neurological: no loss of consciousness, no seizure, no headache.  Behavioral/Psych: no depression or suicidal ideation.  Endocrine: no hot flashes.  Immunologic: negative.    Past Medical History    Past Medical History:   Diagnosis Date   • Arthritis    • Diabetes mellitus (HCC)    • GERD (gastroesophageal reflux disease) 6/17/2020   • Heartburn        Past Surgical History    Past Surgical History:   Procedure Laterality Date   • ANKLE ARTHROSCOPY Right     compound fx hardware present   •  "COLONOSCOPY     • FRACTURE SURGERY     • LEG EXCISION LESION/CYST Right 6/16/2020    Procedure: INCISION AND DRAINAGE LOWER EXTREMITY;  Surgeon: Daisy Gerard DPM;  Location: [facility];  Service: Podiatry;  Laterality: Right;       Family History    History reviewed. No pertinent family history.    Social History    Social History     Tobacco Use   • Smoking status: Never Smoker   • Smokeless tobacco: Never Used   Substance Use Topics   • Alcohol use: Yes     Comment: occassionally   • Drug use: No       Allergies    Patient has no known allergies.    Objective     /87 (BP Location: Left arm, Patient Position: Lying)   Pulse 61   Temp 97.9 °F (36.6 °C) (Oral)   Resp 20   Ht 177.8 cm (70\")   Wt 80.3 kg (177 lb)   SpO2 97%   BMI 25.40 kg/m²     Temp:  [97.9 °F (36.6 °C)] 97.9 °F (36.6 °C)      No intake or output data in the 24 hours ending 10/05/21 0739      Physical Exam:     General Appearance:    Alert, cooperative, in no acute distress   Head:    Normocephalic, without obvious abnormality, atraumatic    Heart:    Regular rhythm and normal rate     Chest Wall:    No abnormalities observed   Abdomen:    Soft non-tender, non-distended, no guarding, no rebound                tenderness   Extremities:   Moves all extremities well, no edema, no cyanosis, no             Redness. Ulcer plantar right 5th metatarsal head.   Pulses:   Pulses palpable and equal bilaterally   Skin:   No bleeding, bruising or rash   Lymph nodes:   No palpable adenopathy                   Lab Results   Component Value Date    NEUTROABS 2.72 07/13/2020                   Imaging Results (Last 24 Hours)     ** No results found for the last 24 hours. **            Results Review:    I have personally reviewed laboratory data, culture results, radiology studies and antimicrobial therapy.    Hospital Medications (active)       Dose Frequency Start End    ceFAZolin Sodium-Dextrose (ANCEF) IVPB (duplex) 2 g 2 g Once 10/5/2021     Admin " Instructions: Administer Within 1 Hour of Surgical Incision.<BR>Redose 4 Hours From Pre-Op Dose if Procedure Ongoing or Blood Loss Greater Than 1.5 L<BR>Caution: Look alike/sound alike drug alert    Route: Intravenous    midazolam (VERSED) injection 1 mg 1 mg Every 10 Minutes PRN 10/5/2021     Admin Instructions: May repeat dose in 10 minutes one time then contact provider for additional orders.<BR>    Route: Intravenous    sodium chloride 0.9 % flush 10 mL 10 mL Every 12 Hours Scheduled 10/5/2021     Route: Intravenous    sodium chloride 0.9 % flush 10 mL 10 mL As Needed 10/5/2021     Route: Intravenous            PROBLEM LIST:    Patient Active Problem List   Diagnosis   • Cellulitis of foot, right   • Hyperglycemia   • Abscess of right foot   • T2DM (type 2 diabetes mellitus) ([facility])   • GERD (gastroesophageal reflux disease)   • Hypoalbuminemia   • Chronic osteomyelitis of right foot with draining sinus ([facility])       Assessment/Plan     ASSESSMENT:    Plantar 5th metatarsal head neuropathic ulceration     PLAN:    Proceeding with amputation of the right 5th metatarsal head and 5th toe after thorough discussion of all alternatives, risks, and benefits.      Patients findings and recommendations were discussed with patient    Code Status:   There are no questions and answers to display.       Daisy Gerard DPM  10/05/21  07:39 EDT

## 2021-10-05 NOTE — ANESTHESIA PREPROCEDURE EVALUATION
Anesthesia Evaluation     no history of anesthetic complications:  NPO Solid Status: > 8 hours  NPO Liquid Status: > 8 hours           Airway   Mallampati: II  TM distance: >3 FB  Neck ROM: full  No difficulty expected  Dental - normal exam     Pulmonary - normal exam   Cardiovascular - normal exam        Neuro/Psych  GI/Hepatic/Renal/Endo    (+)  GERD,  diabetes mellitus,     Musculoskeletal     Abdominal  - normal exam    Bowel sounds: normal.   Substance History      OB/GYN          Other                      Anesthesia Plan    ASA 3     general     intravenous induction     Anesthetic plan, all risks, benefits, and alternatives have been provided, discussed and informed consent has been obtained with: patient.

## 2021-10-09 LAB
BACTERIA SPEC AEROBE CULT: ABNORMAL
BACTERIA SPEC AEROBE CULT: ABNORMAL
GRAM STN SPEC: ABNORMAL
GRAM STN SPEC: ABNORMAL

## 2021-10-11 LAB — LAB AP CASE REPORT: NORMAL

## 2024-09-19 ENCOUNTER — TRANSCRIBE ORDERS (OUTPATIENT)
Dept: WOUND CARE | Facility: HOSPITAL | Age: 64
End: 2024-09-19
Payer: COMMERCIAL

## 2024-09-19 DIAGNOSIS — M86.671: Primary | ICD-10-CM

## 2024-09-25 ENCOUNTER — HOSPITAL ENCOUNTER (OUTPATIENT)
Dept: INFUSION THERAPY | Facility: HOSPITAL | Age: 64
Discharge: HOME | End: 2024-09-25
Admitting: PODIATRIST
Payer: COMMERCIAL

## 2024-09-25 VITALS
RESPIRATION RATE: 18 BRPM | OXYGEN SATURATION: 98 % | DIASTOLIC BLOOD PRESSURE: 80 MMHG | SYSTOLIC BLOOD PRESSURE: 175 MMHG | HEART RATE: 75 BPM

## 2024-09-25 DIAGNOSIS — M86.471 CHRONIC OSTEOMYELITIS OF RIGHT FOOT WITH DRAINING SINUS: Primary | ICD-10-CM

## 2024-09-25 PROCEDURE — 25010000002 DALBAVANCIN 500 MG RECONSTITUTED SOLUTION: Performed by: PODIATRIST

## 2024-09-25 PROCEDURE — 96365 THER/PROPH/DIAG IV INF INIT: CPT

## 2024-09-25 PROCEDURE — 0 DEXTROSE 5 % SOLUTION: Performed by: PODIATRIST

## 2024-09-25 RX ORDER — DEXTROSE MONOHYDRATE 50 MG/ML
10 INJECTION, SOLUTION INTRAVENOUS ONCE
Status: COMPLETED | OUTPATIENT
Start: 2024-09-25 | End: 2024-09-25

## 2024-09-25 RX ADMIN — DEXTROSE MONOHYDRATE 10 ML: 50 INJECTION, SOLUTION INTRAVENOUS at 14:08

## 2024-09-25 RX ADMIN — DALBAVANCIN 1500 MG: 500 INJECTION, POWDER, FOR SOLUTION INTRAVENOUS at 14:08

## 2024-10-03 ENCOUNTER — PREP FOR SURGERY (OUTPATIENT)
Dept: OTHER | Facility: HOSPITAL | Age: 64
End: 2024-10-03
Payer: COMMERCIAL

## 2024-10-03 DIAGNOSIS — M86.9 OSTEOMYELITIS OF THIRD TOE OF RIGHT FOOT: Primary | ICD-10-CM

## 2024-10-14 NOTE — PRE-PROCEDURE INSTRUCTIONS
PAT phone history completed with patient for upcoming procedure on 10/18/24.    PAT PASS reviewed with patient and he/she verbalized understanding of the following:     Do not eat or drink anything after midnight the night before procedure unless otherwise instructed by physician/surgeon's office, this includes no gum, candy, mints, tobacco products or e-cigarettes.  Do not shave the area to be operated on at least 48 hours prior to procedure.  Do not wear makeup, lotion, hair products, or nail polish.  Do not wear any jewelry and remove all piercings.  Do not wear any adhesive if you wear dentures.  Do not wear contacts; bring in glasses if needed.  Only take medications on the morning of procedure as instructed by PAT nurse per anesthesia guidelines or as instructed by physician's office.   If you are on any blood thinners reach out to the physician/surgeon's office for instructions on when/if they will need to be stopped prior to procedure.   Bring in picture ID and insurance card, advanced directive copies if applicable, CPAP/BIPAP/Inhalers if indicated morning of procedure, leave any other valuables at home.  Ensure you have arranged for someone to drive you home the day of your procedure and someone to care for you at home afterwards. It is recommended that you do not drive, drink alcohol, or make any major legal decisions for at least 24 hours after your procedure is complete.    Instructions given on hospital entrance and registration location.

## 2024-10-18 ENCOUNTER — APPOINTMENT (OUTPATIENT)
Dept: GENERAL RADIOLOGY | Facility: HOSPITAL | Age: 64
End: 2024-10-18
Payer: COMMERCIAL

## 2024-10-18 ENCOUNTER — ANESTHESIA EVENT (OUTPATIENT)
Dept: PERIOP | Facility: HOSPITAL | Age: 64
End: 2024-10-18
Payer: COMMERCIAL

## 2024-10-18 ENCOUNTER — ANESTHESIA (OUTPATIENT)
Dept: PERIOP | Facility: HOSPITAL | Age: 64
End: 2024-10-18
Payer: COMMERCIAL

## 2024-10-18 ENCOUNTER — HOSPITAL ENCOUNTER (OUTPATIENT)
Facility: HOSPITAL | Age: 64
Setting detail: HOSPITAL OUTPATIENT SURGERY
Discharge: HOME | End: 2024-10-18
Attending: PODIATRIST | Admitting: PODIATRIST
Payer: COMMERCIAL

## 2024-10-18 VITALS
DIASTOLIC BLOOD PRESSURE: 84 MMHG | RESPIRATION RATE: 20 BRPM | HEART RATE: 59 BPM | TEMPERATURE: 97.2 F | WEIGHT: 185 LBS | OXYGEN SATURATION: 98 % | HEIGHT: 70 IN | SYSTOLIC BLOOD PRESSURE: 160 MMHG | BODY MASS INDEX: 26.48 KG/M2

## 2024-10-18 DIAGNOSIS — M86.9 OSTEOMYELITIS OF THIRD TOE OF RIGHT FOOT: ICD-10-CM

## 2024-10-18 LAB
BILIRUB UR QL STRIP: NEGATIVE
CLARITY UR: CLEAR
COLOR UR: YELLOW
GLUCOSE BLDC GLUCOMTR-MCNC: 115 MG/DL (ref 70–130)
GLUCOSE UR STRIP-MCNC: NEGATIVE MG/DL
HGB UR QL STRIP.AUTO: NEGATIVE
KETONES UR QL STRIP: NEGATIVE
LEUKOCYTE ESTERASE UR QL STRIP.AUTO: NEGATIVE
NITRITE UR QL STRIP: NEGATIVE
PH UR STRIP.AUTO: 5.5 [PH] (ref 5–8)
PROT UR QL STRIP: NEGATIVE
SP GR UR STRIP: 1.02 (ref 1–1.03)
UROBILINOGEN UR QL STRIP: NORMAL

## 2024-10-18 PROCEDURE — 87077 CULTURE AEROBIC IDENTIFY: CPT | Performed by: PODIATRIST

## 2024-10-18 PROCEDURE — 81003 URINALYSIS AUTO W/O SCOPE: CPT | Performed by: PODIATRIST

## 2024-10-18 PROCEDURE — 87205 SMEAR GRAM STAIN: CPT | Performed by: PODIATRIST

## 2024-10-18 PROCEDURE — 25010000002 BUPIVACAINE (PF) 0.5 % SOLUTION 30 ML VIAL: Performed by: PODIATRIST

## 2024-10-18 PROCEDURE — 25810000003 LACTATED RINGERS PER 1000 ML: Performed by: PODIATRIST

## 2024-10-18 PROCEDURE — 87075 CULTR BACTERIA EXCEPT BLOOD: CPT | Performed by: PODIATRIST

## 2024-10-18 PROCEDURE — 87015 SPECIMEN INFECT AGNT CONCNTJ: CPT | Performed by: PODIATRIST

## 2024-10-18 PROCEDURE — 25010000002 VANCOMYCIN 1 G RECONSTITUTED SOLUTION: Performed by: PODIATRIST

## 2024-10-18 PROCEDURE — 73630 X-RAY EXAM OF FOOT: CPT

## 2024-10-18 PROCEDURE — 25010000002 LIDOCAINE (CARDIAC): Performed by: NURSE ANESTHETIST, CERTIFIED REGISTERED

## 2024-10-18 PROCEDURE — 87186 SC STD MICRODIL/AGAR DIL: CPT | Performed by: PODIATRIST

## 2024-10-18 PROCEDURE — 87070 CULTURE OTHR SPECIMN AEROBIC: CPT | Performed by: PODIATRIST

## 2024-10-18 PROCEDURE — 82948 REAGENT STRIP/BLOOD GLUCOSE: CPT

## 2024-10-18 PROCEDURE — 25010000002 PROPOFOL 10 MG/ML EMULSION: Performed by: NURSE ANESTHETIST, CERTIFIED REGISTERED

## 2024-10-18 PROCEDURE — 25010000002 BUPIVACAINE 0.5 % SOLUTION: Performed by: PODIATRIST

## 2024-10-18 PROCEDURE — 25010000002 CEFAZOLIN PER 500 MG: Performed by: PODIATRIST

## 2024-10-18 PROCEDURE — 25010000002 LIDOCAINE 1 % SOLUTION 10 ML VIAL: Performed by: PODIATRIST

## 2024-10-18 RX ORDER — SODIUM CHLORIDE, SODIUM LACTATE, POTASSIUM CHLORIDE, CALCIUM CHLORIDE 600; 310; 30; 20 MG/100ML; MG/100ML; MG/100ML; MG/100ML
75 INJECTION, SOLUTION INTRAVENOUS CONTINUOUS
Status: DISCONTINUED | OUTPATIENT
Start: 2024-10-18 | End: 2024-10-18 | Stop reason: HOSPADM

## 2024-10-18 RX ORDER — HYDROCODONE BITARTRATE AND ACETAMINOPHEN 5; 325 MG/1; MG/1
TABLET ORAL
Qty: 15 TABLET | Refills: 0 | Status: SHIPPED | OUTPATIENT
Start: 2024-10-18

## 2024-10-18 RX ORDER — PROPOFOL 10 MG/ML
VIAL (ML) INTRAVENOUS AS NEEDED
Status: DISCONTINUED | OUTPATIENT
Start: 2024-10-18 | End: 2024-10-18 | Stop reason: SURG

## 2024-10-18 RX ORDER — BUPIVACAINE HYDROCHLORIDE 5 MG/ML
INJECTION, SOLUTION PERINEURAL AS NEEDED
Status: DISCONTINUED | OUTPATIENT
Start: 2024-10-18 | End: 2024-10-18 | Stop reason: HOSPADM

## 2024-10-18 RX ORDER — VANCOMYCIN HYDROCHLORIDE 1 G/20ML
INJECTION, POWDER, LYOPHILIZED, FOR SOLUTION INTRAVENOUS AS NEEDED
Status: DISCONTINUED | OUTPATIENT
Start: 2024-10-18 | End: 2024-10-18 | Stop reason: HOSPADM

## 2024-10-18 RX ORDER — MAGNESIUM HYDROXIDE 1200 MG/15ML
LIQUID ORAL AS NEEDED
Status: DISCONTINUED | OUTPATIENT
Start: 2024-10-18 | End: 2024-10-18 | Stop reason: HOSPADM

## 2024-10-18 RX ADMIN — PROPOFOL 50 MG: 10 INJECTION, EMULSION INTRAVENOUS at 14:29

## 2024-10-18 RX ADMIN — LIDOCAINE HYDROCHLORIDE 40 MG: 20 INJECTION, SOLUTION INTRAVENOUS at 14:29

## 2024-10-18 RX ADMIN — Medication 20 MG: at 14:29

## 2024-10-18 RX ADMIN — SODIUM CHLORIDE 2000 MG: 9 INJECTION, SOLUTION INTRAVENOUS at 14:29

## 2024-10-18 RX ADMIN — SODIUM CHLORIDE, POTASSIUM CHLORIDE, SODIUM LACTATE AND CALCIUM CHLORIDE 75 ML/HR: 600; 310; 30; 20 INJECTION, SOLUTION INTRAVENOUS at 12:59

## 2024-10-18 RX ADMIN — PROPOFOL 140 MCG/KG/MIN: 10 INJECTION, EMULSION INTRAVENOUS at 14:33

## 2024-10-18 NOTE — H&P
PODIATRIC SURGERY  History and Physical      Principal problem: Osteomyelitis of third toe of right foot    Subjective .     History of present illness:    Mr. Davian Newell is a 64 y.o. years old male with past medical history significant for diabetes, previous right foot partial 5th ray amputation. He has had a chronic ulceration to the distal right 3rd toe that has subsequently developed underlying osteomyelitis. He elects to proceed today with amputation of the toe after thorough discussion of all alternatives, risks, and benefits.     History taken from: patient    Case was discussed with patient    Review of Systems    Constitutional: no fever, chills and night sweats. No appetite change or unexpected weight change. No fatigue.  Eyes: no eye drainage, itching or redness.  HEENT: no mouth sores, dysphagia or nose bleed.  Respiratory: no for shortness of breath, cough or production of sputum.  Cardiovascular: no chest pain, no palpitations, no orthopnea.  Gastrointestinal: no nausea, vomiting or diarrhea. No abdominal pain, hematemesis or rectal bleeding.  Genitourinary: no dysuria or polyuria.  Hematologic/lymphatic: no lymph node abnormalities, no easy bruising or easy bleeding.  Musculoskeletal: no muscle or joint pain.  Skin: No rash and no itching.  Neurological: no loss of consciousness, no seizure, no headache.  Behavioral/Psych: no depression or suicidal ideation.  Endocrine: no hot flashes.  Immunologic: negative.    Past Medical History    Past Medical History:   Diagnosis Date    Arthritis     Diabetes mellitus     GERD (gastroesophageal reflux disease) 06/17/2020    occasional    Osteomyelitis of foot     chronic, right foot - followed by Dr. Gerard       Past Surgical History    Past Surgical History:   Procedure Laterality Date    AMPUTATION DIGIT Right 10/05/2021    Procedure: AMPUTATION DIGIT RIGHT 5TH METATARSAL HEAD;  Surgeon: Daisy Gerard DPM;  Location: [facility];  Service: Podiatry;   "Laterality: Right;    COLONOSCOPY      LEG EXCISION LESION/CYST Right 06/16/2020    Procedure: INCISION AND DRAINAGE LOWER EXTREMITY;  Surgeon: Daisy Gerard DPM;  Location: [facility];  Service: Podiatry;  Laterality: Right;    ORIF ANKLE FRACTURE Right     compound fx hardware present       Family History    History reviewed. No pertinent family history.    Social History    Social History     Tobacco Use    Smoking status: Never    Smokeless tobacco: Never   Vaping Use    Vaping status: Never Used   Substance Use Topics    Alcohol use: Yes     Comment: rarely    Drug use: No       Allergies    Patient has no known allergies.    Objective     /94 (BP Location: Right arm, Patient Position: Sitting)   Pulse 64   Temp 97.5 °F (36.4 °C) (Temporal)   Resp 17   Ht 177.8 cm (70\")   Wt 83.9 kg (185 lb)   SpO2 98%   BMI 26.54 kg/m²     Temp:  [97.5 °F (36.4 °C)] 97.5 °F (36.4 °C)      No intake or output data in the 24 hours ending 10/18/24 1405      Physical Exam:     General Appearance:    Alert, cooperative, in no acute distress   Head:    Normocephalic, without obvious abnormality, atraumatic    Heart:    Regular rhythm and normal rate     Chest Wall:    No abnormalities observed   Abdomen:    Soft non-tender, non-distended, no guarding, no rebound                tenderness   Extremities:   Moves all extremities well, no cyanosis. Ulcer distal right 3rd toe 0.7cm x 0.5cm, +PTB, local erythema and edema to toe, scant drainage, no warmth.   Pulses:   Pulses palpable and equal bilaterally   Skin:   No bleeding, bruising or rash   Lymph nodes:   No palpable adenopathy                   Lab Results   Component Value Date    NEUTROABS 2.72 07/13/2020                   Imaging Results (Last 24 Hours)       ** No results found for the last 24 hours. **              Results Review:    I have personally reviewed laboratory data, culture results, radiology studies and antimicrobial therapy.    Hospital Medications " (active)         Dose Frequency Start End    ceFAZolin 2000 mg IVPB in 100 mL NS (VTB) 2,000 mg Once 10/18/2024 --    Admin Instructions: Administer Within 1 Hour of Surgical Incision.  Redose 4 Hours From Pre-Op Dose if Procedure Ongoing or Blood Loss Greater  Than 1.5 L    Caution: Look alike/sound alike drug alert    Route: Intravenous    lactated ringers infusion 75 mL/hr Continuous 10/18/2024 10/18/2024    Route: Intravenous              PROBLEM LIST:    Patient Active Problem List   Diagnosis    Cellulitis of foot, right    Hyperglycemia    Abscess of right foot    T2DM (type 2 diabetes mellitus)    GERD (gastroesophageal reflux disease)    Hypoalbuminemia    Chronic osteomyelitis of right foot with draining sinus    Osteomyelitis of third toe of right foot       [unfilled]    ASSESSMENT:    Right 3rd toe osteomyelitis with diabetic neuropathic ulceration    PLAN:    Proceeding with amputation of the toe after thorough discussion of all alternatives, risks and benefits.      Patients findings and recommendations were discussed with patient    Code Status:   There are no questions and answers to display.       Daisy Gerard DPM  10/18/24  14:05 EDT

## 2024-10-18 NOTE — DISCHARGE INSTRUCTIONS
No pushing, pulling, tugging,  heavy lifting, or strenuous activity.  No major decision making, driving, or drinking alcoholic beverages for 24 hours. ( due to the medications you have  received)  Always use good hand hygiene/washing techniques.  NO driving while taking pain medications.    * if you have an incision:  Check your incision area every day for signs of infection.   Check for:  * more redness, swelling, or pain  *more fluid or blood  *warmth  *pus or bad smell.    To assist you in voiding:  Drink plenty of fluids  Listen to running water while attempting to void.    If you are unable to urinate and you have an uncomfortable urge to void or it has been   6 hours since you were discharged, return to the Emergency Room.    Keep your right foot elevated on pillow to protect extremity and help decrease edema and improve comfort.      Wear a surgical shoe with weight bearing as tolerated in shoe. Leave dressing clean/dry and intact.

## 2024-10-18 NOTE — OP NOTE
Davian Newell  10/18/2024      Operative Progress Note:    Surgeon and Assistant: Dr. Daisy Gerard DPM    Pre-Operative Diagnosis: Osteomyelitis right 3rd distal phalanx    Post-Operative Diagnosis: Osteomyelitis right 3rd distal phalanx    Procedure(s):  Amputation of the right 3rd toe through the proximal phalanx    Type of Anesthesia Administered: General with local block consisting of 10cc 50:50 mix 1% lidocaine and 0.5% marcaine    Estimated Blood Loss: 2mL    Hemostasis: ankle tourniquet inflated to 250mmHg    Blood Products: None    Specimen Obtained/Removed: Distal right 3rd toe sent to pathology. Culture swab of surgical site.    Complication(s):  None    Graft/Implant/Prosthetics/Implanted Device/Transplants: None    Indication: 64 year old male with PMH significant for diabetes and previous right foot partial 5th ray amputation developed an ulceration to the distal aspect of his right 3rd toe and subsequently underlying osteomyelitis. He elects to proceed with amputation of the toe following a thorough discussion of all alternatives, risks, and benefits.    Findings: 0.7cm x 0.5cm ulceration distal right 3rd toe with +PTB, scant drainage, local erythema and edema    Operative Report:  Patient was identified in the preoperative holding area, formal consent was signed, the right third toe was marked as correct site.  Patient was brought to the operating suite placed in the operating table in the supine position.  Following timeout he then underwent anesthesia.  A well-padded right ankle tourniquet was applied.  The foot was then scrubbed prepped and draped in the usual sterile manner.  A second preprocedure timeout was performed.  Esmarch exsanguination was utilized and the tourniquet was inflated.  Attention was then focused to the right third toe where there was noted to be an ulceration to the distal tip.  A 15 blade was utilized to make a full-thickness longitudinal fishmouth incision at the level of  the proximal phalanx head.  Dissection was carried out to expose the proximal phalanx and a bone cutter was utilized to cut through the shaft.  The distal aspect of the toe was removed from the surgical field to be sent to pathology.  A culture swab was then taken of the surgical site.  There was no noted deep necrosis nor purulence.  And to the proximal phalanx appeared grossly viable.  The surgical site was copiously irrigated with sterile saline, vancomycin powder was applied within the surgical site, the skin was reapproximated using 2-0 nylon.  An additional 10 cc 0.5% Marcaine plain was administered locally.  The foot was then dressed and dry sterile dressing.  The tourniquet was deflated there was noted to be brisk capillary refill to all remaining digits.  The patient was transferred to PACU with vital signs stable.  He will be transferred home today with instructions to leave the dressing clean dry and intact, weightbearing as tolerated in a surgical shoe to the right foot, follow-up 1 week.       Electronically Signed by: Daisy Gerard DPM        Dictated Utilizing Dragon Dictation

## 2024-10-18 NOTE — ANESTHESIA POSTPROCEDURE EVALUATION
Patient: Davian Newell    Procedure Summary       Date: 10/18/24 Room / Location: [facility] OR 2 /  [facility]    Anesthesia Start: 1419 Anesthesia Stop: 1506    Procedure: AMPUTATION DIGIT R 3rd (Right: Foot) Diagnosis:       Osteomyelitis of third toe of right foot      (Osteomyelitis of third toe of right foot [M86.9])    Surgeons: Daisy Gerard DPM Provider: Ian Caldwell CRNA    Anesthesia Type: MAC ASA Status: 2            Anesthesia Type: MAC    Vitals  No vitals data found for the desired time range.          Post Anesthesia Care and Evaluation    Patient location during evaluation: PHASE II  Patient participation: complete - patient participated  Level of consciousness: awake and alert  Pain score: 1  Pain management: adequate    Airway patency: patent  Anesthetic complications: No anesthetic complications  PONV Status: none  Cardiovascular status: acceptable  Respiratory status: acceptable  Hydration status: acceptable    Comments: See RN notes for vital signs  No anesthesia care post op

## 2024-10-18 NOTE — ANESTHESIA PREPROCEDURE EVALUATION
Anesthesia Evaluation     Patient summary reviewed and Nursing notes reviewed   NPO Solid Status: > 8 hours  NPO Liquid Status: > 8 hours           Airway   Mallampati: II  TM distance: >3 FB  Neck ROM: full  Possible difficult intubation  Dental - normal exam     Pulmonary - negative pulmonary ROS and normal exam    breath sounds clear to auscultation  Cardiovascular - negative cardio ROS and normal exam    ECG reviewed  Rhythm: regular  Rate: normal      ROS comment: 2020 EKG NSR    Neuro/Psych- negative ROS  GI/Hepatic/Renal/Endo    (+) GERD, diabetes mellitus type 2    Musculoskeletal     Abdominal  - normal exam   Substance History   (+) alcohol use     OB/GYN          Other   arthritis,     ROS/Med Hx Other: osteomylitis              Anesthesia Plan    ASA 2     MAC   total IV anesthesia  (Risks and benefits discussed including risk of aspiration, recall and dental damage. All patient questions answered.    Will continue with plan of care.)  intravenous induction     Anesthetic plan, risks, benefits, and alternatives have been provided, discussed and informed consent has been obtained with: patient.    CODE STATUS:

## 2024-10-21 LAB
BACTERIA SPEC AEROBE CULT: ABNORMAL
GRAM STN SPEC: ABNORMAL
GRAM STN SPEC: ABNORMAL

## 2024-10-22 LAB — REF LAB TEST METHOD: NORMAL

## 2024-10-23 LAB — BACTERIA SPEC ANAEROBE CULT: NORMAL

## 2025-05-28 ENCOUNTER — APPOINTMENT (OUTPATIENT)
Dept: CT IMAGING | Facility: HOSPITAL | Age: 65
End: 2025-05-28
Payer: COMMERCIAL

## 2025-05-28 ENCOUNTER — HOSPITAL ENCOUNTER (EMERGENCY)
Facility: HOSPITAL | Age: 65
Discharge: HOME | End: 2025-05-28
Attending: STUDENT IN AN ORGANIZED HEALTH CARE EDUCATION/TRAINING PROGRAM
Payer: COMMERCIAL

## 2025-05-28 VITALS
HEIGHT: 70 IN | WEIGHT: 189 LBS | DIASTOLIC BLOOD PRESSURE: 82 MMHG | SYSTOLIC BLOOD PRESSURE: 168 MMHG | HEART RATE: 59 BPM | RESPIRATION RATE: 18 BRPM | OXYGEN SATURATION: 96 % | BODY MASS INDEX: 27.06 KG/M2 | TEMPERATURE: 98.7 F

## 2025-05-28 DIAGNOSIS — M54.50 ACUTE RIGHT-SIDED LOW BACK PAIN WITHOUT SCIATICA: Primary | ICD-10-CM

## 2025-05-28 DIAGNOSIS — N20.0 RIGHT KIDNEY STONE: ICD-10-CM

## 2025-05-28 LAB
ALBUMIN SERPL-MCNC: 4.3 G/DL (ref 3.5–5.2)
ALBUMIN/GLOB SERPL: 1.4 G/DL
ALP SERPL-CCNC: 114 U/L (ref 39–117)
ALT SERPL W P-5'-P-CCNC: 20 U/L (ref 1–41)
ANION GAP SERPL CALCULATED.3IONS-SCNC: 11.1 MMOL/L (ref 5–15)
AST SERPL-CCNC: 23 U/L (ref 1–40)
BASOPHILS # BLD AUTO: 0.04 10*3/MM3 (ref 0–0.2)
BASOPHILS NFR BLD AUTO: 0.7 % (ref 0–1.5)
BILIRUB SERPL-MCNC: 0.3 MG/DL (ref 0–1.2)
BILIRUB UR QL STRIP: NEGATIVE
BUN SERPL-MCNC: 17.3 MG/DL (ref 8–23)
BUN/CREAT SERPL: 19.4 (ref 7–25)
CALCIUM SPEC-SCNC: 9.3 MG/DL (ref 8.6–10.5)
CHLORIDE SERPL-SCNC: 103 MMOL/L (ref 98–107)
CLARITY UR: CLEAR
CO2 SERPL-SCNC: 23.9 MMOL/L (ref 22–29)
COLOR UR: YELLOW
CREAT SERPL-MCNC: 0.89 MG/DL (ref 0.76–1.27)
DEPRECATED RDW RBC AUTO: 44.3 FL (ref 37–54)
EGFRCR SERPLBLD CKD-EPI 2021: 95.1 ML/MIN/1.73
EOSINOPHIL # BLD AUTO: 0.13 10*3/MM3 (ref 0–0.4)
EOSINOPHIL NFR BLD AUTO: 2.2 % (ref 0.3–6.2)
ERYTHROCYTE [DISTWIDTH] IN BLOOD BY AUTOMATED COUNT: 13.2 % (ref 12.3–15.4)
GLOBULIN UR ELPH-MCNC: 3.1 GM/DL
GLUCOSE SERPL-MCNC: 173 MG/DL (ref 65–99)
GLUCOSE UR STRIP-MCNC: NEGATIVE MG/DL
HCT VFR BLD AUTO: 42.6 % (ref 37.5–51)
HGB BLD-MCNC: 14 G/DL (ref 13–17.7)
HGB UR QL STRIP.AUTO: NEGATIVE
HOLD SPECIMEN: NORMAL
HOLD SPECIMEN: NORMAL
IMM GRANULOCYTES # BLD AUTO: 0.03 10*3/MM3 (ref 0–0.05)
IMM GRANULOCYTES NFR BLD AUTO: 0.5 % (ref 0–0.5)
KETONES UR QL STRIP: NEGATIVE
LEUKOCYTE ESTERASE UR QL STRIP.AUTO: NEGATIVE
LYMPHOCYTES # BLD AUTO: 1.92 10*3/MM3 (ref 0.7–3.1)
LYMPHOCYTES NFR BLD AUTO: 32.6 % (ref 19.6–45.3)
MCH RBC QN AUTO: 30 PG (ref 26.6–33)
MCHC RBC AUTO-ENTMCNC: 32.9 G/DL (ref 31.5–35.7)
MCV RBC AUTO: 91.4 FL (ref 79–97)
MONOCYTES # BLD AUTO: 0.65 10*3/MM3 (ref 0.1–0.9)
MONOCYTES NFR BLD AUTO: 11 % (ref 5–12)
NEUTROPHILS NFR BLD AUTO: 3.12 10*3/MM3 (ref 1.7–7)
NEUTROPHILS NFR BLD AUTO: 53 % (ref 42.7–76)
NITRITE UR QL STRIP: NEGATIVE
NRBC BLD AUTO-RTO: 0 /100 WBC (ref 0–0.2)
PH UR STRIP.AUTO: <=5 [PH] (ref 5–8)
PLATELET # BLD AUTO: 302 10*3/MM3 (ref 140–450)
PMV BLD AUTO: 9.5 FL (ref 6–12)
POTASSIUM SERPL-SCNC: 4.4 MMOL/L (ref 3.5–5.2)
PROT SERPL-MCNC: 7.4 G/DL (ref 6–8.5)
PROT UR QL STRIP: NEGATIVE
RBC # BLD AUTO: 4.66 10*6/MM3 (ref 4.14–5.8)
SODIUM SERPL-SCNC: 138 MMOL/L (ref 136–145)
SP GR UR STRIP: 1.02 (ref 1–1.03)
UROBILINOGEN UR QL STRIP: NORMAL
WBC NRBC COR # BLD AUTO: 5.89 10*3/MM3 (ref 3.4–10.8)
WHOLE BLOOD HOLD COAG: NORMAL
WHOLE BLOOD HOLD SPECIMEN: NORMAL

## 2025-05-28 PROCEDURE — 80053 COMPREHEN METABOLIC PANEL: CPT | Performed by: STUDENT IN AN ORGANIZED HEALTH CARE EDUCATION/TRAINING PROGRAM

## 2025-05-28 PROCEDURE — 81003 URINALYSIS AUTO W/O SCOPE: CPT | Performed by: STUDENT IN AN ORGANIZED HEALTH CARE EDUCATION/TRAINING PROGRAM

## 2025-05-28 PROCEDURE — 85025 COMPLETE CBC W/AUTO DIFF WBC: CPT | Performed by: STUDENT IN AN ORGANIZED HEALTH CARE EDUCATION/TRAINING PROGRAM

## 2025-05-28 PROCEDURE — 36415 COLL VENOUS BLD VENIPUNCTURE: CPT

## 2025-05-28 PROCEDURE — 99284 EMERGENCY DEPT VISIT MOD MDM: CPT

## 2025-05-28 PROCEDURE — 96374 THER/PROPH/DIAG INJ IV PUSH: CPT

## 2025-05-28 PROCEDURE — 25010000002 ONDANSETRON PER 1 MG: Performed by: STUDENT IN AN ORGANIZED HEALTH CARE EDUCATION/TRAINING PROGRAM

## 2025-05-28 PROCEDURE — 74176 CT ABD & PELVIS W/O CONTRAST: CPT | Performed by: RADIOLOGY

## 2025-05-28 PROCEDURE — 74176 CT ABD & PELVIS W/O CONTRAST: CPT

## 2025-05-28 PROCEDURE — 96375 TX/PRO/DX INJ NEW DRUG ADDON: CPT

## 2025-05-28 PROCEDURE — 25010000002 HYDROMORPHONE 1 MG/ML SOLUTION: Performed by: STUDENT IN AN ORGANIZED HEALTH CARE EDUCATION/TRAINING PROGRAM

## 2025-05-28 RX ORDER — SODIUM CHLORIDE 0.9 % (FLUSH) 0.9 %
10 SYRINGE (ML) INJECTION AS NEEDED
Status: DISCONTINUED | OUTPATIENT
Start: 2025-05-28 | End: 2025-05-28 | Stop reason: HOSPADM

## 2025-05-28 RX ORDER — HYDROCODONE BITARTRATE AND ACETAMINOPHEN 5; 325 MG/1; MG/1
1 TABLET ORAL EVERY 6 HOURS PRN
Qty: 16 TABLET | Refills: 0 | Status: SHIPPED | OUTPATIENT
Start: 2025-05-28

## 2025-05-28 RX ORDER — ONDANSETRON 2 MG/ML
4 INJECTION INTRAMUSCULAR; INTRAVENOUS ONCE
Status: COMPLETED | OUTPATIENT
Start: 2025-05-28 | End: 2025-05-28

## 2025-05-28 RX ORDER — TAMSULOSIN HYDROCHLORIDE 0.4 MG/1
1 CAPSULE ORAL DAILY
Qty: 30 CAPSULE | Refills: 0 | Status: SHIPPED | OUTPATIENT
Start: 2025-05-28

## 2025-05-28 RX ADMIN — HYDROMORPHONE HYDROCHLORIDE 1 MG: 1 INJECTION, SOLUTION INTRAMUSCULAR; INTRAVENOUS; SUBCUTANEOUS at 06:38

## 2025-05-28 RX ADMIN — ONDANSETRON HYDROCHLORIDE 4 MG: 2 SOLUTION INTRAMUSCULAR; INTRAVENOUS at 06:39

## 2025-05-28 NOTE — ED NOTES
Report received from POLO Roman. Pt resting on stretcher A&OX4, RR even and unlabored, skin WPD. Pt VSS. Pt updated about POC, re-encouraged to provide urine specimen, stated that he went earlier and cannot at this time. Pt updated about POC with verbalized understanding. WCTM. Safety measures remain WDL.

## 2025-05-28 NOTE — ED PROVIDER NOTES
Subjective   History of Present Illness  65-year-old male presents with complaints of acute onset of flank pain.  Patient does have a history of diabetes mellitus.      Review of Systems    Past Medical History:   Diagnosis Date    Arthritis     Diabetes mellitus     GERD (gastroesophageal reflux disease) 06/17/2020    occasional    Osteomyelitis of foot     chronic, right foot - followed by Dr. Gerard       No Known Allergies    Past Surgical History:   Procedure Laterality Date    AMPUTATION DIGIT Right 10/05/2021    Procedure: AMPUTATION DIGIT RIGHT 5TH METATARSAL HEAD;  Surgeon: Daisy Gerard DPM;  Location: [facility] OR;  Service: Podiatry;  Laterality: Right;    AMPUTATION DIGIT Right 10/18/2024    Procedure: AMPUTATION DIGIT R 3rd;  Surgeon: Daisy Gerard DPM;  Location:  [facility] OR;  Service: Podiatry;  Laterality: Right;    COLONOSCOPY      LEG EXCISION LESION/CYST Right 06/16/2020    Procedure: INCISION AND DRAINAGE LOWER EXTREMITY;  Surgeon: Daisy Gerard DPM;  Location: [facility] OR;  Service: Podiatry;  Laterality: Right;    ORIF ANKLE FRACTURE Right     compound fx hardware present       No family history on file.    Social History     Socioeconomic History    Marital status: Single   Tobacco Use    Smoking status: Never    Smokeless tobacco: Never   Vaping Use    Vaping status: Never Used   Substance and Sexual Activity    Alcohol use: Yes     Comment: rarely    Drug use: No    Sexual activity: Defer           Objective   Physical Exam    Procedures           ED Course  ED Course as of 05/28/25 1016   Wed May 28, 2025   0953 BP(!): 211/89 [RA]   0953 Temp: 98.7 °F (37.1 °C) [RA]   0953 Heart Rate: 89 [RA]   0953 Resp: 18 [RA]   0953 SpO2: 97 %  Room air [RA]   0953 Glucose(!): 173 [RA]   0953 CT Abdomen Pelvis Stone Protocol  NONOBSTRUCTING CALCULI IN THE CALYCES OF THE RIGHT KIDNEY.  NO URETERAL  CALCULUS.     SIGMOID DIVERTICULOSIS WITHOUT FINDINGS FOR ACUTE DIVERTICULITIS.     MODERATE  CONSTIPATION. [RA]      ED Course User Index  [RA] Neo Bowers MD KASPER reviewed by Monika Marrufo DO       Medical Decision Making  Problems Addressed:  Acute right-sided low back pain without sciatica: complicated acute illness or injury  Right kidney stone: complicated acute illness or injury    Amount and/or Complexity of Data Reviewed  Labs: ordered. Decision-making details documented in ED Course.  Radiology: ordered. Decision-making details documented in ED Course.    Risk  Prescription drug management.        Final diagnoses:   Acute right-sided low back pain without sciatica   Right kidney stone       ED Disposition  ED Disposition       ED Disposition   Discharge    Condition   Stable    Comment   --               No follow-up provider specified.       Medication List        New Prescriptions      naloxone 4 MG/0.1ML nasal spray  Commonly known as: NARCAN  Call 911. Don't prime. [place] in 1 nostril for overdose. Repeat in 2-3 minutes in other nostril if no or minimal breathing/responsiveness.     tamsulosin 0.4 MG capsule 24 hr capsule  Commonly known as: FLOMAX  Take 1 capsule by mouth Daily.            Changed      * HYDROcodone-acetaminophen 5-325 MG per tablet  Commonly known as: NORCO  Take one tablet every 4 hours prn pain  What changed: Another medication with the same name was added. Make sure you understand how and when to take each.     * HYDROcodone-acetaminophen 5-325 MG per tablet  Commonly known as: NORCO  Take 1 tablet by mouth Every 6 (Six) Hours As Needed for Moderate Pain or Severe Pain for up to 16 doses.  What changed: You were already taking a medication with the same name, and this prescription was added. Make sure you understand how and when to take each.           * This list has 2 medication(s) that are the same as other medications prescribed for you. Read the directions carefully, and ask your doctor or other care provider  to review them with you.                   Where to Get Your Medications        These medications were sent to [facility][id number] - [facility] [address] - [phone] PH - [phone]   [address] [address]      Phone: [phone]   HYDROcodone-acetaminophen 5-325 MG per tablet  naloxone 4 MG/0.1ML nasal spray  tamsulosin 0.4 MG capsule 24 hr capsule            Neo Bowers MD  05/28/25 1016

## (undated) DEVICE — OSCILLATING SAW BLADE, 9MM X 24.6MM X 0.64MM: Brand: MICROAIRE®

## (undated) DEVICE — SPNG GZ WOVN 4X4IN 12PLY 10/BX STRL

## (undated) DEVICE — TRY SKINPREP PVP SCRB W PAINT

## (undated) DEVICE — GLV SURG PREMIERPRO MIC LTX PF SZ7 BRN

## (undated) DEVICE — BNDG ELAS ELITE V/CLOSE 4IN 5YD LF STRL

## (undated) DEVICE — STOCKINETTE,IMPERVIOUS,12X48,STERILE: Brand: MEDLINE

## (undated) DEVICE — DISPOSABLE TOURNIQUET CUFF SINGLE BLADDER, SINGLE PORT AND QUICK CONNECT CONNECTOR: Brand: COLOR CUFF

## (undated) DEVICE — BANDAGE,GAUZE,BULKEE II,4.5"X4.1YD,STRL: Brand: MEDLINE

## (undated) DEVICE — HOLDER: Brand: DEROYAL

## (undated) DEVICE — GLV SURG SENSICARE W/ALOE PF LF 6.5 STRL

## (undated) DEVICE — GLV SURG SENSICARE PI PF LF 7 GRN STRL

## (undated) DEVICE — Device

## (undated) DEVICE — BNDG ELAS CO-FLEX SLF ADHR 4IN5YD LF STRL

## (undated) DEVICE — NDL HYPO ECLPS SFTY 22G 1 1/2IN

## (undated) DEVICE — BANDAGE,ELASTIC,ESMARK,STERILE,4"X9',LF: Brand: MEDLINE

## (undated) DEVICE — PK EXTREM LOWR 70

## (undated) DEVICE — SUT MNCRYL 3/0 SH 27IN UD MCP416H

## (undated) DEVICE — SUT NLY 2/0 664G

## (undated) DEVICE — SKIN PREP TRAY 4 COMPARTM TRAY: Brand: MEDLINE INDUSTRIES, INC.

## (undated) DEVICE — UNDERCAST PADDING: Brand: DEROYAL

## (undated) DEVICE — DRSNG WND GZ CURAD OIL EMULSION 3X8IN LF STRL 1PK

## (undated) DEVICE — DRSNG WND GZ CURAD OIL EMULSION 3X3IN STRL

## (undated) DEVICE — PK EXTRM LOWR 20

## (undated) DEVICE — PATIENT RETURN ELECTRODE, SINGLE-USE, CONTACT QUALITY MONITORING, ADULT, WITH 9FT CORD, FOR PATIENTS WEIGING OVER 33LBS. (15KG): Brand: MEGADYNE

## (undated) DEVICE — SYR LL TP 10ML STRL

## (undated) DEVICE — SPNG GZ STRL 2S 4X4 12PLY

## (undated) DEVICE — SUT ETHLN 3/0 FS1 663G